# Patient Record
Sex: MALE | Race: WHITE | NOT HISPANIC OR LATINO | Employment: OTHER | URBAN - METROPOLITAN AREA
[De-identification: names, ages, dates, MRNs, and addresses within clinical notes are randomized per-mention and may not be internally consistent; named-entity substitution may affect disease eponyms.]

---

## 2018-03-28 ENCOUNTER — HOSPITAL ENCOUNTER (OUTPATIENT)
Dept: LAB | Facility: HOSPITAL | Age: 68
Setting detail: SPECIMEN
Discharge: HOME | End: 2018-03-28

## 2018-03-28 LAB
ALBUMIN SERPL-MCNC: 3.9 G/DL (ref 3.5–4.8)
ALP SERPL-CCNC: 51 IU/L (ref 32–91)
ALT SERPL-CCNC: 18 IU/L (ref 17–63)
AST SERPL-CCNC: 18 IU/L (ref 15–41)
BILIRUB DIRECT SERPL-MCNC: 0.2 MG/DL (ref 0.1–0.5)
BILIRUB SERPL-MCNC: 1.2 MG/DL (ref 0.3–1.2)
CONV TOTAL PROTEIN: 6.6 G/DL (ref 6.1–7.9)

## 2018-03-29 LAB
HAV IGM SERPL QL IA: ABNORMAL

## 2018-04-09 ENCOUNTER — HOSPITAL ENCOUNTER (OUTPATIENT)
Dept: LAB | Facility: HOSPITAL | Age: 68
Setting detail: SPECIMEN
Discharge: HOME | End: 2018-04-09

## 2018-04-09 LAB
ALBUMIN SERPL-MCNC: 4.1 G/DL (ref 3.5–4.8)
ALP SERPL-CCNC: 54 IU/L (ref 32–91)
ALT SERPL-CCNC: 20 IU/L (ref 17–63)
AST SERPL-CCNC: 21 IU/L (ref 15–41)
BILIRUB DIRECT SERPL-MCNC: 0.2 MG/DL (ref 0.1–0.5)
BILIRUB SERPL-MCNC: 1.2 MG/DL (ref 0.3–1.2)
CONV TOTAL PROTEIN: 7 G/DL (ref 6.1–7.9)

## 2018-04-10 LAB
HAV IGM SERPL QL IA: ABNORMAL

## 2018-04-26 ENCOUNTER — CONVERSION ENCOUNTER (OUTPATIENT)
Dept: FAMILY MEDICINE CLINIC | Facility: CLINIC | Age: 68
End: 2018-04-26

## 2018-04-27 LAB — HAV IGM SERPL QL IA: NORMAL

## 2018-05-31 ENCOUNTER — HOSPITAL ENCOUNTER (OUTPATIENT)
Dept: SLEEP MEDICINE | Facility: HOSPITAL | Age: 68
Discharge: HOME | End: 2018-05-31
Attending: INTERNAL MEDICINE | Admitting: INTERNAL MEDICINE

## 2018-06-11 ENCOUNTER — HOSPITAL ENCOUNTER (OUTPATIENT)
Dept: SLEEP MEDICINE | Facility: HOSPITAL | Age: 68
Discharge: HOME | End: 2018-06-11
Attending: INTERNAL MEDICINE | Admitting: INTERNAL MEDICINE

## 2018-07-05 ENCOUNTER — HOSPITAL ENCOUNTER (OUTPATIENT)
Dept: SLEEP MEDICINE | Facility: HOSPITAL | Age: 68
Discharge: HOME | End: 2018-07-05
Attending: INTERNAL MEDICINE | Admitting: INTERNAL MEDICINE

## 2018-12-17 ENCOUNTER — OFFICE (OUTPATIENT)
Dept: URBAN - METROPOLITAN AREA CLINIC 64 | Facility: CLINIC | Age: 68
End: 2018-12-17

## 2018-12-17 VITALS
HEART RATE: 68 BPM | HEIGHT: 64 IN | DIASTOLIC BLOOD PRESSURE: 82 MMHG | SYSTOLIC BLOOD PRESSURE: 139 MMHG | WEIGHT: 162 LBS

## 2018-12-17 DIAGNOSIS — R13.10 DYSPHAGIA, UNSPECIFIED: ICD-10-CM

## 2018-12-17 DIAGNOSIS — Z12.11 ENCOUNTER FOR SCREENING FOR MALIGNANT NEOPLASM OF COLON: ICD-10-CM

## 2018-12-17 DIAGNOSIS — K21.9 GASTRO-ESOPHAGEAL REFLUX DISEASE WITHOUT ESOPHAGITIS: ICD-10-CM

## 2018-12-17 PROCEDURE — 99203 OFFICE O/P NEW LOW 30 MIN: CPT | Performed by: INTERNAL MEDICINE

## 2018-12-18 ENCOUNTER — HOSPITAL ENCOUNTER (OUTPATIENT)
Dept: CARDIOLOGY | Facility: HOSPITAL | Age: 68
Discharge: HOME | End: 2018-12-18

## 2019-02-12 ENCOUNTER — HOSPITAL ENCOUNTER (OUTPATIENT)
Dept: GASTROENTEROLOGY | Facility: HOSPITAL | Age: 69
Setting detail: HOSPITAL OUTPATIENT SURGERY
Discharge: HOME | End: 2019-02-12
Attending: INTERNAL MEDICINE | Admitting: INTERNAL MEDICINE

## 2019-02-12 ENCOUNTER — ON CAMPUS - OUTPATIENT (OUTPATIENT)
Dept: URBAN - METROPOLITAN AREA HOSPITAL 85 | Facility: HOSPITAL | Age: 69
End: 2019-02-12
Payer: COMMERCIAL

## 2019-02-12 DIAGNOSIS — R13.10 DYSPHAGIA, UNSPECIFIED: ICD-10-CM

## 2019-02-12 DIAGNOSIS — K44.9 DIAPHRAGMATIC HERNIA WITHOUT OBSTRUCTION OR GANGRENE: ICD-10-CM

## 2019-02-12 LAB
GLUCOSE BLD-MCNC: 205 MG/DL (ref 70–105)
GLUCOSE BLD-MCNC: 210 MG/DL (ref 70–105)

## 2019-02-12 PROCEDURE — 43235 EGD DIAGNOSTIC BRUSH WASH: CPT | Performed by: INTERNAL MEDICINE

## 2019-02-12 PROCEDURE — 43450 DILATE ESOPHAGUS 1/MULT PASS: CPT | Performed by: INTERNAL MEDICINE

## 2019-02-27 ENCOUNTER — OFFICE (OUTPATIENT)
Dept: URBAN - METROPOLITAN AREA CLINIC 64 | Facility: CLINIC | Age: 69
End: 2019-02-27

## 2019-02-27 VITALS
WEIGHT: 167 LBS | HEIGHT: 64 IN | DIASTOLIC BLOOD PRESSURE: 80 MMHG | SYSTOLIC BLOOD PRESSURE: 144 MMHG | HEART RATE: 64 BPM

## 2019-02-27 DIAGNOSIS — R13.10 DYSPHAGIA, UNSPECIFIED: ICD-10-CM

## 2019-02-27 PROCEDURE — 99212 OFFICE O/P EST SF 10 MIN: CPT | Performed by: NURSE PRACTITIONER

## 2019-06-21 ENCOUNTER — OFFICE VISIT (OUTPATIENT)
Dept: FAMILY MEDICINE CLINIC | Facility: CLINIC | Age: 69
End: 2019-06-21

## 2019-06-21 ENCOUNTER — TELEPHONE (OUTPATIENT)
Dept: FAMILY MEDICINE CLINIC | Facility: CLINIC | Age: 69
End: 2019-06-21

## 2019-06-21 ENCOUNTER — HOSPITAL ENCOUNTER (OUTPATIENT)
Dept: GENERAL RADIOLOGY | Facility: HOSPITAL | Age: 69
Discharge: HOME | End: 2019-06-21

## 2019-06-21 ENCOUNTER — HOSPITAL ENCOUNTER (OUTPATIENT)
Dept: GENERAL RADIOLOGY | Facility: HOSPITAL | Age: 69
Discharge: HOME | End: 2019-06-21
Admitting: FAMILY MEDICINE

## 2019-06-21 VITALS
HEART RATE: 80 BPM | TEMPERATURE: 98.3 F | RESPIRATION RATE: 18 BRPM | WEIGHT: 165.8 LBS | OXYGEN SATURATION: 98 % | HEIGHT: 64 IN | DIASTOLIC BLOOD PRESSURE: 70 MMHG | SYSTOLIC BLOOD PRESSURE: 116 MMHG | BODY MASS INDEX: 28.31 KG/M2

## 2019-06-21 DIAGNOSIS — M54.50 LOW BACK PAIN POTENTIALLY ASSOCIATED WITH RADICULOPATHY: Primary | ICD-10-CM

## 2019-06-21 DIAGNOSIS — M25.552 LEFT HIP PAIN: ICD-10-CM

## 2019-06-21 DIAGNOSIS — M54.50 LOW BACK PAIN POTENTIALLY ASSOCIATED WITH RADICULOPATHY: ICD-10-CM

## 2019-06-21 PROBLEM — E55.9 VITAMIN D DEFICIENCY: Status: ACTIVE | Noted: 2019-06-21

## 2019-06-21 PROBLEM — R35.1 NOCTURIA: Status: ACTIVE | Noted: 2019-06-21

## 2019-06-21 PROBLEM — G47.33 OSA (OBSTRUCTIVE SLEEP APNEA): Status: ACTIVE | Noted: 2019-06-21

## 2019-06-21 PROBLEM — N52.9 ERECTILE DYSFUNCTION: Status: ACTIVE | Noted: 2019-06-21

## 2019-06-21 PROBLEM — K21.9 GERD (GASTROESOPHAGEAL REFLUX DISEASE): Status: ACTIVE | Noted: 2019-06-21

## 2019-06-21 PROBLEM — R53.83 FATIGUE: Status: ACTIVE | Noted: 2019-06-21

## 2019-06-21 PROBLEM — E78.5 DYSLIPIDEMIA: Status: ACTIVE | Noted: 2019-06-21

## 2019-06-21 PROCEDURE — 99214 OFFICE O/P EST MOD 30 MIN: CPT | Performed by: FAMILY MEDICINE

## 2019-06-21 PROCEDURE — 73502 X-RAY EXAM HIP UNI 2-3 VIEWS: CPT

## 2019-06-21 PROCEDURE — 72110 X-RAY EXAM L-2 SPINE 4/>VWS: CPT

## 2019-06-21 RX ORDER — OMEGA-3S/DHA/EPA/FISH OIL/D3 300MG-1000
400 CAPSULE ORAL DAILY
COMMUNITY

## 2019-06-21 RX ORDER — OMEPRAZOLE 20 MG/1
1 CAPSULE, DELAYED RELEASE ORAL DAILY
COMMUNITY

## 2019-06-21 RX ORDER — LORATADINE 10 MG/1
1 TABLET ORAL DAILY
COMMUNITY
End: 2019-10-02

## 2019-06-21 RX ORDER — MELOXICAM 15 MG/1
15 TABLET ORAL DAILY
Qty: 30 TABLET | Refills: 5 | Status: SHIPPED | OUTPATIENT
Start: 2019-06-21 | End: 2019-10-02

## 2019-06-21 RX ORDER — ELECTROLYTES/DEXTROSE
1 SOLUTION, ORAL ORAL DAILY
COMMUNITY
End: 2019-10-02

## 2019-06-21 RX ORDER — LISINOPRIL 20 MG/1
20 TABLET ORAL DAILY
COMMUNITY

## 2019-06-21 NOTE — TELEPHONE ENCOUNTER
----- Message from Cesar Mcleod MD sent at 6/21/2019  3:58 PM EDT -----  Please notify patient that  Xray showed significant arthritis in his back.

## 2019-06-21 NOTE — TELEPHONE ENCOUNTER
----- Message from Cesar Mcleod MD sent at 6/21/2019  4:00 PM EDT -----  Please notify patient that  Hip xray was unremarkable. Consider MRI back if not improved with mobic.

## 2019-06-21 NOTE — PROGRESS NOTES
Subjective   Richard Garcia is a 69 y.o. male.     Left hip locks up, unable to walk long distances x 6 months.  Also ongoing back pain. Recent DANIEL normal. Lower extremity numbness bilat legs. Pt is also type 1 DM      Hip Pain    There was no injury mechanism. The pain is present in the left hip. The quality of the pain is described as aching. The pain has been constant since onset. Associated symptoms include a loss of motion and numbness (in both legs). The symptoms are aggravated by movement.        The following portions of the patient's history were reviewed and updated as appropriate: allergies, current medications, past family history, past medical history, past social history, past surgical history and problem list.    Review of Systems   Constitutional: Negative for activity change, appetite change, fatigue and fever.   HENT: Negative for ear pain and voice change.    Eyes: Negative for visual disturbance.   Respiratory: Negative for shortness of breath and wheezing.    Cardiovascular: Negative for chest pain and leg swelling.   Gastrointestinal: Negative for abdominal pain, blood in stool, constipation, diarrhea, nausea and vomiting.   Endocrine: Negative for polydipsia and polyuria.   Genitourinary: Negative for dysuria, frequency and hematuria.   Musculoskeletal: Positive for back pain and myalgias. Negative for joint swelling, neck pain and neck stiffness.   Skin: Negative for rash and bruise.   Neurological: Positive for numbness (in both legs). Negative for weakness and headache.   Psychiatric/Behavioral: Negative for suicidal ideas and depressed mood.       Objective   Physical Exam   Constitutional: He is oriented to person, place, and time. He appears well-developed and well-nourished.   Eyes: Conjunctivae and EOM are normal. Pupils are equal, round, and reactive to light.   Neck: Normal range of motion. Neck supple.   Cardiovascular: Normal rate, regular rhythm and normal heart sounds.    Pulmonary/Chest: Effort normal and breath sounds normal.   Abdominal: Soft. Bowel sounds are normal.   Musculoskeletal: Normal range of motion.   nontender on exam, pain with walking   Neurological: He is alert and oriented to person, place, and time. A sensory deficit is present.   Stocking distribution numbness   Skin: Skin is warm and dry.   Psychiatric: He has a normal mood and affect. His behavior is normal. Judgment and thought content normal.         Assessment/Plan   Problems Addressed this Visit     None      Visit Diagnoses     Low back pain potentially associated with radiculopathy    -  Primary    Possibly contributing. Will get xrays. May need to consider MRI if not improved with mobic.    Relevant Medications    meloxicam (MOBIC) 15 MG tablet    Other Relevant Orders    XR Spine Lumbar 4+ View    Left hip pain        Will eval.     Relevant Medications    meloxicam (MOBIC) 15 MG tablet    Other Relevant Orders    XR Hip With or Without Pelvis 2 - 3 View Left        Medication and medication adverse effects discussed.  Drug education given and explained to patient. Patient verbalized understanding.  Findings discussed. All questions answered.  Follow-up after testing complete, sooner for worsening symptoms or any concerns

## 2019-07-18 ENCOUNTER — TELEPHONE (OUTPATIENT)
Dept: FAMILY MEDICINE CLINIC | Facility: CLINIC | Age: 69
End: 2019-07-18

## 2019-07-18 DIAGNOSIS — M25.552 LEFT HIP PAIN: ICD-10-CM

## 2019-07-18 DIAGNOSIS — M54.50 LOW BACK PAIN POTENTIALLY ASSOCIATED WITH RADICULOPATHY: Primary | ICD-10-CM

## 2019-07-25 ENCOUNTER — OFFICE VISIT (OUTPATIENT)
Dept: FAMILY MEDICINE CLINIC | Facility: CLINIC | Age: 69
End: 2019-07-25

## 2019-07-25 VITALS
SYSTOLIC BLOOD PRESSURE: 128 MMHG | HEART RATE: 78 BPM | RESPIRATION RATE: 18 BRPM | DIASTOLIC BLOOD PRESSURE: 84 MMHG | TEMPERATURE: 98.4 F | HEIGHT: 64 IN | BODY MASS INDEX: 28.99 KG/M2 | OXYGEN SATURATION: 97 % | WEIGHT: 169.8 LBS

## 2019-07-25 DIAGNOSIS — E10.9 TYPE 1 DIABETES MELLITUS WITHOUT COMPLICATION (HCC): ICD-10-CM

## 2019-07-25 DIAGNOSIS — J01.00 ACUTE NON-RECURRENT MAXILLARY SINUSITIS: Primary | ICD-10-CM

## 2019-07-25 PROCEDURE — 99213 OFFICE O/P EST LOW 20 MIN: CPT | Performed by: FAMILY MEDICINE

## 2019-07-25 RX ORDER — AZITHROMYCIN 250 MG/1
TABLET, FILM COATED ORAL
Qty: 6 TABLET | Refills: 0 | Status: SHIPPED | OUTPATIENT
Start: 2019-07-25 | End: 2019-10-02

## 2019-07-25 NOTE — PROGRESS NOTES
Subjective   Richard Garcia is a 69 y.o. male.     Sore Throat    This is a new problem. The current episode started in the past 7 days (Sunday/Monday). The problem has been gradually worsening. There has been no fever. Associated symptoms include congestion, coughing and headaches. Pertinent negatives include no abdominal pain, diarrhea, ear pain, neck pain, shortness of breath, swollen glands or vomiting. He has had no exposure to strep or mono. The treatment provided mild relief.        The following portions of the patient's history were reviewed and updated as appropriate: allergies, current medications, past family history, past medical history, past social history, past surgical history and problem list.    Patient Active Problem List   Diagnosis   • Dyslipidemia   • Erectile dysfunction   • Fatigue   • GERD (gastroesophageal reflux disease)   • Hepatitis C   • Neuropathy   • Nocturia   • ABY (obstructive sleep apnea)   • Other specified disorders of Eustachian tube, unspecified ear   • Polyosteoarthritis   • Pulmonary nodule   • Type 1 diabetes mellitus (CMS/HCC)   • Vitamin D deficiency       Current Outpatient Medications on File Prior to Visit   Medication Sig Dispense Refill   • cholecalciferol (VITAMIN D3) 400 units tablet Take 400 Units by mouth Daily.     • Cyanocobalamin (VITAMIN B-12 PO) Take 1 tablet by mouth Daily.     • insulin aspart (NOVOLOG) 100 UNIT/ML injection Inject  under the skin into the appropriate area as directed Daily.     • lisinopril (PRINIVIL,ZESTRIL) 20 MG tablet Take 20 mg by mouth Daily.     • loratadine (CLARITIN) 10 MG tablet Take 1 tablet by mouth Daily.     • meloxicam (MOBIC) 15 MG tablet Take 1 tablet by mouth Daily. 30 tablet 5   • omeprazole (priLOSEC) 20 MG capsule Take 1 capsule by mouth Daily.     • Multiple Vitamins-Minerals (MULTIVITAMIN ADULT) tablet Take 1 tablet by mouth Daily.       No current facility-administered medications on file prior to visit.         No Known Allergies    Review of Systems   Constitutional: Negative for activity change, appetite change, fatigue and fever.   HENT: Positive for congestion and sore throat. Negative for ear pain and voice change.    Eyes: Negative for visual disturbance.   Respiratory: Positive for cough. Negative for shortness of breath and wheezing.    Cardiovascular: Negative for chest pain and leg swelling.   Gastrointestinal: Negative for abdominal pain, blood in stool, constipation, diarrhea, nausea and vomiting.   Endocrine: Negative for polydipsia and polyuria.   Genitourinary: Negative for dysuria, frequency and hematuria.   Musculoskeletal: Negative for joint swelling, neck pain and neck stiffness.   Skin: Negative for rash and bruise.   Neurological: Negative for weakness, numbness and headache.   Psychiatric/Behavioral: Negative for suicidal ideas and depressed mood.       Objective   Physical Exam   Constitutional: He is oriented to person, place, and time. He appears well-developed and well-nourished.   HENT:   Nose: Sinus tenderness and congestion present.   Eyes: Conjunctivae and EOM are normal. Pupils are equal, round, and reactive to light.   Neck: Normal range of motion. Neck supple.   Cardiovascular: Normal rate, regular rhythm and normal heart sounds.   Pulmonary/Chest: Effort normal and breath sounds normal.   Abdominal: Soft. Bowel sounds are normal.   Musculoskeletal: Normal range of motion.   Neurological: He is alert and oriented to person, place, and time.   Skin: Skin is warm and dry.   Psychiatric: He has a normal mood and affect. His behavior is normal. Judgment and thought content normal.         Assessment/Plan .  Problem List Items Addressed This Visit     Type 1 diabetes mellitus (CMS/[facility])    Overview     Dr Lucas           Other Visit Diagnoses     Acute non-recurrent maxillary sinusitis    -  Primary    finish antibiotics    Relevant Medications    azithromycin (ZITHROMAX) 250 MG  tablet        Findings discussed. All questions answered.  Start OTC allergy meds such as claritin.  Follow-up in 2 weeks if not better.  Follow-up sooner for worsening symptoms or for any concerns.

## 2019-07-29 DIAGNOSIS — J01.00 ACUTE NON-RECURRENT MAXILLARY SINUSITIS: Primary | ICD-10-CM

## 2019-07-29 RX ORDER — CEFPROZIL 500 MG/1
500 TABLET, FILM COATED ORAL 2 TIMES DAILY
Qty: 20 TABLET | Refills: 0 | Status: SHIPPED | OUTPATIENT
Start: 2019-07-29 | End: 2019-08-08

## 2019-07-30 ENCOUNTER — TELEPHONE (OUTPATIENT)
Dept: FAMILY MEDICINE CLINIC | Facility: CLINIC | Age: 69
End: 2019-07-30

## 2019-07-30 ENCOUNTER — OFFICE VISIT (OUTPATIENT)
Dept: FAMILY MEDICINE CLINIC | Facility: CLINIC | Age: 69
End: 2019-07-30

## 2019-07-30 VITALS
BODY MASS INDEX: 28.38 KG/M2 | RESPIRATION RATE: 18 BRPM | TEMPERATURE: 98.1 F | WEIGHT: 166.2 LBS | HEART RATE: 69 BPM | SYSTOLIC BLOOD PRESSURE: 128 MMHG | OXYGEN SATURATION: 98 % | DIASTOLIC BLOOD PRESSURE: 82 MMHG | HEIGHT: 64 IN

## 2019-07-30 DIAGNOSIS — J98.9 REACTIVE AIRWAY DISEASE THAT IS NOT ASTHMA: ICD-10-CM

## 2019-07-30 DIAGNOSIS — R06.02 SHORTNESS OF BREATH: ICD-10-CM

## 2019-07-30 DIAGNOSIS — R05.9 COUGH: Primary | ICD-10-CM

## 2019-07-30 PROCEDURE — 99214 OFFICE O/P EST MOD 30 MIN: CPT | Performed by: FAMILY MEDICINE

## 2019-07-30 RX ORDER — BUDESONIDE AND FORMOTEROL FUMARATE DIHYDRATE 160; 4.5 UG/1; UG/1
1 AEROSOL RESPIRATORY (INHALATION)
Qty: 1 INHALER | Refills: 0 | COMMUNITY
Start: 2019-07-30 | End: 2019-08-29

## 2019-07-30 RX ORDER — DOXYCYCLINE 100 MG/1
100 CAPSULE ORAL 2 TIMES DAILY
Qty: 20 CAPSULE | Refills: 0 | Status: SHIPPED | OUTPATIENT
Start: 2019-07-30 | End: 2019-08-09

## 2019-07-30 RX ORDER — ALBUTEROL SULFATE 90 UG/1
2 AEROSOL, METERED RESPIRATORY (INHALATION) EVERY 4 HOURS PRN
Qty: 1 INHALER | Refills: 0 | Status: SHIPPED | OUTPATIENT
Start: 2019-07-30 | End: 2019-10-02

## 2019-07-30 NOTE — PROGRESS NOTES
Subjective   Richard Garcia is a 69 y.o. male.     Insurance would not cover cefzil. Sx worsening.      Sore Throat    This is a new problem. Episode onset: 2 weeks. The problem has been gradually worsening. There has been no fever. Associated symptoms include congestion, coughing (productive cough, yellow in color) and shortness of breath. Pertinent negatives include no abdominal pain, diarrhea, ear pain, headaches, neck pain, swollen glands or vomiting. He has had no exposure to strep or mono. The treatment provided mild relief.        The following portions of the patient's history were reviewed and updated as appropriate: allergies, current medications, past family history, past medical history, past social history, past surgical history and problem list.    Patient Active Problem List   Diagnosis   • Dyslipidemia   • Erectile dysfunction   • Fatigue   • GERD (gastroesophageal reflux disease)   • Hepatitis C   • Neuropathy   • Nocturia   • ABY (obstructive sleep apnea)   • Other specified disorders of Eustachian tube, unspecified ear   • Polyosteoarthritis   • Pulmonary nodule   • Type 1 diabetes mellitus (CMS/HCC)   • Vitamin D deficiency       Current Outpatient Medications on File Prior to Visit   Medication Sig Dispense Refill   • cholecalciferol (VITAMIN D3) 400 units tablet Take 400 Units by mouth Daily.     • Cyanocobalamin (VITAMIN B-12 PO) Take 1 tablet by mouth Daily.     • insulin aspart (NOVOLOG) 100 UNIT/ML injection Inject  under the skin into the appropriate area as directed Daily.     • lisinopril (PRINIVIL,ZESTRIL) 20 MG tablet Take 20 mg by mouth Daily.     • loratadine (CLARITIN) 10 MG tablet Take 1 tablet by mouth Daily.     • meloxicam (MOBIC) 15 MG tablet Take 1 tablet by mouth Daily. 30 tablet 5   • Multiple Vitamins-Minerals (MULTIVITAMIN ADULT) tablet Take 1 tablet by mouth Daily.     • omeprazole (priLOSEC) 20 MG capsule Take 1 capsule by mouth Daily.     • azithromycin (ZITHROMAX)  250 MG tablet Take 2 tablets the first day, then 1 tablet daily for 4 days. 6 tablet 0   • cefprozil (CEFZIL) 500 MG tablet Take 1 tablet by mouth 2 (Two) Times a Day for 10 days. 20 tablet 0     No current facility-administered medications on file prior to visit.        No Known Allergies    Review of Systems   Constitutional: Negative for activity change, appetite change, fatigue and fever.   HENT: Positive for congestion and sore throat. Negative for ear pain and voice change.    Eyes: Negative for visual disturbance.   Respiratory: Positive for cough (productive cough, yellow in color) and shortness of breath. Negative for wheezing.    Cardiovascular: Negative for chest pain and leg swelling.   Gastrointestinal: Negative for abdominal pain, blood in stool, constipation, diarrhea, nausea and vomiting.   Endocrine: Negative for polydipsia and polyuria.   Genitourinary: Negative for dysuria, frequency and hematuria.   Musculoskeletal: Negative for joint swelling, neck pain and neck stiffness.   Skin: Negative for rash and bruise.   Neurological: Negative for weakness, numbness and headache.   Psychiatric/Behavioral: Negative for suicidal ideas and depressed mood.       Objective   Physical Exam   Constitutional: He is oriented to person, place, and time. He appears well-developed and well-nourished.   HENT:   Nose: Sinus tenderness and congestion present.   Eyes: Conjunctivae and EOM are normal. Pupils are equal, round, and reactive to light.   Neck: Normal range of motion. Neck supple.   Cardiovascular: Normal rate, regular rhythm and normal heart sounds.   Pulmonary/Chest: Effort normal. He has decreased breath sounds in the right upper field, the right middle field, the left upper field and the left middle field. He has rhonchi in the right upper field, the right middle field, the left upper field and the left middle field.   Abdominal: Soft. Bowel sounds are normal.   Musculoskeletal: Normal range of motion.    Neurological: He is alert and oriented to person, place, and time.   Skin: Skin is warm and dry.   Psychiatric: He has a normal mood and affect. His behavior is normal. Judgment and thought content normal.         Assessment/Plan .  Problem List Items Addressed This Visit     None      Visit Diagnoses     Cough    -  Primary    Relevant Medications    budesonide-formoterol (SYMBICORT) 160-4.5 MCG/ACT inhaler    albuterol sulfate  (90 Base) MCG/ACT inhaler    doxycycline (MONODOX) 100 MG capsule    Other Relevant Orders    XR Chest 2 View    Shortness of breath        Relevant Medications    budesonide-formoterol (SYMBICORT) 160-4.5 MCG/ACT inhaler    albuterol sulfate  (90 Base) MCG/ACT inhaler    doxycycline (MONODOX) 100 MG capsule    Other Relevant Orders    XR Chest 2 View    Reactive airway disease that is not asthma        Relevant Medications    budesonide-formoterol (SYMBICORT) 160-4.5 MCG/ACT inhaler    albuterol sulfate  (90 Base) MCG/ACT inhaler    doxycycline (MONODOX) 100 MG capsule    Other Relevant Orders    XR Chest 2 View        Findings discussed. All questions answered.  Start OTC allergy meds such as claritin.  Follow-up in 2 weeks if not better.  Follow-up sooner for worsening symptoms or for any concerns.

## 2019-08-02 ENCOUNTER — TELEPHONE (OUTPATIENT)
Dept: FAMILY MEDICINE CLINIC | Facility: CLINIC | Age: 69
End: 2019-08-02

## 2019-10-02 ENCOUNTER — HOSPITAL ENCOUNTER (OUTPATIENT)
Facility: HOSPITAL | Age: 69
Setting detail: OBSERVATION
Discharge: HOME | End: 2019-10-04
Attending: INTERNAL MEDICINE | Admitting: INTERNAL MEDICINE

## 2019-10-02 DIAGNOSIS — R73.9 HYPERGLYCEMIA: Primary | ICD-10-CM

## 2019-10-02 LAB
ALBUMIN SERPL-MCNC: 4.1 G/DL (ref 3.5–4.8)
ALBUMIN/GLOB SERPL: 1.6 G/DL (ref 1–1.7)
ALP SERPL-CCNC: 59 U/L (ref 32–91)
ALT SERPL W P-5'-P-CCNC: 33 U/L (ref 17–63)
ANION GAP SERPL CALCULATED.3IONS-SCNC: 21.3 MMOL/L (ref 5–15)
AST SERPL-CCNC: 29 U/L (ref 15–41)
BASOPHILS # BLD AUTO: 0.1 10*3/MM3 (ref 0–0.2)
BASOPHILS NFR BLD AUTO: 0.7 % (ref 0–1.5)
BILIRUB SERPL-MCNC: 1.9 MG/DL (ref 0.3–1.2)
BILIRUB UR QL STRIP: NEGATIVE
BUN BLD-MCNC: 32 MG/DL (ref 8–20)
BUN/CREAT SERPL: 20 (ref 6.2–20.3)
CALCIUM SPEC-SCNC: 9.1 MG/DL (ref 8.9–10.3)
CHLORIDE SERPL-SCNC: 95 MMOL/L (ref 101–111)
CLARITY UR: CLEAR
CO2 SERPL-SCNC: 19 MMOL/L (ref 22–32)
COLOR UR: YELLOW
CREAT BLD-MCNC: 1.6 MG/DL (ref 0.7–1.2)
DEPRECATED RDW RBC AUTO: 44.2 FL (ref 37–54)
EOSINOPHIL # BLD AUTO: 0 10*3/MM3 (ref 0–0.4)
EOSINOPHIL NFR BLD AUTO: 0.3 % (ref 0.3–6.2)
ERYTHROCYTE [DISTWIDTH] IN BLOOD BY AUTOMATED COUNT: 13.5 % (ref 12.3–15.4)
GFR SERPL CREATININE-BSD FRML MDRD: 43 ML/MIN/1.73
GLOBULIN UR ELPH-MCNC: 2.6 GM/DL (ref 2.5–3.8)
GLUCOSE BLD-MCNC: 825 MG/DL (ref 65–99)
GLUCOSE BLDC GLUCOMTR-MCNC: 252 MG/DL (ref 70–105)
GLUCOSE BLDC GLUCOMTR-MCNC: 458 MG/DL (ref 70–105)
GLUCOSE BLDC GLUCOMTR-MCNC: >500 MG/DL (ref 70–105)
GLUCOSE UR STRIP-MCNC: ABNORMAL MG/DL
HCT VFR BLD AUTO: 41.5 % (ref 37.5–51)
HGB BLD-MCNC: 13.9 G/DL (ref 13–17.7)
HGB UR QL STRIP.AUTO: NEGATIVE
HOLD SPECIMEN: NORMAL
KETONES UR QL STRIP: ABNORMAL
LEUKOCYTE ESTERASE UR QL STRIP.AUTO: NEGATIVE
LYMPHOCYTES # BLD AUTO: 0.9 10*3/MM3 (ref 0.7–3.1)
LYMPHOCYTES NFR BLD AUTO: 8.7 % (ref 19.6–45.3)
MCH RBC QN AUTO: 30.9 PG (ref 26.6–33)
MCHC RBC AUTO-ENTMCNC: 33.5 G/DL (ref 31.5–35.7)
MCV RBC AUTO: 92.2 FL (ref 79–97)
MONOCYTES # BLD AUTO: 0.5 10*3/MM3 (ref 0.1–0.9)
MONOCYTES NFR BLD AUTO: 4.5 % (ref 5–12)
NEUTROPHILS # BLD AUTO: 8.8 10*3/MM3 (ref 1.7–7)
NEUTROPHILS NFR BLD AUTO: 85.8 % (ref 42.7–76)
NITRITE UR QL STRIP: NEGATIVE
NRBC BLD AUTO-RTO: 0 /100 WBC (ref 0–0.2)
PH UR STRIP.AUTO: 5.5 [PH] (ref 5–8)
PLATELET # BLD AUTO: 167 10*3/MM3 (ref 140–450)
PMV BLD AUTO: 11.1 FL (ref 6–12)
POTASSIUM BLD-SCNC: 5.3 MMOL/L (ref 3.6–5.1)
PROT SERPL-MCNC: 6.7 G/DL (ref 6.1–7.9)
PROT UR QL STRIP: NEGATIVE
RBC # BLD AUTO: 4.49 10*6/MM3 (ref 4.14–5.8)
SODIUM BLD-SCNC: 130 MMOL/L (ref 136–144)
SP GR UR STRIP: 1.03 (ref 1–1.03)
UROBILINOGEN UR QL STRIP: ABNORMAL
WBC NRBC COR # BLD: 10.2 10*3/MM3 (ref 3.4–10.8)
WHOLE BLOOD HOLD SPECIMEN: NORMAL
WHOLE BLOOD HOLD SPECIMEN: NORMAL

## 2019-10-02 PROCEDURE — 82962 GLUCOSE BLOOD TEST: CPT

## 2019-10-02 PROCEDURE — G0378 HOSPITAL OBSERVATION PER HR: HCPCS

## 2019-10-02 PROCEDURE — 96365 THER/PROPH/DIAG IV INF INIT: CPT

## 2019-10-02 PROCEDURE — 85025 COMPLETE CBC W/AUTO DIFF WBC: CPT | Performed by: NURSE PRACTITIONER

## 2019-10-02 PROCEDURE — 82803 BLOOD GASES ANY COMBINATION: CPT

## 2019-10-02 PROCEDURE — 80053 COMPREHEN METABOLIC PANEL: CPT | Performed by: NURSE PRACTITIONER

## 2019-10-02 PROCEDURE — 81003 URINALYSIS AUTO W/O SCOPE: CPT | Performed by: NURSE PRACTITIONER

## 2019-10-02 PROCEDURE — 99220 PR INITIAL OBSERVATION CARE/DAY 70 MINUTES: CPT | Performed by: INTERNAL MEDICINE

## 2019-10-02 PROCEDURE — 99284 EMERGENCY DEPT VISIT MOD MDM: CPT

## 2019-10-02 PROCEDURE — 96366 THER/PROPH/DIAG IV INF ADDON: CPT

## 2019-10-02 PROCEDURE — 63710000001 INSULIN REGULAR HUMAN PER 5 UNITS

## 2019-10-02 RX ORDER — LISINOPRIL 20 MG/1
20 TABLET ORAL DAILY
Status: DISCONTINUED | OUTPATIENT
Start: 2019-10-03 | End: 2019-10-04 | Stop reason: HOSPADM

## 2019-10-02 RX ORDER — PANTOPRAZOLE SODIUM 40 MG/1
40 TABLET, DELAYED RELEASE ORAL
Status: DISCONTINUED | OUTPATIENT
Start: 2019-10-03 | End: 2019-10-04 | Stop reason: HOSPADM

## 2019-10-02 RX ORDER — SODIUM CHLORIDE 0.9 % (FLUSH) 0.9 %
10 SYRINGE (ML) INJECTION AS NEEDED
Status: DISCONTINUED | OUTPATIENT
Start: 2019-10-02 | End: 2019-10-04 | Stop reason: HOSPADM

## 2019-10-02 RX ADMIN — SODIUM CHLORIDE 0.1 UNITS/KG/HR: 900 INJECTION INTRAVENOUS at 21:35

## 2019-10-02 RX ADMIN — INSULIN HUMAN 10 UNITS: 100 INJECTION, SOLUTION PARENTERAL at 19:01

## 2019-10-02 RX ADMIN — SODIUM CHLORIDE 1000 ML: 900 INJECTION, SOLUTION INTRAVENOUS at 19:01

## 2019-10-02 RX ADMIN — SODIUM CHLORIDE 1000 ML: 900 INJECTION, SOLUTION INTRAVENOUS at 20:30

## 2019-10-02 NOTE — ED NOTES
Attempted to obtain blood sugar and it read HI.  Over 600  MD and nurse  will be notified in main ED,     Ashwini Francois RN  10/02/19 9789

## 2019-10-02 NOTE — ED NOTES
Pt had low BS yesterday afternoon, but today when pt was outside drinking lemonade his BS was over 600. Pt is on insulin pump, but it is not bringing it down     Allan Mehta, LPN  10/02/19 2924

## 2019-10-02 NOTE — ED PROVIDER NOTES
Subjective   Patient is an insulin-dependent diabetic on an insulin pump and his blood sugar has been greater than 500 all day today.  He changed pump location and put a new vial and but there has been no effect on his blood glucose.  He has no complaints he does not feel ill he is not short of breath there has been no recent fever or illness.  States he did have an episode of hypoglycemia yesterday that resolved with oral glucose at home            Review of Systems   Constitutional: Negative for fever.   Respiratory: Negative for shortness of breath.    Cardiovascular: Negative for chest pain.   Gastrointestinal: Negative for abdominal pain, diarrhea and vomiting.   Genitourinary: Positive for frequency.       Past Medical History:   Diagnosis Date   • Diabetes mellitus type I, controlled (CMS/[facility])    • Dyslipidemia    • Erectile dysfunction     Impression: viagra causes headache. Try cilais   • Fatigue     Impression: likely multifactorial,Findings discussed. All questions answered. Differential diagnosis discussed. Follow-up after testing complete, sooner for worsening symptoms or any concerns. Recommend sleep study. Wife says pt snores significantly.   • GERD (gastroesophageal reflux disease)    • History of chicken pox    • History of hepatitis C     Impression: s/p tx with harvoni   • History of tobacco use    • Lipoma of flank     Impression: Findings discussed. All questions answered. Reassurance, education. Natural course and self-limited nature of this condition discussed.   • Medicare annual wellness visit, subsequent     with abnormal findings   • Neck pain     Impression: left sided.   • Nocturia    • ABY (obstructive sleep apnea)     Impression: per sleep study. Pt recently bought new mattress, wants to see how he does with that.   • Overweight (BMI 25.0-29.9)    • Screening for alcoholism    • Screening for depression    • Vitamin D deficiency        No Known Allergies    History reviewed. No  pertinent surgical history.    Family History   Problem Relation Age of Onset   • Diabetes Mother         Diabetes Mellitus    • Coronary artery disease Mother    • Hypertension Mother    • Coronary artery disease Father    • Hypertension Father    • Pancreatic cancer Sister    • Esophageal cancer Brother        Social History     Socioeconomic History   • Marital status:      Spouse name: Not on file   • Number of children: Not on file   • Years of education: Not on file   • Highest education level: Not on file   Tobacco Use   • Smoking status: Former Smoker     Last attempt to quit: 2011     Years since quittin.6   Substance and Sexual Activity   • Alcohol use: No     Frequency: Never   • Drug use: No           Objective   Physical Exam  69-year-old gentleman sitting up in the bed awake alert in no acute distress on examination eyes are clear nonicteric pharynx clear with patent mouth moist neck is supple breath sounds clear and equal bilaterally heart sounds S1-S2 no murmur abdomen soft nontender normal bowel sounds back has no CVA tenderness  Procedures           ED Course      Results for orders placed or performed during the hospital encounter of 10/02/19   Comprehensive Metabolic Panel   Result Value Ref Range    Glucose 825 (C) 65 - 99 mg/dL    BUN 32 (H) 8 - 20 mg/dL    Creatinine 1.60 (H) 0.70 - 1.20 mg/dL    Sodium 130 (L) 136 - 144 mmol/L    Potassium 5.3 (H) 3.6 - 5.1 mmol/L    Chloride 95 (L) 101 - 111 mmol/L    CO2 19.0 (L) 22.0 - 32.0 mmol/L    Calcium 9.1 8.9 - 10.3 mg/dL    Total Protein 6.7 6.1 - 7.9 g/dL    Albumin 4.10 3.50 - 4.80 g/dL    ALT (SGPT) 33 17 - 63 U/L    AST (SGOT) 29 15 - 41 U/L    Alkaline Phosphatase 59 32 - 91 U/L    Total Bilirubin 1.9 (H) 0.3 - 1.2 mg/dL    eGFR Non African Amer 43 (L) >60 mL/min/1.73    Globulin 2.6 2.5 - 3.8 gm/dL    A/G Ratio 1.6 1.0 - 1.7 g/dL    BUN/Creatinine Ratio 20.0 6.2 - 20.3    Anion Gap 21.3 (H) 5.0 - 15.0 mmol/L   CBC Auto  Differential   Result Value Ref Range    WBC 10.20 3.40 - 10.80 10*3/mm3    RBC 4.49 4.14 - 5.80 10*6/mm3    Hemoglobin 13.9 13.0 - 17.7 g/dL    Hematocrit 41.5 37.5 - 51.0 %    MCV 92.2 79.0 - 97.0 fL    MCH 30.9 26.6 - 33.0 pg    MCHC 33.5 31.5 - 35.7 g/dL    RDW 13.5 12.3 - 15.4 %    RDW-SD 44.2 37.0 - 54.0 fl    MPV 11.1 6.0 - 12.0 fL    Platelets 167 140 - 450 10*3/mm3    Neutrophil % 85.8 (H) 42.7 - 76.0 %    Lymphocyte % 8.7 (L) 19.6 - 45.3 %    Monocyte % 4.5 (L) 5.0 - 12.0 %    Eosinophil % 0.3 0.3 - 6.2 %    Basophil % 0.7 0.0 - 1.5 %    Neutrophils, Absolute 8.80 (H) 1.70 - 7.00 10*3/mm3    Lymphocytes, Absolute 0.90 0.70 - 3.10 10*3/mm3    Monocytes, Absolute 0.50 0.10 - 0.90 10*3/mm3    Eosinophils, Absolute 0.00 0.00 - 0.40 10*3/mm3    Basophils, Absolute 0.10 0.00 - 0.20 10*3/mm3    nRBC 0.0 0.0 - 0.2 /100 WBC   POC Glucose Once   Result Value Ref Range    Glucose >500 (C) 70 - 105 mg/dL   Light Blue Top   Result Value Ref Range    Extra Tube hold for add-on    Green Top (Gel)   Result Value Ref Range    Extra Tube Hold for add-ons.    Lavender Top   Result Value Ref Range    Extra Tube hold for add-on    Gold Top - SST   Result Value Ref Range    Extra Tube Hold for add-ons.    Gold Top - SST   Result Value Ref Range    Extra Tube Hold for add-ons.        On reexamination patient continues to be in no distress states that he is feeling well.  Patient's initial glucose is 835 but he has a normal pH 7.35.  Patient started on insulin drip given 2 L of fluid will be admitted to the hospitalist service to slowly bring his blood sugar down.            MDM    Final diagnoses:   Hyperglycemia              Cesar Villanueva, NP  10/02/19 2031

## 2019-10-03 LAB
ANION GAP SERPL CALCULATED.3IONS-SCNC: 12 MMOL/L (ref 5–15)
BASOPHILS # BLD AUTO: 0.1 10*3/MM3 (ref 0–0.2)
BASOPHILS NFR BLD AUTO: 0.9 % (ref 0–1.5)
BUN BLD-MCNC: 24 MG/DL (ref 8–20)
BUN/CREAT SERPL: 20 (ref 6.2–20.3)
CALCIUM SPEC-SCNC: 8.9 MG/DL (ref 8.9–10.3)
CHLORIDE SERPL-SCNC: 106 MMOL/L (ref 101–111)
CO2 SERPL-SCNC: 28 MMOL/L (ref 22–32)
CREAT BLD-MCNC: 1.2 MG/DL (ref 0.7–1.2)
DEPRECATED RDW RBC AUTO: 42 FL (ref 37–54)
EOSINOPHIL # BLD AUTO: 0.2 10*3/MM3 (ref 0–0.4)
EOSINOPHIL NFR BLD AUTO: 1.6 % (ref 0.3–6.2)
ERYTHROCYTE [DISTWIDTH] IN BLOOD BY AUTOMATED COUNT: 13.4 % (ref 12.3–15.4)
GFR SERPL CREATININE-BSD FRML MDRD: 60 ML/MIN/1.73
GLUCOSE BLD-MCNC: 96 MG/DL (ref 65–99)
GLUCOSE BLDC GLUCOMTR-MCNC: 108 MG/DL (ref 70–105)
GLUCOSE BLDC GLUCOMTR-MCNC: 112 MG/DL (ref 70–105)
GLUCOSE BLDC GLUCOMTR-MCNC: 114 MG/DL (ref 70–105)
GLUCOSE BLDC GLUCOMTR-MCNC: 126 MG/DL (ref 70–105)
GLUCOSE BLDC GLUCOMTR-MCNC: 141 MG/DL (ref 70–105)
GLUCOSE BLDC GLUCOMTR-MCNC: 143 MG/DL (ref 70–105)
GLUCOSE BLDC GLUCOMTR-MCNC: 157 MG/DL (ref 70–105)
GLUCOSE BLDC GLUCOMTR-MCNC: 174 MG/DL (ref 70–105)
GLUCOSE BLDC GLUCOMTR-MCNC: 192 MG/DL (ref 70–105)
GLUCOSE BLDC GLUCOMTR-MCNC: 225 MG/DL (ref 70–105)
GLUCOSE BLDC GLUCOMTR-MCNC: 280 MG/DL (ref 70–105)
GLUCOSE BLDC GLUCOMTR-MCNC: 56 MG/DL (ref 70–105)
GLUCOSE BLDC GLUCOMTR-MCNC: 72 MG/DL (ref 70–105)
GLUCOSE BLDC GLUCOMTR-MCNC: 88 MG/DL (ref 70–105)
GLUCOSE BLDC GLUCOMTR-MCNC: 96 MG/DL (ref 70–105)
HCT VFR BLD AUTO: 39.1 % (ref 37.5–51)
HGB BLD-MCNC: 13.1 G/DL (ref 13–17.7)
LYMPHOCYTES # BLD AUTO: 2.9 10*3/MM3 (ref 0.7–3.1)
LYMPHOCYTES NFR BLD AUTO: 30.5 % (ref 19.6–45.3)
MCH RBC QN AUTO: 30.4 PG (ref 26.6–33)
MCHC RBC AUTO-ENTMCNC: 33.6 G/DL (ref 31.5–35.7)
MCV RBC AUTO: 90.3 FL (ref 79–97)
MONOCYTES # BLD AUTO: 1.2 10*3/MM3 (ref 0.1–0.9)
MONOCYTES NFR BLD AUTO: 12.2 % (ref 5–12)
NEUTROPHILS # BLD AUTO: 5.2 10*3/MM3 (ref 1.7–7)
NEUTROPHILS NFR BLD AUTO: 54.8 % (ref 42.7–76)
NRBC BLD AUTO-RTO: 0.1 /100 WBC (ref 0–0.2)
PLATELET # BLD AUTO: 179 10*3/MM3 (ref 140–450)
PMV BLD AUTO: 9.7 FL (ref 6–12)
POTASSIUM BLD-SCNC: 4 MMOL/L (ref 3.6–5.1)
RBC # BLD AUTO: 4.33 10*6/MM3 (ref 4.14–5.8)
SODIUM BLD-SCNC: 142 MMOL/L (ref 136–144)
WBC NRBC COR # BLD: 9.6 10*3/MM3 (ref 3.4–10.8)

## 2019-10-03 PROCEDURE — 99203 OFFICE O/P NEW LOW 30 MIN: CPT | Performed by: INTERNAL MEDICINE

## 2019-10-03 PROCEDURE — 96366 THER/PROPH/DIAG IV INF ADDON: CPT

## 2019-10-03 PROCEDURE — 63710000001 INSULIN GLARGINE PER 5 UNITS: Performed by: INTERNAL MEDICINE

## 2019-10-03 PROCEDURE — 63710000001 INSULIN LISPRO (HUMAN) PER 5 UNITS: Performed by: INTERNAL MEDICINE

## 2019-10-03 PROCEDURE — G0378 HOSPITAL OBSERVATION PER HR: HCPCS

## 2019-10-03 PROCEDURE — 82962 GLUCOSE BLOOD TEST: CPT

## 2019-10-03 PROCEDURE — 80048 BASIC METABOLIC PNL TOTAL CA: CPT | Performed by: NURSE PRACTITIONER

## 2019-10-03 PROCEDURE — 99225 PR SBSQ OBSERVATION CARE/DAY 25 MINUTES: CPT | Performed by: INTERNAL MEDICINE

## 2019-10-03 PROCEDURE — 85025 COMPLETE CBC W/AUTO DIFF WBC: CPT | Performed by: NURSE PRACTITIONER

## 2019-10-03 PROCEDURE — 96361 HYDRATE IV INFUSION ADD-ON: CPT

## 2019-10-03 RX ORDER — ACETAMINOPHEN 325 MG/1
650 TABLET ORAL EVERY 6 HOURS PRN
Status: DISCONTINUED | OUTPATIENT
Start: 2019-10-03 | End: 2019-10-04 | Stop reason: HOSPADM

## 2019-10-03 RX ORDER — NITROGLYCERIN 0.4 MG/1
0.4 TABLET SUBLINGUAL
Status: DISCONTINUED | OUTPATIENT
Start: 2019-10-03 | End: 2019-10-04 | Stop reason: HOSPADM

## 2019-10-03 RX ORDER — DEXTROSE MONOHYDRATE 25 G/50ML
25-50 INJECTION, SOLUTION INTRAVENOUS
Status: DISCONTINUED | OUTPATIENT
Start: 2019-10-03 | End: 2019-10-04 | Stop reason: HOSPADM

## 2019-10-03 RX ORDER — SODIUM CHLORIDE 0.9 % (FLUSH) 0.9 %
10 SYRINGE (ML) INJECTION AS NEEDED
Status: DISCONTINUED | OUTPATIENT
Start: 2019-10-03 | End: 2019-10-04 | Stop reason: HOSPADM

## 2019-10-03 RX ORDER — SODIUM CHLORIDE 9 MG/ML
125 INJECTION, SOLUTION INTRAVENOUS CONTINUOUS
Status: DISCONTINUED | OUTPATIENT
Start: 2019-10-03 | End: 2019-10-04 | Stop reason: HOSPADM

## 2019-10-03 RX ORDER — INSULIN GLARGINE 100 [IU]/ML
20 INJECTION, SOLUTION SUBCUTANEOUS EVERY MORNING
Status: DISCONTINUED | OUTPATIENT
Start: 2019-10-03 | End: 2019-10-04 | Stop reason: HOSPADM

## 2019-10-03 RX ORDER — NICOTINE POLACRILEX 4 MG
15 LOZENGE BUCCAL
Status: DISCONTINUED | OUTPATIENT
Start: 2019-10-03 | End: 2019-10-04 | Stop reason: HOSPADM

## 2019-10-03 RX ORDER — ONDANSETRON 2 MG/ML
4 INJECTION INTRAMUSCULAR; INTRAVENOUS EVERY 6 HOURS PRN
Status: DISCONTINUED | OUTPATIENT
Start: 2019-10-03 | End: 2019-10-04 | Stop reason: HOSPADM

## 2019-10-03 RX ORDER — SODIUM CHLORIDE 0.9 % (FLUSH) 0.9 %
10 SYRINGE (ML) INJECTION EVERY 12 HOURS SCHEDULED
Status: DISCONTINUED | OUTPATIENT
Start: 2019-10-03 | End: 2019-10-04 | Stop reason: HOSPADM

## 2019-10-03 RX ORDER — DEXTROSE MONOHYDRATE 25 G/50ML
25 INJECTION, SOLUTION INTRAVENOUS
Status: DISCONTINUED | OUTPATIENT
Start: 2019-10-03 | End: 2019-10-04 | Stop reason: HOSPADM

## 2019-10-03 RX ORDER — ONDANSETRON 4 MG/1
4 TABLET, FILM COATED ORAL EVERY 6 HOURS PRN
Status: DISCONTINUED | OUTPATIENT
Start: 2019-10-03 | End: 2019-10-04 | Stop reason: HOSPADM

## 2019-10-03 RX ADMIN — INSULIN LISPRO 5 UNITS: 100 INJECTION, SOLUTION INTRAVENOUS; SUBCUTANEOUS at 19:41

## 2019-10-03 RX ADMIN — Medication 10 ML: at 02:20

## 2019-10-03 RX ADMIN — LISINOPRIL 20 MG: 20 TABLET ORAL at 10:19

## 2019-10-03 RX ADMIN — Medication 10 ML: at 21:15

## 2019-10-03 RX ADMIN — SODIUM CHLORIDE 125 ML/HR: 900 INJECTION, SOLUTION INTRAVENOUS at 02:12

## 2019-10-03 RX ADMIN — INSULIN GLARGINE 20 UNITS: 100 INJECTION, SOLUTION SUBCUTANEOUS at 13:19

## 2019-10-03 RX ADMIN — ACETAMINOPHEN 650 MG: 325 TABLET ORAL at 22:24

## 2019-10-03 RX ADMIN — INSULIN LISPRO 4 UNITS: 100 INJECTION, SOLUTION INTRAVENOUS; SUBCUTANEOUS at 21:15

## 2019-10-03 RX ADMIN — INSULIN LISPRO 6 UNITS: 100 INJECTION, SOLUTION INTRAVENOUS; SUBCUTANEOUS at 13:20

## 2019-10-03 NOTE — ED NOTES
Still awaiting insulin from pharmacy, spoke to Renetta. She will send it up.     Cathy Guallpa, RN  10/02/19 2620

## 2019-10-03 NOTE — PROGRESS NOTES
"[facility] Hospitalist Team      Patient Care Team:  Cesar Mcleod MD as PCP - General  Cesar Mcleod MD as PCP - Claims Attributed  Cesar Mcleod MD as PCP - Family Medicine        Chief Complaint:  hyperglycemia    Subjective:     69 year old male with DM1 using insulin pump for many years ( this is his 4th one about 4 years old) presents with elevated serum glucose > 800. He reports mild nausea but no other complaints. He reports the other day he was hypoglycemic treated at home with glucose tab. Patient reports he knows hoe to use pump and usually has no problems. He believes there is a malfunction in pump. He reports he changed its location and the cartridges but still  Having labile serum glucose. He sees an endocrinologist [facility] He was strted on an insulin drip in ED , normal ph no ketones in urine, IVF started, pump removed. He will be admitted for close monitoring and stabilization of serum glucose with endocrinology and diabetes educator consulted. He denies CAD, has HTN well controlled and GERD. CR today was 1.6- he denies known CKD    Objective: alert/nad        Vital Signs  Temp:  [97.8 °F (36.6 °C)-98.6 °F (37 °C)] 98.6 °F (37 °C)  Heart Rate:  [66-88] 66  Resp:  [14-18] 15  BP: (126-176)/(53-90) 128/53  Oxygen Therapy  SpO2: 98 %  Pulse Oximetry Type: Continuous  Device (Oxygen Therapy): room air  Flowsheet Rows      First Filed Value   Admission Height  162.6 cm (64\") Documented at 10/02/2019 1830   Admission Weight  73.1 kg (161 lb 2.5 oz) Documented at 10/02/2019 1830        Intake & Output (last 3 days)     None        Lines, Drains & Airways    Active LDAs     Name:   Placement date:   Placement time:   Site:   Days:    Peripheral IV 10/02/19 1852 Right;Lateral Antecubital   10/02/19    1852    Antecubital   less than 1                Physical Exam:    General Appearance:    Alert, cooperative, in no acute distress   Head:    " Normocephalic, without obvious abnormality, atraumatic   Eyes:            Lids and lashes normal, conjunctivae and sclerae normal, no   icterus, no pallor, corneas clear, PERRLA   Back:     No kyphosis present, no scoliosis present, no skin lesions,      erythema or scars, no tenderness to percussion or                   palpation,   range of motion normal   Lungs:     Clear to auscultation,respirations regular, even and                  unlabored    Heart:    Regular rhythm and normal rate, normal S1 and S2, no            murmur, no gallop, no rub, no click   Chest Wall:    No abnormalities observed   Abdomen:     Normal bowel sounds, no masses, no organomegaly, soft        non-tender, non-distended, no guarding, no rebound                tenderness   Extremities:   Moves all extremities well, no edema, no cyanosis, no             redness       Results Review:       Lab Results (last 24 hours)     Procedure Component Value Units Date/Time    POC Glucose Once [[id number]]  (Abnormal) Collected:  10/03/19 0712    Specimen:  Blood Updated:  10/03/19 0713     Glucose 157 mg/dL      Comment: Serial Number: [id number]:  [id number]       POC Glucose Once [[id number]]  (Abnormal) Collected:  10/03/19 0600    Specimen:  Blood Updated:  10/03/19 0601     Glucose 192 mg/dL      Comment: Serial Number: [id number]:  [id number]       POC Glucose Once [[id number]]  (Abnormal) Collected:  10/03/19 0508    Specimen:  Blood Updated:  10/03/19 0509     Glucose 141 mg/dL      Comment: Serial Number: [id number]:  [id number]       POC Glucose Once [[id number]]  (Abnormal) Collected:  10/03/19 0405    Specimen:  Blood Updated:  10/03/19 0406     Glucose 126 mg/dL      Comment: Serial Number: [id number]:  [id number]       Basic Metabolic Panel [[id number]]  (Abnormal) Collected:  10/03/19 0300    Specimen:  Blood Updated:  10/03/19 0353     Glucose 96 mg/dL      BUN 24 mg/dL      Creatinine 1.20 mg/dL      Sodium 142  mmol/L      Potassium 4.0 mmol/L      Chloride 106 mmol/L      CO2 28.0 mmol/L      Calcium 8.9 mg/dL      eGFR Non African Amer 60 mL/min/1.73      BUN/Creatinine Ratio 20.0     Anion Gap 12.0 mmol/L     CBC Auto Differential [[id number]]  (Abnormal) Collected:  10/03/19 0300    Specimen:  Blood Updated:  10/03/19 0321     WBC 9.60 10*3/mm3      RBC 4.33 10*6/mm3      Hemoglobin 13.1 g/dL      Hematocrit 39.1 %      MCV 90.3 fL      MCH 30.4 pg      MCHC 33.6 g/dL      RDW 13.4 %      RDW-SD 42.0 fl      MPV 9.7 fL      Platelets 179 10*3/mm3      Neutrophil % 54.8 %      Lymphocyte % 30.5 %      Monocyte % 12.2 %      Eosinophil % 1.6 %      Basophil % 0.9 %      Neutrophils, Absolute 5.20 10*3/mm3      Lymphocytes, Absolute 2.90 10*3/mm3      Monocytes, Absolute 1.20 10*3/mm3      Eosinophils, Absolute 0.20 10*3/mm3      Basophils, Absolute 0.10 10*3/mm3      nRBC 0.1 /100 WBC     POC Glucose Once [[id number]]  (Normal) Collected:  10/03/19 0302    Specimen:  Blood Updated:  10/03/19 0304     Glucose 96 mg/dL      Comment: Serial Number: [id number]:  [id number]       POC Glucose Once [[id number]]  (Normal) Collected:  10/03/19 0208    Specimen:  Blood Updated:  10/03/19 0210     Glucose 72 mg/dL      Comment: Serial Number: [id number]:  [id number]       POC Glucose Once [[id number]]  (Abnormal) Collected:  10/03/19 0100    Specimen:  Blood Updated:  10/03/19 0101     Glucose 143 mg/dL      Comment: Serial Number: [id number]:  [id number]       POC Glucose Once [[id number]]  (Abnormal) Collected:  10/03/19 0002    Specimen:  Blood Updated:  10/03/19 0004     Glucose 225 mg/dL      Comment: Serial Number: [id number]:  [id number]       POC Glucose Once [[id number]]  (Abnormal) Collected:  10/02/19 2339    Specimen:  Blood Updated:  10/02/19 2340     Glucose 252 mg/dL      Comment: Serial Number: [id number]:  [id number]       Urinalysis With Culture If Indicated - Urine, Clean Catch [[id number]]   (Abnormal) Collected:  10/02/19 2140    Specimen:  Urine, Clean Catch Updated:  10/02/19 2204     Color, UA Yellow     Appearance, UA Clear     pH, UA 5.5     Specific Gravity, UA 1.027     Glucose, UA >=1000 mg/dL (3+)     Ketones, UA Trace     Bilirubin, UA Negative     Blood, UA Negative     Protein, UA Negative     Leuk Esterase, UA Negative     Nitrite, UA Negative     Urobilinogen, UA 0.2 E.U./dL    Narrative:       Urine microscopic not indicated.    POC Glucose Once [[id number]]  (Abnormal) Collected:  10/02/19 2129    Specimen:  Blood Updated:  10/02/19 2130     Glucose 458 mg/dL      Comment: Serial Number: [id number]:  [id number]       POC Glucose Once [[id number]] Collected:  10/02/19 2028    Specimen:  Blood Updated:  10/02/19 2032    Blood Gas, Venous [[id number]] Collected:  10/02/19 2028    Specimen:  Venous Blood Updated:  10/02/19 2032    Comprehensive Metabolic Panel [[id number]]  (Abnormal) Collected:  10/02/19 1852    Specimen:  Blood Updated:  10/02/19 2000     Glucose 825 mg/dL      BUN 32 mg/dL      Creatinine 1.60 mg/dL      Sodium 130 mmol/L      Potassium 5.3 mmol/L      Chloride 95 mmol/L      CO2 19.0 mmol/L      Calcium 9.1 mg/dL      Total Protein 6.7 g/dL      Albumin 4.10 g/dL      ALT (SGPT) 33 U/L      AST (SGOT) 29 U/L      Alkaline Phosphatase 59 U/L      Total Bilirubin 1.9 mg/dL      eGFR Non African Amer 43 mL/min/1.73      Globulin 2.6 gm/dL      A/G Ratio 1.6 g/dL      BUN/Creatinine Ratio 20.0     Anion Gap 21.3 mmol/L     Extra Tubes [[id number]] Collected:  10/02/19 1852    Specimen:  Blood, Venous Line Updated:  10/02/19 2000    Narrative:       The following orders were created for panel order Extra Tubes.  Procedure                               Abnormality         Status                     ---------                               -----------         ------                     Gold Top - [facility][[id number]]                                   Final result                  Please view results for these tests on the individual orders.    Gold Top - SST [[id number]] Collected:  10/02/19 1852    Specimen:  Blood Updated:  10/02/19 2000     Extra Tube Hold for add-ons.     Comment: Auto resulted.       [place] Draw [[id number]] Collected:  10/02/19 1852    Specimen:  Blood Updated:  10/02/19 2000    Narrative:       The following orders were created for panel order [place] Draw.  Procedure                               Abnormality         Status                     ---------                               -----------         ------                     Light Blue Top[[id number]]                                   Final result               Green Top (Gel)[[id number]]                                  Final result               Lavender Top[[id number]]                                     Final result               Gold Top - SST[[id number]]                                   Final result                 Please view results for these tests on the individual orders.    Light Blue Top [[id number]] Collected:  10/02/19 1852    Specimen:  Blood Updated:  10/02/19 2000     Extra Tube hold for add-on     Comment: Auto resulted       Green Top (Gel) [[id number]] Collected:  10/02/19 1852    Specimen:  Blood Updated:  10/02/19 2000     Extra Tube Hold for add-ons.     Comment: Auto resulted.       Lavender Top [[id number]] Collected:  10/02/19 1852    Specimen:  Blood Updated:  10/02/19 2000     Extra Tube hold for add-on     Comment: Auto resulted       Gold Top - SST [[id number]] Collected:  10/02/19 1852    Specimen:  Blood Updated:  10/02/19 2000     Extra Tube Hold for add-ons.     Comment: Auto resulted.       CBC & Differential [[id number]] Collected:  10/02/19 1852    Specimen:  Blood Updated:  10/02/19 1939    Narrative:       The following orders were created for panel order CBC & Differential.  Procedure                               Abnormality         Status                     ---------                                -----------         ------                     CBC Auto Differential[[id number]]        Abnormal            Final result                 Please view results for these tests on the individual orders.    CBC Auto Differential [[id number]]  (Abnormal) Collected:  10/02/19 1852    Specimen:  Blood Updated:  10/02/19 1939     WBC 10.20 10*3/mm3      RBC 4.49 10*6/mm3      Hemoglobin 13.9 g/dL      Hematocrit 41.5 %      MCV 92.2 fL      MCH 30.9 pg      MCHC 33.5 g/dL      RDW 13.5 %      RDW-SD 44.2 fl      MPV 11.1 fL      Platelets 167 10*3/mm3      Neutrophil % 85.8 %      Lymphocyte % 8.7 %      Monocyte % 4.5 %      Eosinophil % 0.3 %      Basophil % 0.7 %      Neutrophils, Absolute 8.80 10*3/mm3      Lymphocytes, Absolute 0.90 10*3/mm3      Monocytes, Absolute 0.50 10*3/mm3      Eosinophils, Absolute 0.00 10*3/mm3      Basophils, Absolute 0.10 10*3/mm3      nRBC 0.0 /100 WBC     POC Glucose Once [[id number]]  (Abnormal) Collected:  10/02/19 1833    Specimen:  Blood Updated:  10/02/19 1834     Glucose >500 mg/dL      Comment: Serial Number: [id number]:  [id number]             Imaging Results (last 24 hours)     ** No results found for the last 24 hours. **                                   I reviewed the patient's new clinical results.          ECG/EMG Results (most recent)     None            Medication Review:       Current Facility-Administered Medications:   •  dextrose (D50W) 25 g/ 50mL Intravenous Solution 25-50 mL, 25-50 mL, Intravenous, Q30 Min PRN, Isabell Watson, PORTIA  •  insulin regular (HumuLIN R,NovoLIN R) 100 Units in sodium chloride 0.9 % 100 mL (1 Units/mL) infusion, 0.1 Units/kg/hr, Intravenous, Titrated, Cesar Villanueva, NP, Stopped at 10/03/19 0200  •  INSULIN REGULAR 1 UNIT/ML INFUSION (TARGET BG -140) SIMPLE infusion  - ADS Override Pull, , , ,   •  insulin regular 1 Units/mL in sodium chloride 0.9 % 100 mL infusion, 1-20 Units/hr, Intravenous, Titrated,  Isabell Watson APRN, Last Rate: 4 mL/hr at 10/03/19 0605, 4 Units/hr at 10/03/19 0605  •  lisinopril (PRINIVIL,ZESTRIL) tablet 20 mg, 20 mg, Oral, Daily, Isabell Watson APRN  •  nitroglycerin (NITROSTAT) SL tablet 0.4 mg, 0.4 mg, Sublingual, Q5 Min PRN, Isabell Watson APRN  •  ondansetron (ZOFRAN) tablet 4 mg, 4 mg, Oral, Q6H PRN **OR** ondansetron (ZOFRAN) injection 4 mg, 4 mg, Intravenous, Q6H PRN, Isabell Watson APRN  •  pantoprazole (PROTONIX) EC tablet 40 mg, 40 mg, Oral, Q AM, Isabell Watson APRN  •  sodium chloride 0.9 % flush 10 mL, 10 mL, Intravenous, PRN, Lc Lind Jr., MD  •  sodium chloride 0.9 % flush 10 mL, 10 mL, Intravenous, Q12H, Isabell Watson APRN, 10 mL at 10/03/19 0220  •  sodium chloride 0.9 % flush 10 mL, 10 mL, Intravenous, PRN, Isabell Watson APRN  •  sodium chloride 0.9 % infusion, 125 mL/hr, Intravenous, Continuous, Isabell Watson APRN, Last Rate: 125 mL/hr at 10/03/19 0620, 125 mL/hr at 10/03/19 0620    I have reviewed the patient's current medication list    Assessment/Plan     Hyperglycemia , not acidotic HX DM Type 1 on insulin pump for many years - labile blood sugar - patient thinks device malfunction- pump removed on insulin drip with glucose check q 1 hrs until stable , NS IVF, endocrinology and diabetes educator consult         Acute renal failure Cr 1.6 likely from dehydration hyperglycemia see plan above IVF and repeat BMP      Elevated - bilirubin - likely reactive , monitor      HTN- controlled on lisinopril         GERD- on PPI      ABY- declined C Pap use at home          Plan for disposition:home    Lc Lind Jr., MD  10/03/19  7:22 AM      Time:

## 2019-10-03 NOTE — CONSULTS
Consult Note       Patient Identification:  Name: Richard Garcia  Age: 69 y.o.  Sex: male  :  1950  MRN: [id number]    I would like to thank you for the opportunity to participate in care of this patient.    Date of Service: 10/3/2019                        Reason for Consult:         Hyperkalemia and renal failure      History of Present Illness:       *69-year-old white male with significant past medical history of diabetes mellitus on insulin pump which was not working properly recently found to have blood sugar of greater than 800 and found to be have acute renal failure with creatinine of 1.6 and hyperkalemia potassium was 5.3 at the time of admission she also has some DKA slight metabolic acidosis because of some insulin deficiency.  Started on IV fluid and DKA protocol started getting better this morning creatinine improved to 1.3 and potassium is also improved with metabolic acidosis also improving.  Patient denies any new symptom feeling better than before          Review of Systems:         Constitutional: No fever, no chills, no lethargy, no weakness.  HEENT:  No headache, otalgia, itchy eyes, nasal discharge or sore throat.  Cardiac:  No chest pain, dyspnea, orthopnea or PND.  Chest:  No cough, phlegm or wheezing.  Abdomen:  No abdominal pain, nausea or vomiting.  Neuro:  No focal weakness, abnormal movements orseizure like activity.  :   No hematuria, no pyuria, no dysuria, no flank pain.  ROS was otherwise negative except as mentioned in the [place].       Past medical history, surgical history, social history, family history, allergies and all medications reviewed and agreed.      Past History/Allergies?Social History:     Past Medical History:   Diagnosis Date   • Diabetes mellitus type I, controlled (CMS/HCC)    • Dyslipidemia    • Erectile dysfunction     Impression: viagra causes headache. Try cilais   • Fatigue     Impression: likely multifactorial,Findings  discussed. All questions answered. Differential diagnosis discussed. Follow-up after testing complete, sooner for worsening symptoms or any concerns. Recommend sleep study. Wife says pt snores significantly.   • GERD (gastroesophageal reflux disease)    • History of chicken pox    • History of hepatitis C     Impression: s/p tx with harvoni   • History of tobacco use    • Lipoma of flank     Impression: Findings discussed. All questions answered. Reassurance, education. Natural course and self-limited nature of this condition discussed.   • Medicare annual wellness visit, subsequent     with abnormal findings   • Neck pain     Impression: left sided.   • Nocturia    • ABY (obstructive sleep apnea)     Impression: per sleep study. Pt recently bought new mattress, wants to see how he does with that.   • Overweight (BMI 25.0-29.9)    • Screening for alcoholism    • Screening for depression    • Vitamin D deficiency          Past Surgical History :     History reviewed. No pertinent surgical history.       Family History:     Family History   Problem Relation Age of Onset   • Diabetes Mother         Diabetes Mellitus    • Coronary artery disease Mother    • Hypertension Mother    • Coronary artery disease Father    • Hypertension Father    • Pancreatic cancer Sister    • Esophageal cancer Brother           Social History:     Social History     Tobacco Use   • Smoking status: Former Smoker     Last attempt to quit: 2011     Years since quittin.6   Substance Use Topics   • Alcohol use: No     Frequency: Never          Allergies:     No Known Allergies      Home meds:     Medications Prior to Admission   Medication Sig Dispense Refill Last Dose   • cholecalciferol (VITAMIN D3) 400 units tablet Take 400 Units by mouth Daily.   Taking   • Cyanocobalamin (VITAMIN B-12 PO) Take 1 tablet by mouth Daily.   Taking   • insulin aspart (NOVOLOG) 100 UNIT/ML injection Inject  under the skin into the appropriate area as  "directed Daily. Pump about 45-50 U per day   Taking   • lisinopril (PRINIVIL,ZESTRIL) 20 MG tablet Take 20 mg by mouth Daily.   Taking   • omeprazole (priLOSEC) 20 MG capsule Take 1 capsule by mouth Daily.   Taking         Scheduled meds:     lisinopril 20 mg Oral Daily   pantoprazole 40 mg Oral Q AM   sodium chloride 10 mL Intravenous Q12H         Objectives:     tMax 24 hrs: Temp (24hrs), Av.2 °F (36.8 °C), Min:97.8 °F (36.6 °C), Max:98.6 °F (37 °C)      Vitals Ranges:   Temp:  [97.8 °F (36.6 °C)-98.6 °F (37 °C)] 98.6 °F (37 °C)  Heart Rate:  [66-88] 68  Resp:  [14-18] 17  BP: (126-176)/(53-90) 128/57    Intake and Output Last 3 Shifts:   No intake/output data recorded.      Exam:     /57   Pulse 68   Temp 98.6 °F (37 °C) (Oral)   Resp 17   Ht 162.6 cm (64\")   Wt 72.9 kg (160 lb 11.5 oz)   SpO2 94%   BMI 27.59 kg/m²     General Appearance:    Well developed, well nourished, no distress   Head:    Normocephalic, atraumatic   Eyes:     EOM's intact, sclerae anicteric        Ears:    TMs not observed   Nose:   Patent without discharge   Neck:   Supple, JVD   Lungs:     Clear to auscultation bilaterally, respiratory effort is normal   Chest wall:    No tenderness   Heart:    Regular rate and rhythm, S1 and S2 normal, no   rub    or gallop   Extremities:   NO Edema   Abdomen:   Soft, nontender, nondistended,  no masses, no organomegaly   Neurologic:     No focal deficits.  Speech is fluent.  Conversation is coherent.               Data Review:       Lab Results (last 24 hours)     Procedure Component Value Units Date/Time    POC Glucose Once [[id number]]  (Abnormal) Collected:  10/03/19 0910    Specimen:  Blood Updated:  10/03/19 0911     Glucose 114 mg/dL      Comment: Serial Number: [id number]:  [id number]       POC Glucose Once [[id number]]  (Abnormal) Collected:  10/03/19 0823    Specimen:  Blood Updated:  10/03/19 0824     Glucose 112 mg/dL      Comment: Serial Number: [id number]:  " [id number]       POC Glucose Once [[id number]]  (Abnormal) Collected:  10/03/19 0712    Specimen:  Blood Updated:  10/03/19 0713     Glucose 157 mg/dL      Comment: Serial Number: [id number]:  [id number]       POC Glucose Once [[id number]]  (Abnormal) Collected:  10/03/19 0600    Specimen:  Blood Updated:  10/03/19 0601     Glucose 192 mg/dL      Comment: Serial Number: [id number]:  [id number]       POC Glucose Once [[id number]]  (Abnormal) Collected:  10/03/19 0508    Specimen:  Blood Updated:  10/03/19 0509     Glucose 141 mg/dL      Comment: Serial Number: [id number]:  [id number]       POC Glucose Once [[id number]]  (Abnormal) Collected:  10/03/19 0405    Specimen:  Blood Updated:  10/03/19 0406     Glucose 126 mg/dL      Comment: Serial Number: [id number]:  [id number]       Basic Metabolic Panel [[id number]]  (Abnormal) Collected:  10/03/19 0300    Specimen:  Blood Updated:  10/03/19 0353     Glucose 96 mg/dL      BUN 24 mg/dL      Creatinine 1.20 mg/dL      Sodium 142 mmol/L      Potassium 4.0 mmol/L      Chloride 106 mmol/L      CO2 28.0 mmol/L      Calcium 8.9 mg/dL      eGFR Non African Amer 60 mL/min/1.73      BUN/Creatinine Ratio 20.0     Anion Gap 12.0 mmol/L     CBC Auto Differential [[id number]]  (Abnormal) Collected:  10/03/19 0300    Specimen:  Blood Updated:  10/03/19 0321     WBC 9.60 10*3/mm3      RBC 4.33 10*6/mm3      Hemoglobin 13.1 g/dL      Hematocrit 39.1 %      MCV 90.3 fL      MCH 30.4 pg      MCHC 33.6 g/dL      RDW 13.4 %      RDW-SD 42.0 fl      MPV 9.7 fL      Platelets 179 10*3/mm3      Neutrophil % 54.8 %      Lymphocyte % 30.5 %      Monocyte % 12.2 %      Eosinophil % 1.6 %      Basophil % 0.9 %      Neutrophils, Absolute 5.20 10*3/mm3      Lymphocytes, Absolute 2.90 10*3/mm3      Monocytes, Absolute 1.20 10*3/mm3      Eosinophils, Absolute 0.20 10*3/mm3      Basophils, Absolute 0.10 10*3/mm3      nRBC 0.1 /100 WBC     POC Glucose Once [[id number]]  (Normal) Collected:   10/03/19 0302    Specimen:  Blood Updated:  10/03/19 0304     Glucose 96 mg/dL      Comment: Serial Number: [id number]:  [id number]       POC Glucose Once [[id number]]  (Normal) Collected:  10/03/19 0208    Specimen:  Blood Updated:  10/03/19 0210     Glucose 72 mg/dL      Comment: Serial Number: [id number]:  [id number]       POC Glucose Once [[id number]]  (Abnormal) Collected:  10/03/19 0100    Specimen:  Blood Updated:  10/03/19 0101     Glucose 143 mg/dL      Comment: Serial Number: [id number]:  [id number]       POC Glucose Once [[id number]]  (Abnormal) Collected:  10/03/19 0002    Specimen:  Blood Updated:  10/03/19 0004     Glucose 225 mg/dL      Comment: Serial Number: [id number]:  [id number]       POC Glucose Once [[id number]]  (Abnormal) Collected:  10/02/19 2339    Specimen:  Blood Updated:  10/02/19 2340     Glucose 252 mg/dL      Comment: Serial Number: [id number]:  [id number]       Urinalysis With Culture If Indicated - Urine, Clean Catch [[id number]]  (Abnormal) Collected:  10/02/19 2140    Specimen:  Urine, Clean Catch Updated:  10/02/19 2204     Color, UA Yellow     Appearance, UA Clear     pH, UA 5.5     Specific Gravity, UA 1.027     Glucose, UA >=1000 mg/dL (3+)     Ketones, UA Trace     Bilirubin, UA Negative     Blood, UA Negative     Protein, UA Negative     Leuk Esterase, UA Negative     Nitrite, UA Negative     Urobilinogen, UA 0.2 E.U./dL    Narrative:       Urine microscopic not indicated.    POC Glucose Once [[id number]]  (Abnormal) Collected:  10/02/19 2129    Specimen:  Blood Updated:  10/02/19 2130     Glucose 458 mg/dL      Comment: Serial Number: [id number]:  [id number]       POC Glucose Once [[id number]] Collected:  10/02/19 2028    Specimen:  Blood Updated:  10/02/19 2032    Blood Gas, Venous [[id number]] Collected:  10/02/19 2028    Specimen:  Venous Blood Updated:  10/02/19 2032    Comprehensive Metabolic Panel [[id number]]  (Abnormal) Collected:  10/02/19  1852    Specimen:  Blood Updated:  10/02/19 2000     Glucose 825 mg/dL      BUN 32 mg/dL      Creatinine 1.60 mg/dL      Sodium 130 mmol/L      Potassium 5.3 mmol/L      Chloride 95 mmol/L      CO2 19.0 mmol/L      Calcium 9.1 mg/dL      Total Protein 6.7 g/dL      Albumin 4.10 g/dL      ALT (SGPT) 33 U/L      AST (SGOT) 29 U/L      Alkaline Phosphatase 59 U/L      Total Bilirubin 1.9 mg/dL      eGFR Non African Amer 43 mL/min/1.73      Globulin 2.6 gm/dL      A/G Ratio 1.6 g/dL      BUN/Creatinine Ratio 20.0     Anion Gap 21.3 mmol/L     Extra Tubes [[id number]] Collected:  10/02/19 1852    Specimen:  Blood, Venous Line Updated:  10/02/19 2000    Narrative:       The following orders were created for panel order Extra Tubes.  Procedure                               Abnormality         Status                     ---------                               -----------         ------                     Gold Top - [facility][[id number]]                                   Final result                 Please view results for these tests on the individual orders.    Gold Top - [facility] [[id number]] Collected:  10/02/19 1852    Specimen:  Blood Updated:  10/02/19 2000     Extra Tube Hold for add-ons.     Comment: Auto resulted.       [place] Draw [[id number]] Collected:  10/02/19 1852    Specimen:  Blood Updated:  10/02/19 2000    Narrative:       The following orders were created for panel order [place] [facility].  Procedure                               Abnormality         Status                     ---------                               -----------         ------                     Light Blue Top[[id number]]                                   Final result               Green Top (Gel)[[id number]]                                  Final result               Lavender Top[[id number]]                                     Final result               Gold [facility][[id number]]                                   Final result                 Please view  results for these tests on the individual orders.    Light Blue Top [[id number]] Collected:  10/02/19 1852    Specimen:  Blood Updated:  10/02/19 2000     Extra Tube hold for add-on     Comment: Auto resulted       Green Top (Gel) [[id number]] Collected:  10/02/19 1852    Specimen:  Blood Updated:  10/02/19 2000     Extra Tube Hold for add-ons.     Comment: Auto resulted.       Lavender Top [[id number]] Collected:  10/02/19 1852    Specimen:  Blood Updated:  10/02/19 2000     Extra Tube hold for add-on     Comment: Auto resulted       Gold Top - [facility] [[id number]] Collected:  10/02/19 1852    Specimen:  Blood Updated:  10/02/19 2000     Extra Tube Hold for add-ons.     Comment: Auto resulted.       CBC & Differential [[id number]] Collected:  10/02/19 1852    Specimen:  Blood Updated:  10/02/19 1939    Narrative:       The following orders were created for panel order CBC & Differential.  Procedure                               Abnormality         Status                     ---------                               -----------         ------                     CBC Auto Differential[[id number]]        Abnormal            Final result                 Please view results for these tests on the individual orders.    CBC Auto Differential [[id number]]  (Abnormal) Collected:  10/02/19 1852    Specimen:  Blood Updated:  10/02/19 1939     WBC 10.20 10*3/mm3      RBC 4.49 10*6/mm3      Hemoglobin 13.9 g/dL      Hematocrit 41.5 %      MCV 92.2 fL      MCH 30.9 pg      MCHC 33.5 g/dL      RDW 13.5 %      RDW-SD 44.2 fl      MPV 11.1 fL      Platelets 167 10*3/mm3      Neutrophil % 85.8 %      Lymphocyte % 8.7 %      Monocyte % 4.5 %      Eosinophil % 0.3 %      Basophil % 0.7 %      Neutrophils, Absolute 8.80 10*3/mm3      Lymphocytes, Absolute 0.90 10*3/mm3      Monocytes, Absolute 0.50 10*3/mm3      Eosinophils, Absolute 0.00 10*3/mm3      Basophils, Absolute 0.10 10*3/mm3      nRBC 0.0 /100 WBC     POC Glucose Once [[id number]]   (Abnormal) Collected:  10/02/19 1833    Specimen:  Blood Updated:  10/02/19 1834     Glucose >500 mg/dL      Comment: Serial Number: [id number]:  [id number]                    Imaging:      Imaging Results (last 24 hours)     ** No results found for the last 24 hours. **          Assessment:        Hyperglycemia    · Some diabetic ketoacidosis  · Hyperglycemia  · Hyperkalemia secondary to hyperglycemia and metabolic acidosis  · Acute kidney injury because of the volume depletion    Plan:     · Patient may have slight chronic kidney disease because of multiple recent some nephrosclerosis because of this.  But no significant proteinuria  · Renal functions are going back to baseline  · Acidosis and electrolytes are improving  · Hemodynamically stable  · Blood sugar getting better  · No nausea vomiting  · Okay to be discharged when okay with primary    Ludwin Abbasi MD  10/3/2019  10:50 AM

## 2019-10-03 NOTE — ED NOTES
Waiting for insulin drip from pharmacy.  The omnicell in the ER was out of stock.  Called pharmacy.     Cathy Guallpa RN  10/02/19 5360     from [facility] for on and off fever for 3 days,patient non verbal

## 2019-10-03 NOTE — H&P
[facility] HOSPITALIST     Cesar Mcleod MD    Patient Care Team:  Cesar Mcleod MD as PCP - General  Cesar Mcleod MD as PCP - Claims Attributed  Cesar Mcleod MD as PCP - Family Medicine    CHIEF COMPLAINT:     Chief Complaint   Patient presents with   • Hyperglycemia           HISTORY OF PRESENT ILLNESS:    HPI  69 year old male with DM1 using insulin pump for many years ( this is his 4th one about 4 years old) presents with elevated serum glucose > 800. He reports mild nausea but no other complaints. He reports the other day he was hypoglycemic treated at home with glucose tab. Patient reports he knows hoe to use pump and usually has no problems. He believes there is a malfunction in pump. He reports he changed its location and the cartridges but still  Having labile serum glucose. He sees an endocrinologist [facility] He was strted on an insulin drip in ED , normal ph no ketones in urine, IVF started, pump removed. He will be admitted for close monitoring and stabilization of serum glucose with endocrinology and diabetes educator consulted. He denies CAD, has HTN well controlled and GERD. CR today was 1.6- he denies known CKD.     Past Medical History:   Diagnosis Date   • Diabetes mellitus type I, controlled (CMS/HCC)    • Dyslipidemia    • Erectile dysfunction     Impression: viagra causes headache. Try cilais   • Fatigue     Impression: likely multifactorial,Findings discussed. All questions answered. Differential diagnosis discussed. Follow-up after testing complete, sooner for worsening symptoms or any concerns. Recommend sleep study. Wife says pt snores significantly.   • GERD (gastroesophageal reflux disease)    • History of chicken pox    • History of hepatitis C     Impression: s/p tx with harvoni   • History of tobacco use    • Lipoma of flank     Impression: Findings discussed. All questions answered. Reassurance,  education. Natural course and self-limited nature of this condition discussed.   • Medicare annual wellness visit, subsequent     with abnormal findings   • Neck pain     Impression: left sided.   • Nocturia    • ABY (obstructive sleep apnea)     Impression: per sleep study. Pt recently bought new mattress, wants to see how he does with that.   • Overweight (BMI 25.0-29.9)    • Screening for alcoholism    • Screening for depression    • Vitamin D deficiency      History reviewed. No pertinent surgical history.  Family History   Problem Relation Age of Onset   • Diabetes Mother         Diabetes Mellitus    • Coronary artery disease Mother    • Hypertension Mother    • Coronary artery disease Father    • Hypertension Father    • Pancreatic cancer Sister    • Esophageal cancer Brother      Social History     Tobacco Use   • Smoking status: Former Smoker     Last attempt to quit: 2011     Years since quittin.6   Substance Use Topics   • Alcohol use: No     Frequency: Never   • Drug use: No     Medications Prior to Admission   Medication Sig Dispense Refill Last Dose   • cholecalciferol (VITAMIN D3) 400 units tablet Take 400 Units by mouth Daily.   Taking   • Cyanocobalamin (VITAMIN B-12 PO) Take 1 tablet by mouth Daily.   Taking   • insulin aspart (NOVOLOG) 100 UNIT/ML injection Inject  under the skin into the appropriate area as directed Daily. Pump about 45-50 U per day   Taking   • lisinopril (PRINIVIL,ZESTRIL) 20 MG tablet Take 20 mg by mouth Daily.   Taking   • omeprazole (priLOSEC) 20 MG capsule Take 1 capsule by mouth Daily.   Taking     Allergies:  Patient has no known allergies.      There is no immunization history on file for this patient.        REVIEW OF SYSTEMS:     Review of Systems   Constitution: Negative.   HENT: Negative.    Eyes: Negative.    Cardiovascular: Negative.    Respiratory: Negative.    Endocrine: Negative.    Hematologic/Lymphatic: Negative.    Skin: Negative.    Musculoskeletal:  "Positive for back pain.   Gastrointestinal: Positive for nausea.   Genitourinary: Negative.    Neurological: Negative.    Psychiatric/Behavioral: Negative.    Allergic/Immunologic: Negative.        Vital Signs  Temp:  [97.8 °F (36.6 °C)-98.2 °F (36.8 °C)] 97.8 °F (36.6 °C)  Heart Rate:  [75-88] 75  Resp:  [14-18] 18  BP: (126-176)/(61-90) 145/61    Flowsheet Rows      First Filed Value   Admission Height  162.6 cm (64\") Documented at 10/02/2019 1830   Admission Weight  73.1 kg (161 lb 2.5 oz) Documented at 10/02/2019 1830           Physical Exam:    Physical Exam   Constitutional: He is oriented to person, place, and time. He appears well-developed and well-nourished.   HENT:   Head: Normocephalic and atraumatic.   Eyes: EOM are normal.   Neck: Normal range of motion. Neck supple.   Cardiovascular: Normal rate, regular rhythm and normal heart sounds.   Pulmonary/Chest: Effort normal and breath sounds normal.   Abdominal: Bowel sounds are normal.   Musculoskeletal: Normal range of motion.   Neurological: He is alert and oriented to person, place, and time.   Skin: Skin is warm and dry.   Psychiatric: He has a normal mood and affect. His behavior is normal. Judgment and thought content normal.   Vitals reviewed.      Emotional Behavior:    cooperative   Debilities:  Age appropriate      Results Review:    I reviewed the patient's new clinical results.  Lab Results (most recent)     Procedure Component Value Units Date/Time    POC Glucose Once [[id number]] Collected:  10/02/19 2028    Specimen:  Blood Updated:  10/02/19 2032    Blood Gas, Venous [[id number]] Collected:  10/02/19 2028    Specimen:  Venous Blood Updated:  10/02/19 2032    Comprehensive Metabolic Panel [[id number]]  (Abnormal) Collected:  10/02/19 1852    Specimen:  Blood Updated:  10/02/19 2000     Glucose 825 mg/dL      BUN 32 mg/dL      Creatinine 1.60 mg/dL      Sodium 130 mmol/L      Potassium 5.3 mmol/L      Chloride 95 mmol/L      CO2 19.0 mmol/L      " Calcium 9.1 mg/dL      Total Protein 6.7 g/dL      Albumin 4.10 g/dL      ALT (SGPT) 33 U/L      AST (SGOT) 29 U/L      Alkaline Phosphatase 59 U/L      Total Bilirubin 1.9 mg/dL      eGFR Non African Amer 43 mL/min/1.73      Globulin 2.6 gm/dL      A/G Ratio 1.6 g/dL      BUN/Creatinine Ratio 20.0     Anion Gap 21.3 mmol/L     Extra Tubes [[id number]] Collected:  10/02/19 1852    Specimen:  Blood, Venous Line Updated:  10/02/19 2000    Narrative:       The following orders were created for panel order Extra Tubes.  Procedure                               Abnormality         Status                     ---------                               -----------         ------                     Gold Top - SST[[id number]]                                   Final result                 Please view results for these tests on the individual orders.    Gold Top - SST [[id number]] Collected:  10/02/19 1852    Specimen:  Blood Updated:  10/02/19 2000     Extra Tube Hold for add-ons.     Comment: Auto resulted.       Natalia Draw [[id number]] Collected:  10/02/19 1852    Specimen:  Blood Updated:  10/02/19 2000    Narrative:       The following orders were created for panel order Natalia Draw.  Procedure                               Abnormality         Status                     ---------                               -----------         ------                     Light Blue Top[[id number]]                                   Final result               Green Top (Gel)[[id number]]                                  Final result               Lavender Top[[id number]]                                     Final result               Gold Top - SST[[id number]]                                   Final result                 Please view results for these tests on the individual orders.    Light Blue Top [[id number]] Collected:  10/02/19 1852    Specimen:  Blood Updated:  10/02/19 2000     Extra Tube hold for add-on     Comment: Auto resulted       Green  Top (Gel) [[id number]] Collected:  10/02/19 1852    Specimen:  Blood Updated:  10/02/19 2000     Extra Tube Hold for add-ons.     Comment: Auto resulted.       Lavender Top [[id number]] Collected:  10/02/19 1852    Specimen:  Blood Updated:  10/02/19 2000     Extra Tube hold for add-on     Comment: Auto resulted       Gold Top - SST [[id number]] Collected:  10/02/19 1852    Specimen:  Blood Updated:  10/02/19 2000     Extra Tube Hold for add-ons.     Comment: Auto resulted.       CBC & Differential [[id number]] Collected:  10/02/19 1852    Specimen:  Blood Updated:  10/02/19 1939    Narrative:       The following orders were created for panel order CBC & Differential.  Procedure                               Abnormality         Status                     ---------                               -----------         ------                     CBC Auto Differential[[id number]]        Abnormal            Final result                 Please view results for these tests on the individual orders.    CBC Auto Differential [[id number]]  (Abnormal) Collected:  10/02/19 1852    Specimen:  Blood Updated:  10/02/19 1939     WBC 10.20 10*3/mm3      RBC 4.49 10*6/mm3      Hemoglobin 13.9 g/dL      Hematocrit 41.5 %      MCV 92.2 fL      MCH 30.9 pg      MCHC 33.5 g/dL      RDW 13.5 %      RDW-SD 44.2 fl      MPV 11.1 fL      Platelets 167 10*3/mm3      Neutrophil % 85.8 %      Lymphocyte % 8.7 %      Monocyte % 4.5 %      Eosinophil % 0.3 %      Basophil % 0.7 %      Neutrophils, Absolute 8.80 10*3/mm3      Lymphocytes, Absolute 0.90 10*3/mm3      Monocytes, Absolute 0.50 10*3/mm3      Eosinophils, Absolute 0.00 10*3/mm3      Basophils, Absolute 0.10 10*3/mm3      nRBC 0.0 /100 WBC     POC Glucose Once [[id number]]  (Abnormal) Collected:  10/02/19 1833    Specimen:  Blood Updated:  10/02/19 1834     Glucose >500 mg/dL      Comment: Serial Number: [id number]:  [id number]             Imaging Results (most recent)     None         NA    ECG/EMG Results (most recent)     None        reviewed              CT Cardiac Calcium Score Without Dye  DATE OF SERVICE:  12/18/2018 8:08 AM    PROCEDURE:  CT CALCIUM SCORE COMBO    HISTORY:  screening for calcify coronary artery atherosclerotic plaque    COMPARISON:  Plain film study chest performed on April 13, 2016 and CT of the chest  performed on February 21, 2005    TECHNIQUE:  Limited CT of the coronary arteries without contrast    DISCUSSION:  Today study reveals no evidence of calcified atherosclerotic plaque in the  right or left coronary arteries.  Benign calcified granulomas are seen in the  right pulmonary hilum and in the right lung.  The heart size normal.  No  evidence of pericardial effusion.    IMPRESSION:  1. No evidence of calcified atherosclerotic plaque in the right or left  coronary arteries.    Rk Giraldo MD    DS: 12/18/2018 12:15  Report ID: [id number]  cc: NO PHYSICIAN NO PHYSICIAN  UNKNOWN UNKNOWN  MILTON HANLEY  NO PHYSICIAN NO PHYSICIAN  FINAL AUTHENTICATED COPY      Assessment/Plan       Hyperglycemia      Plan    Hyperglycemia , not acidotic HX DM Type 1 on insulin pump for many years - labile blood sugar - patient thinks device malfunction- pump removed on insulin drip with glucose check q 1 hrs until stable , NS IVF, endocrinology and diabetes educator consult       Acute renal failure Cr 1.6 likely from dehydration hyperglycemia see plan above IVF and repeat BMP     Elevated - bilirubin - likely reactive , monitor     HTN- controlled on lisinopril       GERD- on PPI     ABY- declined C Pap use at home       DVT prophylaxis SCD    Disposition     Patient will be admitted for stabilization of serum glucose and endocrinology consult.    I discussed the patients findings and my recommendations with patient and wife.     Nicolas Vicente MD

## 2019-10-03 NOTE — PLAN OF CARE
Problem: Patient Care Overview  Goal: Plan of Care Review  Outcome: Ongoing (interventions implemented as appropriate)   10/03/19 4443   OTHER   Outcome Summary pt. alert and orientated through noc. blodd sugar monitored per protocol. no s/s diabteic distress noted. Wife at bedside through shift.       Problem: Hyperglycemia, Persistent (Adult)  Goal: Signs and Symptoms of Listed Potential Problems Will be Absent, Minimized or Managed (Hyperglycemia, Persistent)  Outcome: Ongoing (interventions implemented as appropriate)

## 2019-10-03 NOTE — CONSULTS
"Diabetes Education  Assessment/Teaching    Patient Name:  Richard Garcia  YOB: 1950  MRN: [id number]  Admit Date:  10/2/2019      Assessment Date:  10/3/2019    Most Recent Value   General Information    Referral From:  MD order [Consult received due to pt wears insulin pump at home and pump malfunctioned. Pt needing education regarding off-pump guidelines.]   Height  162.6 cm (64\")   Height Method  Stated   Weight  72.9 kg (160 lb 11.5 oz)   Weight Method  Bed scale   Pregnancy Assessment   Diabetes History   What type of diabetes do you have?  Type 1   Length of Diabetes Diagnosis  10 + years   Current DM knowledge  fair   Have you had diabetes education/teaching in the past?  yes   When and where [facility] your [facility]   Do you test your blood sugar at home?  yes   Frequency of checks  Pt checking bs 4 times/day   Meter type  Contour Next One (communicates with [employer] insulin pump)   Who performs the test?  self   Typical readings  bs readings vary. Pt had severe low bs on 9/30/2019 per wife and then admitted with bs of 825 mg/dl   Have you had low blood sugar? (<70mg/dl)  yes   How often do you have low blood sugar?  frequently [Pt states may have 2-3 mild low bs per week]   Education Preferences   What areas of diabetes would you like to learn about?  avoiding high blood sugar, avoiding low blood sugar, diabetes complications, medications for diabetes, testing my blood sugar at home   Nutrition Information   Assessment Topics   Taking Medication - Assessment  Needs education   Problem Solving - Assessment  Needs education   Reducing Risk - Assessment  Needs education   Monitoring - Assessment  Needs education   DM Goals   Taking Medication - Goal  Today   Problem Solving - Goal  Today   Reducing Risk - Goal  Today   Monitoring - Goal  Today            Most Recent Value   DM Education Needs   Meter  Has own   Meter Type  Contour   Frequency of Testing  4 times a day   Medication  " Insulin [Pt using Novolog in pump. He states he used to have long-acting insulin at home in case pump malfunctioned but it  and he never replaced it]   Problem Solving  Hypoglycemia, Hyperglycemia, Signs, Symptoms, Treatment   Physical Activity  -- [Pt will work outside in yard for long periods of time. Discussed using temporary basal rate during these times to prevent low bs. ]   Motivation  Strong [Wife present during education and both patient/wife asking many questions about insulin pump therapy]   Teaching Method  Explanation, Discussion, Handouts   Patient Response  Verbalized understanding            Other Comments:  Pt currently receiving insulin injections but plans to restart insulin pump tomorrow when new pump arrives at house. Wife to bring in new pump to hospital for educator to help pt restart his pump. Current pump settings are as follows:    Basal rates:  12 am 0.650  3 am 0.750  7 am 1.35  2 pm 1.60  5 pm 1.85  8 pm 1.40    Insulin:CHO ratio  12 am 1:8  7 am 1:6  8 pm 1:8    Sensitivity 1:50  bs target 100-120  Active insulin: 3 hours    On  when pt had bs in the 20s, wife attempted giving glucose gel. Discussed keeping glucagon at home. Wife states [facility]. Discussed they also need to get rx for long-acting insulin and reviewed off-pump guidelines. Discussed prevention of DKA and importance of changing out infusion set/reservoir/tubing if bs running high and not coming down after bolusing. Gave specific instructions for when to change out infusion sets. Discussed how to manually calculate meal boluses and supplemental boluses since pt used to using bolus wizard in pump. Pt states he will need a review on drawing up insulin into syringe and use of insulin pen because it has been 15 years since he has given himself an injection. Reviewed tx of mild and severe low bs. Discussed nasal glucagon. Discussed temporary basal rate and importance of  using with increased activity. Pt states [facility] how to use some of the advanced features on pump. Gave literature regarding off-pump guidelines and prevention of DKA/hyperglycemia with pump therapy. Educator to follow up.        Electronically signed by:  Viry Villavicencio RN  10/03/19 6:54 PM

## 2019-10-03 NOTE — PROGRESS NOTES
Discharge Planning Assessment   [facility]     Patient Name: Richard Garcia  MRN: [id number]  Today's Date: 10/3/2019    Admit Date: 10/2/2019    Discharge Needs Assessment     Row Name 10/03/19 1407       Living Environment    Lives With  spouse    Current Living Arrangements  home/apartment/condo    Primary Care Provided by  self    Able to Return to Prior Arrangements  yes       Resource/Environmental Concerns    Resource/Environmental Concerns  none    Transportation Concerns  car, none       Transition Planning    Patient/Family Anticipates Transition to  home with family    Patient/Family Anticipated Services at Transition  none    Transportation Anticipated  car, drives self;family or friend will provide       Discharge Needs Assessment    Readmission Within the Last 30 Days  no previous admission in last 30 days    Concerns to be Addressed  no discharge needs identified    Equipment Currently Used at Home  -- Insulin Pump    Anticipated Changes Related to Illness  none    Equipment Needed After Discharge  none        Discharge Plan     Row Name 10/03/19 1409       Plan    Plan  Routine d/c to home                   Demographic Summary     Row Name 10/03/19 1405       General Information    Admission Type  observation    Arrived From  emergency department    Referral Source  admission list    Reason for Consult  discharge planning    Preferred Language  English        Functional Status     Row Name 10/03/19 1406       Functional Status, IADL    Medications  independent    Meal Preparation  independent    Housekeeping  independent    Laundry  independent    Shopping  independent        Jennifer Garzon RN, CM  Office Phone [phone]  Cell [phone]

## 2019-10-03 NOTE — CONSULTS
[facility]    Referring Provider: Dr. Lc Lind  Reason for Consultation: Diabetes evaluation & management.    Patient Care Team:  Cesar Mcleod MD as PCP - General  Cesar Mcleod MD as PCP - Claims Attributed  Cesar Mcleod MD as PCP - Family Medicine    Chief complaint Hyperglycemia      Subjective .     History of present illness:    This is a  69 y.o. male with type 1 Diabetes x 18y, on a MiniMed 630G insulin pump.   Noted increase sudden in blood sugar. No improvement with pump change.  Does not have back up basal insulin at home. Did not try to give Humalog by syringe injection at home.  Called insulin pump Co & a new pump is being shipped.  Came to ER due to very high blood sugar. Admitted in mild DKA.  Placed on the insulin drip. Better by morning.    Review of Systems  Review of Systems   Constitutional: Positive for fatigue.   HENT: Negative for trouble swallowing.    Eyes: Positive for blurred vision.   Gastrointestinal: Positive for nausea. Negative for diarrhea.   Endocrine: Positive for polydipsia. Negative for polyuria.   Genitourinary: Positive for nocturia.   Neurological: Negative for tremors and headache.       History  Past Medical History:   Diagnosis Date   • Diabetes mellitus type I, controlled (CMS/HCC)    • Dyslipidemia    • Erectile dysfunction     Impression: viagra causes headache. Try cilais   • Fatigue     Impression: likely multifactorial,Findings discussed. All questions answered. Differential diagnosis discussed. Follow-up after testing complete, sooner for worsening symptoms or any concerns. Recommend sleep study. Wife says pt snores significantly.   • GERD (gastroesophageal reflux disease)    • History of chicken pox    • History of hepatitis C     Impression: s/p tx with harvoni   • History of tobacco use    • Lipoma of flank     Impression: Findings discussed. All questions answered. Reassurance, education. Natural course  and self-limited nature of this condition discussed.   • Medicare annual wellness visit, subsequent     with abnormal findings   • Neck pain     Impression: left sided.   • Nocturia    • ABY (obstructive sleep apnea)     Impression: per sleep study. Pt recently bought new mattress, wants to see how he does with that.   • Overweight (BMI 25.0-29.9)    • Screening for alcoholism    • Screening for depression    • Vitamin D deficiency      History reviewed. No pertinent surgical history.  Family History   Problem Relation Age of Onset   • Diabetes Mother         Diabetes Mellitus    • Coronary artery disease Mother    • Hypertension Mother    • Coronary artery disease Father    • Hypertension Father    • Pancreatic cancer Sister    • Esophageal cancer Brother      Social History     Tobacco Use   • Smoking status: Former Smoker     Last attempt to quit: 2011     Years since quittin.6   Substance Use Topics   • Alcohol use: No     Frequency: Never   • Drug use: No     Medications Prior to Admission   Medication Sig Dispense Refill Last Dose   • cholecalciferol (VITAMIN D3) 400 units tablet Take 400 Units by mouth Daily.   Taking   • Cyanocobalamin (VITAMIN B-12 PO) Take 1 tablet by mouth Daily.   Taking   • insulin aspart (NOVOLOG) 100 UNIT/ML injection Inject  under the skin into the appropriate area as directed Daily. Pump about 45-50 U per day   Taking   • lisinopril (PRINIVIL,ZESTRIL) 20 MG tablet Take 20 mg by mouth Daily.   Taking   • omeprazole (priLOSEC) 20 MG capsule Take 1 capsule by mouth Daily.   Taking     Scheduled Meds:    insulin glargine 20 Units Subcutaneous QAM   insulin lispro 0-7 Units Subcutaneous 4x Daily With Meals & Nightly   insulin lispro 6 Units Subcutaneous TID With Meals   lisinopril 20 mg Oral Daily   pantoprazole 40 mg Oral Q AM   sodium chloride 10 mL Intravenous Q12H     Continuous Infusions:    insulin regular infusion 1 unit/mL 0.1 Units/kg/hr Last Rate: Stopped (10/03/19  0200)   insulin 1-20 Units/hr Last Rate: 4 Units/hr (10/03/19 0605)   sodium chloride 125 mL/hr Last Rate: 125 mL/hr (10/03/19 0620)     PRN Meds:  •  dextrose  •  dextrose  •  dextrose  •  glucagon (human recombinant)  •  nitroglycerin  •  ondansetron **OR** ondansetron  •  sodium chloride  •  sodium chloride  Allergies:  Patient has no known allergies.    Objective     Vital Signs   Temp:  [97.7 °F (36.5 °C)-98.6 °F (37 °C)] 97.7 °F (36.5 °C)  Heart Rate:  [66-88] 69  Resp:  [14-18] 17  BP: (126-176)/(53-90) 157/73    Physical Exam:     General Appearance:    Alert, cooperative, in no acute distress   Head:    Normocephalic, without obvious abnormality, atraumatic   Eyes:            Lids and lashes normal, conjunctivae and sclerae normal, no   icterus, no pallor, corneas clear, PERRLA   Throat:   No oral lesions,  oral mucosa moist   Neck:   No adenopathy, supple,  no thyromegaly, no   carotid bruit   Lungs:     Clear     Heart:    Regular rhythm and normal rate   Chest Wall:    No abnormalities observed   Abdomen:     Normal bowel sounds, soft                 Extremities:   Moves all extremities well, no edema               Pulses:   Pulses palpable and equal bilaterally   Skin:   No rash   Neurologic:  DTR absent, able to feel the 10g monofilament       Results Review  I have reviewed the patient's new clinical results, labs & imaging.    Lab Results (last 24 hours)     Procedure Component Value Units Date/Time    POC Glucose Once [[id number]]  (Abnormal) Collected:  10/03/19 1624    Specimen:  Blood Updated:  10/03/19 1628     Glucose 174 mg/dL      Comment: Serial Number: [id number]:  [id number]       POC Glucose Once [[id number]]  (Normal) Collected:  10/03/19 1359    Specimen:  Blood Updated:  10/03/19 1400     Glucose 88 mg/dL      Comment: Serial Number: [id number]:  [id number]       POC Glucose Once [[id number]]  (Abnormal) Collected:  10/03/19 1344    Specimen:  Blood Updated:  10/03/19 1349      Glucose 56 mg/dL      Comment: Serial Number: [id number]:  [id number]       POC Glucose Once [[id number]]  (Abnormal) Collected:  10/03/19 1158    Specimen:  Blood Updated:  10/03/19 1208     Glucose 108 mg/dL      Comment: Serial Number: [id number]:  [id number]       POC Glucose Once [[id number]]  (Abnormal) Collected:  10/03/19 0910    Specimen:  Blood Updated:  10/03/19 0911     Glucose 114 mg/dL      Comment: Serial Number: [id number]:  [id number]       POC Glucose Once [[id number]]  (Abnormal) Collected:  10/03/19 0823    Specimen:  Blood Updated:  10/03/19 0824     Glucose 112 mg/dL      Comment: Serial Number: [id number]:  [id number]       POC Glucose Once [[id number]]  (Abnormal) Collected:  10/03/19 0712    Specimen:  Blood Updated:  10/03/19 0713     Glucose 157 mg/dL      Comment: Serial Number: [id number]:  [id number]       POC Glucose Once [[id number]]  (Abnormal) Collected:  10/03/19 0600    Specimen:  Blood Updated:  10/03/19 0601     Glucose 192 mg/dL      Comment: Serial Number: [id number]:  [id number]       POC Glucose Once [[id number]]  (Abnormal) Collected:  10/03/19 0508    Specimen:  Blood Updated:  10/03/19 0509     Glucose 141 mg/dL      Comment: Serial Number: [id number]:  [id number]       POC Glucose Once [[id number]]  (Abnormal) Collected:  10/03/19 0405    Specimen:  Blood Updated:  10/03/19 0406     Glucose 126 mg/dL      Comment: Serial Number: [id number]:  [id number]       Basic Metabolic Panel [[id number]]  (Abnormal) Collected:  10/03/19 0300    Specimen:  Blood Updated:  10/03/19 0353     Glucose 96 mg/dL      BUN 24 mg/dL      Creatinine 1.20 mg/dL      Sodium 142 mmol/L      Potassium 4.0 mmol/L      Chloride 106 mmol/L      CO2 28.0 mmol/L      Calcium 8.9 mg/dL      eGFR Non African Amer 60 mL/min/1.73      BUN/Creatinine Ratio 20.0     Anion Gap 12.0 mmol/L     CBC Auto Differential [[id number]]  (Abnormal) Collected:  10/03/19 7177     Specimen:  Blood Updated:  10/03/19 0321     WBC 9.60 10*3/mm3      RBC 4.33 10*6/mm3      Hemoglobin 13.1 g/dL      Hematocrit 39.1 %      MCV 90.3 fL      MCH 30.4 pg      MCHC 33.6 g/dL      RDW 13.4 %      RDW-SD 42.0 fl      MPV 9.7 fL      Platelets 179 10*3/mm3      Neutrophil % 54.8 %      Lymphocyte % 30.5 %      Monocyte % 12.2 %      Eosinophil % 1.6 %      Basophil % 0.9 %      Neutrophils, Absolute 5.20 10*3/mm3      Lymphocytes, Absolute 2.90 10*3/mm3      Monocytes, Absolute 1.20 10*3/mm3      Eosinophils, Absolute 0.20 10*3/mm3      Basophils, Absolute 0.10 10*3/mm3      nRBC 0.1 /100 WBC     POC Glucose Once [[id number]]  (Normal) Collected:  10/03/19 0302    Specimen:  Blood Updated:  10/03/19 0304     Glucose 96 mg/dL      Comment: Serial Number: [id number]:  [id number]       POC Glucose Once [[id number]]  (Normal) Collected:  10/03/19 0208    Specimen:  Blood Updated:  10/03/19 0210     Glucose 72 mg/dL      Comment: Serial Number: [id number]:  [id number]       POC Glucose Once [[id number]]  (Abnormal) Collected:  10/03/19 0100    Specimen:  Blood Updated:  10/03/19 0101     Glucose 143 mg/dL      Comment: Serial Number: [id number]:  [id number]       POC Glucose Once [[id number]]  (Abnormal) Collected:  10/03/19 0002    Specimen:  Blood Updated:  10/03/19 0004     Glucose 225 mg/dL      Comment: Serial Number: [id number]:  [id number]       POC Glucose Once [[id number]]  (Abnormal) Collected:  10/02/19 2339    Specimen:  Blood Updated:  10/02/19 2340     Glucose 252 mg/dL      Comment: Serial Number: [id number]:  [id number]       Urinalysis With Culture If Indicated - Urine, Clean Catch [[id number]]  (Abnormal) Collected:  10/02/19 2140    Specimen:  Urine, Clean Catch Updated:  10/02/19 2204     Color, UA Yellow     Appearance, UA Clear     pH, UA 5.5     Specific Gravity, UA 1.027     Glucose, UA >=1000 mg/dL (3+)     Ketones, UA Trace     Bilirubin, UA Negative     Blood, UA  Negative     Protein, UA Negative     Leuk Esterase, UA Negative     Nitrite, UA Negative     Urobilinogen, UA 0.2 E.U./dL    Narrative:       Urine microscopic not indicated.    POC Glucose Once [[id number]]  (Abnormal) Collected:  10/02/19 2129    Specimen:  Blood Updated:  10/02/19 2130     Glucose 458 mg/dL      Comment: Serial Number: [id number]:  [id number]       POC Glucose Once [[id number]] Collected:  10/02/19 2028    Specimen:  Blood Updated:  10/02/19 2032    Blood Gas, Venous [[id number]] Collected:  10/02/19 2028    Specimen:  Venous Blood Updated:  10/02/19 2032    Comprehensive Metabolic Panel [[id number]]  (Abnormal) Collected:  10/02/19 1852    Specimen:  Blood Updated:  10/02/19 2000     Glucose 825 mg/dL      BUN 32 mg/dL      Creatinine 1.60 mg/dL      Sodium 130 mmol/L      Potassium 5.3 mmol/L      Chloride 95 mmol/L      CO2 19.0 mmol/L      Calcium 9.1 mg/dL      Total Protein 6.7 g/dL      Albumin 4.10 g/dL      ALT (SGPT) 33 U/L      AST (SGOT) 29 U/L      Alkaline Phosphatase 59 U/L      Total Bilirubin 1.9 mg/dL      eGFR Non African Amer 43 mL/min/1.73      Globulin 2.6 gm/dL      A/G Ratio 1.6 g/dL      BUN/Creatinine Ratio 20.0     Anion Gap 21.3 mmol/L     Extra Tubes [[id number]] Collected:  10/02/19 1852    Specimen:  Blood, Venous Line Updated:  10/02/19 2000    Narrative:       The following orders were created for panel order Extra Tubes.  Procedure                               Abnormality         Status                     ---------                               -----------         ------                     Gold Top - SST[[id number]]                                   Final result                 Please view results for these tests on the individual orders.    Gold Top - SST [[id number]] Collected:  10/02/19 1852    Specimen:  Blood Updated:  10/02/19 2000     Extra Tube Hold for add-ons.     Comment: Auto resulted.       [place] Draw [[id number]] Collected:  10/02/19 1852     Specimen:  Blood Updated:  10/02/19 2000    Narrative:       The following orders were created for panel order [place] Draw.  Procedure                               Abnormality         Status                     ---------                               -----------         ------                     Light Blue Top[[id number]]                                   Final result               Green Top (Gel)[[id number]]                                  Final result               Lavender Top[[id number]]                                     Final result               Gold Top - SST[[id number]]                                   Final result                 Please view results for these tests on the individual orders.    Light Blue Top [[id number]] Collected:  10/02/19 1852    Specimen:  Blood Updated:  10/02/19 2000     Extra Tube hold for add-on     Comment: Auto resulted       Green Top (Gel) [[id number]] Collected:  10/02/19 1852    Specimen:  Blood Updated:  10/02/19 2000     Extra Tube Hold for add-ons.     Comment: Auto resulted.       Lavender Top [[id number]] Collected:  10/02/19 1852    Specimen:  Blood Updated:  10/02/19 2000     Extra Tube hold for add-on     Comment: Auto resulted       Gold Top - SST [[id number]] Collected:  10/02/19 1852    Specimen:  Blood Updated:  10/02/19 2000     Extra Tube Hold for add-ons.     Comment: Auto resulted.       CBC & Differential [[id number]] Collected:  10/02/19 1852    Specimen:  Blood Updated:  10/02/19 1939    Narrative:       The following orders were created for panel order CBC & Differential.  Procedure                               Abnormality         Status                     ---------                               -----------         ------                     CBC Auto Differential[[id number]]        Abnormal            Final result                 Please view results for these tests on the individual orders.    CBC Auto Differential [[id number]]  (Abnormal) Collected:   10/02/19 1852    Specimen:  Blood Updated:  10/02/19 1939     WBC 10.20 10*3/mm3      RBC 4.49 10*6/mm3      Hemoglobin 13.9 g/dL      Hematocrit 41.5 %      MCV 92.2 fL      MCH 30.9 pg      MCHC 33.5 g/dL      RDW 13.5 %      RDW-SD 44.2 fl      MPV 11.1 fL      Platelets 167 10*3/mm3      Neutrophil % 85.8 %      Lymphocyte % 8.7 %      Monocyte % 4.5 %      Eosinophil % 0.3 %      Basophil % 0.7 %      Neutrophils, Absolute 8.80 10*3/mm3      Lymphocytes, Absolute 0.90 10*3/mm3      Monocytes, Absolute 0.50 10*3/mm3      Eosinophils, Absolute 0.00 10*3/mm3      Basophils, Absolute 0.10 10*3/mm3      nRBC 0.0 /100 WBC     POC Glucose Once [[id number]]  (Abnormal) Collected:  10/02/19 1833    Specimen:  Blood Updated:  10/02/19 1834     Glucose >500 mg/dL      Comment: Serial Number: [id number]:  [id number]           No results found for: HGBA1C  Lab Results   Component Value Date    TSH 3.20 04/26/2019         Assessment/Plan      1. Diabetes type 1, with hyperglycemia    Blood sugars better this am. Not acidotic. Will start basal / bolus insulin since new pump won't be available until tomorrow.  Discontinue insulin drip 1h after 1st Lantus dose.  Diabetes educator will see pt for pump failure plan instructions.  Ok to discharge home from endocrinology standpoint.  [facility]  Thank you for the consult.      I discussed the patients findings and my recommendations with patient    Priti Matthew MD  10/03/19  4:36 PM

## 2019-10-03 NOTE — NURSING NOTE
"Blood sugar 72. Pt. Reports he is feeling a little \"different\". Per insulin protocol orders pt. Insulin gtt should be left at current rate and infusing, however pt. Does not feel comfortable with this. Reports he will \"drop out\". Insulin stopped at this time per pt. Request. Snack requested. Peanut butter crackers and diet cola given before noted NPO status.  Spoke with ARNP. Pt. Did not qualify for protocol order to \"stop infusion at 70 and below and adm. D50\" but does not want to cont. Drip at 72. Spoke with pt. About order and agreeable to rest of protocol order to check blood sugar every one hour, restart at half of previous rate when blood sugar reaches 188. ARNP made aware and  in agreement with this plan as well.  "

## 2019-10-04 VITALS
WEIGHT: 162.04 LBS | RESPIRATION RATE: 16 BRPM | HEART RATE: 75 BPM | OXYGEN SATURATION: 92 % | BODY MASS INDEX: 27.66 KG/M2 | DIASTOLIC BLOOD PRESSURE: 65 MMHG | HEIGHT: 64 IN | SYSTOLIC BLOOD PRESSURE: 147 MMHG | TEMPERATURE: 98.1 F

## 2019-10-04 LAB
ALBUMIN SERPL-MCNC: 3.3 G/DL (ref 3.5–4.8)
ALBUMIN/GLOB SERPL: 1.5 G/DL (ref 1–1.7)
ALP SERPL-CCNC: 42 U/L (ref 32–91)
ALT SERPL W P-5'-P-CCNC: 21 U/L (ref 17–63)
ANION GAP SERPL CALCULATED.3IONS-SCNC: 10.6 MMOL/L (ref 5–15)
AST SERPL-CCNC: 19 U/L (ref 15–41)
BASOPHILS # BLD AUTO: 0.1 10*3/MM3 (ref 0–0.2)
BASOPHILS NFR BLD AUTO: 0.7 % (ref 0–1.5)
BILIRUB SERPL-MCNC: 1.5 MG/DL (ref 0.3–1.2)
BUN BLD-MCNC: 20 MG/DL (ref 8–20)
BUN/CREAT SERPL: 20 (ref 6.2–20.3)
CALCIUM SPEC-SCNC: 8.3 MG/DL (ref 8.9–10.3)
CHLORIDE SERPL-SCNC: 105 MMOL/L (ref 101–111)
CO2 SERPL-SCNC: 29 MMOL/L (ref 22–32)
CREAT BLD-MCNC: 1 MG/DL (ref 0.7–1.2)
DEPRECATED RDW RBC AUTO: 42 FL (ref 37–54)
EOSINOPHIL # BLD AUTO: 0.2 10*3/MM3 (ref 0–0.4)
EOSINOPHIL NFR BLD AUTO: 3 % (ref 0.3–6.2)
ERYTHROCYTE [DISTWIDTH] IN BLOOD BY AUTOMATED COUNT: 13.4 % (ref 12.3–15.4)
GFR SERPL CREATININE-BSD FRML MDRD: 74 ML/MIN/1.73
GLOBULIN UR ELPH-MCNC: 2.2 GM/DL (ref 2.5–3.8)
GLUCOSE BLD-MCNC: 63 MG/DL (ref 65–99)
GLUCOSE BLDC GLUCOMTR-MCNC: 100 MG/DL (ref 70–105)
GLUCOSE BLDC GLUCOMTR-MCNC: 165 MG/DL (ref 70–105)
GLUCOSE BLDC GLUCOMTR-MCNC: 224 MG/DL (ref 70–105)
GLUCOSE BLDC GLUCOMTR-MCNC: 238 MG/DL (ref 70–105)
HCT VFR BLD AUTO: 36.9 % (ref 37.5–51)
HGB BLD-MCNC: 12.4 G/DL (ref 13–17.7)
LYMPHOCYTES # BLD AUTO: 3.1 10*3/MM3 (ref 0.7–3.1)
LYMPHOCYTES NFR BLD AUTO: 40.5 % (ref 19.6–45.3)
MCH RBC QN AUTO: 30.3 PG (ref 26.6–33)
MCHC RBC AUTO-ENTMCNC: 33.6 G/DL (ref 31.5–35.7)
MCV RBC AUTO: 90.2 FL (ref 79–97)
MONOCYTES # BLD AUTO: 0.4 10*3/MM3 (ref 0.1–0.9)
MONOCYTES NFR BLD AUTO: 5.6 % (ref 5–12)
NEUTROPHILS # BLD AUTO: 3.9 10*3/MM3 (ref 1.7–7)
NEUTROPHILS NFR BLD AUTO: 50.2 % (ref 42.7–76)
NRBC BLD AUTO-RTO: 0 /100 WBC (ref 0–0.2)
PLATELET # BLD AUTO: 157 10*3/MM3 (ref 140–450)
PMV BLD AUTO: 10.2 FL (ref 6–12)
POTASSIUM BLD-SCNC: 3.6 MMOL/L (ref 3.6–5.1)
PROT SERPL-MCNC: 5.5 G/DL (ref 6.1–7.9)
RBC # BLD AUTO: 4.09 10*6/MM3 (ref 4.14–5.8)
SODIUM BLD-SCNC: 141 MMOL/L (ref 136–144)
WBC NRBC COR # BLD: 7.7 10*3/MM3 (ref 3.4–10.8)

## 2019-10-04 PROCEDURE — 63710000001 INSULIN LISPRO (HUMAN) PER 5 UNITS: Performed by: INTERNAL MEDICINE

## 2019-10-04 PROCEDURE — 99213 OFFICE O/P EST LOW 20 MIN: CPT | Performed by: INTERNAL MEDICINE

## 2019-10-04 PROCEDURE — 82962 GLUCOSE BLOOD TEST: CPT

## 2019-10-04 PROCEDURE — G0108 DIAB MANAGE TRN  PER INDIV: HCPCS

## 2019-10-04 PROCEDURE — 85025 COMPLETE CBC W/AUTO DIFF WBC: CPT | Performed by: INTERNAL MEDICINE

## 2019-10-04 PROCEDURE — G0378 HOSPITAL OBSERVATION PER HR: HCPCS

## 2019-10-04 PROCEDURE — 80053 COMPREHEN METABOLIC PANEL: CPT | Performed by: INTERNAL MEDICINE

## 2019-10-04 PROCEDURE — 99217 PR OBSERVATION CARE DISCHARGE MANAGEMENT: CPT | Performed by: INTERNAL MEDICINE

## 2019-10-04 PROCEDURE — 63710000001 INSULIN GLARGINE PER 5 UNITS: Performed by: INTERNAL MEDICINE

## 2019-10-04 RX ADMIN — INSULIN LISPRO 2 UNITS: 100 INJECTION, SOLUTION INTRAVENOUS; SUBCUTANEOUS at 09:59

## 2019-10-04 RX ADMIN — LISINOPRIL 20 MG: 20 TABLET ORAL at 09:57

## 2019-10-04 RX ADMIN — Medication 10 ML: at 09:57

## 2019-10-04 RX ADMIN — INSULIN LISPRO 6 UNITS: 100 INJECTION, SOLUTION INTRAVENOUS; SUBCUTANEOUS at 13:27

## 2019-10-04 RX ADMIN — INSULIN LISPRO 6 UNITS: 100 INJECTION, SOLUTION INTRAVENOUS; SUBCUTANEOUS at 09:58

## 2019-10-04 RX ADMIN — INSULIN GLARGINE 20 UNITS: 100 INJECTION, SOLUTION SUBCUTANEOUS at 06:04

## 2019-10-04 RX ADMIN — PANTOPRAZOLE SODIUM 40 MG: 40 TABLET, DELAYED RELEASE ORAL at 06:04

## 2019-10-04 NOTE — PROGRESS NOTES
"[facility] Hospitalist Team      Patient Care Team:  Cesar Mcleod MD as PCP - General  Cesar Mcleod MD as PCP - Claims Attributed  Cesar Mcleod MD as PCP - Family Medicine        Chief Complaint:  hyperglycemia    Subjective:     69 year old male with DM1 using insulin pump for many years ( this is his 4th one about 4 years old) presents with elevated serum glucose > 800. He reports mild nausea but no other complaints. He reports the other day he was hypoglycemic treated at home with glucose tab. Patient reports he knows hoe to use pump and usually has no problems. He believes there is a malfunction in pump. He reports he changed its location and the cartridges but still  Having labile serum glucose. He sees an endocrinologist [facility] He was strted on an insulin drip in ED , normal ph no ketones in urine, IVF started, pump removed. He will be admitted for close monitoring and stabilization of serum glucose with endocrinology and diabetes educator consulted. He denies CAD, has HTN well controlled and GERD. CR today was 1.6- he denies known CKD    Objective: alert/nad/ expects new insulin pump this am and will have diabetic educator set it up        Vital Signs  Temp:  [97.7 °F (36.5 °C)-98.3 °F (36.8 °C)] 98.2 °F (36.8 °C)  Heart Rate:  [55-73] 63  Resp:  [12-71] 12  BP: (125-159)/(52-73) 125/55  Oxygen Therapy  SpO2: 92 %  Pulse Oximetry Type: Continuous  Device (Oxygen Therapy): room air  Flowsheet Rows      First Filed Value   Admission Height  162.6 cm (64\") Documented at 10/02/2019 1830   Admission Weight  73.1 kg (161 lb 2.5 oz) Documented at 10/02/2019 1830        Intake & Output (last 3 days)       10/01 0701 - 10/02 0700 10/02 0701 - 10/03 0700 10/03 0701 - 10/04 0700    P.O.   720    Total Intake(mL/kg)   720 (9.8)    Urine (mL/kg/hr)   300 (0.2)    Total Output   300    Net   +420               Lines, Drains & Airways    Active LDAs     Name:   " Placement date:   Placement time:   Site:   Days:    Peripheral IV 10/02/19 1852 Right;Lateral Antecubital   10/02/19    1852    Antecubital   less than 1                Physical Exam:    General Appearance:    Alert, cooperative, in no acute distress   Head:    Normocephalic, without obvious abnormality, atraumatic   Eyes:            Lids and lashes normal, conjunctivae and sclerae normal, no   icterus, no pallor, corneas clear, PERRLA   Back:     No kyphosis present, no scoliosis present, no skin lesions,      erythema or scars, no tenderness to percussion or                   palpation,   range of motion normal   Lungs:     Clear to auscultation,respirations regular, even and                  unlabored    Heart:    Regular rhythm and normal rate, normal S1 and S2, no            murmur, no gallop, no rub, no click   Chest Wall:    No abnormalities observed   Abdomen:     Normal bowel sounds, no masses, no organomegaly, soft        non-tender, non-distended, no guarding, no rebound                tenderness   Extremities:   Moves all extremities well, no edema, no cyanosis, no             redness       Results Review:       Lab Results (last 24 hours)     Procedure Component Value Units Date/Time    POC Glucose Once [[id number]]  (Normal) Collected:  10/04/19 0520    Specimen:  Blood Updated:  10/04/19 0522     Glucose 100 mg/dL      Comment: Serial Number: [id number]:  [id number]       Comprehensive Metabolic Panel [[id number]]  (Abnormal) Collected:  10/04/19 0256    Specimen:  Blood Updated:  10/04/19 0436     Glucose 63 mg/dL      BUN 20 mg/dL      Creatinine 1.00 mg/dL      Sodium 141 mmol/L      Potassium 3.6 mmol/L      Chloride 105 mmol/L      CO2 29.0 mmol/L      Calcium 8.3 mg/dL      Total Protein 5.5 g/dL      Albumin 3.30 g/dL      ALT (SGPT) 21 U/L      AST (SGOT) 19 U/L      Alkaline Phosphatase 42 U/L      Total Bilirubin 1.5 mg/dL      eGFR Non African Amer 74 mL/min/1.73      Globulin 2.2  gm/dL      A/G Ratio 1.5 g/dL      BUN/Creatinine Ratio 20.0     Anion Gap 10.6 mmol/L     CBC & Differential [[id number]] Collected:  10/04/19 0256    Specimen:  Blood Updated:  10/04/19 0413    Narrative:       The following orders were created for panel order CBC & Differential.  Procedure                               Abnormality         Status                     ---------                               -----------         ------                     CBC Auto Differential[[id number]]        Abnormal            Final result                 Please view results for these tests on the individual orders.    CBC Auto Differential [[id number]]  (Abnormal) Collected:  10/04/19 0256    Specimen:  Blood Updated:  10/04/19 0413     WBC 7.70 10*3/mm3      RBC 4.09 10*6/mm3      Hemoglobin 12.4 g/dL      Hematocrit 36.9 %      MCV 90.2 fL      MCH 30.3 pg      MCHC 33.6 g/dL      RDW 13.4 %      RDW-SD 42.0 fl      MPV 10.2 fL      Platelets 157 10*3/mm3      Neutrophil % 50.2 %      Lymphocyte % 40.5 %      Monocyte % 5.6 %      Eosinophil % 3.0 %      Basophil % 0.7 %      Neutrophils, Absolute 3.90 10*3/mm3      Lymphocytes, Absolute 3.10 10*3/mm3      Monocytes, Absolute 0.40 10*3/mm3      Eosinophils, Absolute 0.20 10*3/mm3      Basophils, Absolute 0.10 10*3/mm3      nRBC 0.0 /100 WBC     POC Glucose Once [[id number]]  (Abnormal) Collected:  10/03/19 2016    Specimen:  Blood Updated:  10/03/19 2017     Glucose 280 mg/dL      Comment: Serial Number: [id number]:  [id number]       POC Glucose Once [[id number]]  (Abnormal) Collected:  10/03/19 1624    Specimen:  Blood Updated:  10/03/19 1628     Glucose 174 mg/dL      Comment: Serial Number: [id number]:  [id number]       POC Glucose Once [[id number]]  (Normal) Collected:  10/03/19 1359    Specimen:  Blood Updated:  10/03/19 1400     Glucose 88 mg/dL      Comment: Serial Number: [id number]:  [id number]       POC Glucose Once [[id number]]  (Abnormal) Collected:   10/03/19 1344    Specimen:  Blood Updated:  10/03/19 1349     Glucose 56 mg/dL      Comment: Serial Number: [id number]:  [id number]       POC Glucose Once [[id number]]  (Abnormal) Collected:  10/03/19 1158    Specimen:  Blood Updated:  10/03/19 1208     Glucose 108 mg/dL      Comment: Serial Number: [id number]:  [id number]       POC Glucose Once [[id number]]  (Abnormal) Collected:  10/03/19 0910    Specimen:  Blood Updated:  10/03/19 0911     Glucose 114 mg/dL      Comment: Serial Number: [id number]:  [id number]       POC Glucose Once [[id number]]  (Abnormal) Collected:  10/03/19 0823    Specimen:  Blood Updated:  10/03/19 0824     Glucose 112 mg/dL      Comment: Serial Number: [id number]:  [id number]       POC Glucose Once [[id number]]  (Abnormal) Collected:  10/03/19 0712    Specimen:  Blood Updated:  10/03/19 0713     Glucose 157 mg/dL      Comment: Serial Number: [id number]:  [id number]             Imaging Results (last 24 hours)     ** No results found for the last 24 hours. **                                   I reviewed the patient's new clinical results.          ECG/EMG Results (most recent)     None            Medication Review:       Current Facility-Administered Medications:   •  acetaminophen (TYLENOL) tablet 650 mg, 650 mg, Oral, Q6H PRN, Maria Luisa Keyes, ADRIANA, 650 mg at 10/03/19 2224  •  dextrose (D50W) 25 g/ 50mL Intravenous Solution 25 g, 25 g, Intravenous, Q15 Min PRN, Priti Matthew MD  •  dextrose (D50W) 25 g/ 50mL Intravenous Solution 25-50 mL, 25-50 mL, Intravenous, Q30 Min PRN, Isabell Watson APRN  •  dextrose (GLUTOSE) oral gel 15 g, 15 g, Oral, Q15 Min PRN, Priti Matthew MD  •  glucagon (human recombinant) (GLUCAGEN DIAGNOSTIC) injection 1 mg, 1 mg, Subcutaneous, Q15 Min PRN, Priti Matthew MD  •  insulin glargine (LANTUS) injection 20 Units, 20 Units, Subcutaneous, QAM, Genaro Matthewi, MD, 20 Units at 10/04/19 0604  •  insulin lispro (humaLOG)  injection 0-7 Units, 0-7 Units, Subcutaneous, 4x Daily With Meals & Nightly, Priti Matthew MD, 4 Units at 10/03/19 2115  •  insulin lispro (humaLOG) injection 6 Units, 6 Units, Subcutaneous, TID With Meals, Priti Matthew MD, 5 Units at 10/03/19 1941  •  insulin regular (HumuLIN R,NovoLIN R) 100 Units in sodium chloride 0.9 % 100 mL (1 Units/mL) infusion, 0.1 Units/kg/hr, Intravenous, Titrated, Cesar Villanueva NP, Stopped at 10/03/19 0200  •  insulin regular 1 Units/mL in sodium chloride 0.9 % 100 mL infusion, 1-20 Units/hr, Intravenous, Titrated, Isabell Watson APRN, Last Rate: 4 mL/hr at 10/03/19 0605, 4 Units/hr at 10/03/19 0605  •  lisinopril (PRINIVIL,ZESTRIL) tablet 20 mg, 20 mg, Oral, Daily, Isabell Watson APRN, 20 mg at 10/03/19 1019  •  nitroglycerin (NITROSTAT) SL tablet 0.4 mg, 0.4 mg, Sublingual, Q5 Min PRN, Isabell Watson APRN  •  ondansetron (ZOFRAN) tablet 4 mg, 4 mg, Oral, Q6H PRN **OR** ondansetron (ZOFRAN) injection 4 mg, 4 mg, Intravenous, Q6H PRN, Isabell Watson APRN  •  pantoprazole (PROTONIX) EC tablet 40 mg, 40 mg, Oral, Q AM, Isabell Watson APRN, 40 mg at 10/04/19 0604  •  sodium chloride 0.9 % flush 10 mL, 10 mL, Intravenous, PRN, Lc Lind Jr., MD  •  sodium chloride 0.9 % flush 10 mL, 10 mL, Intravenous, Q12H, Isabell Watson APRN, 10 mL at 10/03/19 2115  •  sodium chloride 0.9 % flush 10 mL, 10 mL, Intravenous, PRN, Isabell Watson APRN  •  sodium chloride 0.9 % infusion, 125 mL/hr, Intravenous, Continuous, Essing-Isabell Melgar APRN, Last Rate: 125 mL/hr at 10/03/19 0620, 125 mL/hr at 10/03/19 0620    I have reviewed the patient's current medication list    Assessment/Plan     Hyperglycemia , not acidotic HX DM Type 1 on insulin pump for many years - labile blood sugar - patient thinks device malfunction. New pump today then home        Acute renal failure Cr 1.6 likely from dehydration hyperglycemia see plan above IVF  and repeat BMP      Elevated - bilirubin - likely reactive , monitor      HTN- controlled on lisinopril         GERD- on PPI      ABY- declined C Pap use at home          Plan for disposition:home    Lc Lind Jr., MD  10/04/19  6:40 AM      Time:

## 2019-10-04 NOTE — PROGRESS NOTES
"Subjective   Richard Garcia is a 69 y.o. male.   Seen for diabetes f/u.  Diabetes educator worked with pt & wife yesterday re pump failure & hypoglycemia RX guide lines.  Wife is bringing new insulin pump just delivery @ his house this am.      Objective     /65 (BP Location: Left arm, Patient Position: Lying)   Pulse 75   Temp 98.1 °F (36.7 °C) (Oral)   Resp 16   Ht 162.6 cm (64\")   Wt 73.5 kg (162 lb 0.6 oz)   SpO2 92%   BMI 27.81 kg/m²   Blood sugar  165 this am,  224 @ lunch    ASSESSMENT    Patient is Improving    PLAN    Ok to discharge home from endocrinology standpoint.  DM educator will assist pt setting up new pump before he leaves.  Since he already received his am Lantus dose, he won't start the pump until tomorrow.         Priti Matthew MD  10/4/2019  12:17 PM    "

## 2019-10-04 NOTE — CONSULTS
"Diabetes Education  Assessment/Teaching    Patient Name:  Richard Garcia  YOB: 1950  MRN: [id number]  Admit Date:  10/2/2019      Assessment Date:  10/4/2019    Most Recent Value   General Information    Referral From:  MD torres   Height  162.6 cm (64\")   Height Method  Stated   Weight  73.5 kg (162 lb 0.6 oz)   Weight Method  Bed scale   Pregnancy Assessment   Diabetes History   What type of diabetes do you have?  Type 1   Length of Diabetes Diagnosis  10 + years   Current DM knowledge  fair   Have you had diabetes education/teaching in the past?  yes   When and where was your diabetes education?  [facility]   Do you test your blood sugar at home?  yes   Frequency of checks  Pt checking bs 4 times/day   Meter type  Contour Next One (communicates with [employer] insulin pump)   Who performs the test?  self   Typical readings  bs readings vary. Pt had severe low bs on 9/30/2019 per wife and then admitted with bs of 825 mg/dl   Have you had low blood sugar? (<70mg/dl)  yes   How often do you have low blood sugar?  frequently [Pt states may have 2-3 mild low bs per week]   Education Preferences   What areas of diabetes would you like to learn about?  insulin pumps, medications for diabetes   Nutrition Information   Assessment Topics   Taking Medication - Assessment  Needs education   Problem Solving - Assessment  Needs education   Reducing Risk - Assessment  Needs education   Monitoring - Assessment  Needs education   DM Goals   Taking Medication - Goal  Today   Problem Solving - Goal  Today   Reducing Risk - Goal  Today   Monitoring - Goal  Today            Most Recent Value   DM Education Needs   Meter  Has own   Meter Type  Contour   Frequency of Testing  4 times a day   Medication  Insulin, Administration, Vial, Pen, Side effects, Actions   Problem Solving  Hypoglycemia, Hyperglycemia, Signs, Symptoms, Treatment   Physical Activity  -- [Pt will work outside in yard for long periods of time. Discussed using " temporary basal rate during these times to prevent low bs. ]   Motivation  Engaged, Strong   Teaching Method  Discussion, Demonstration, Handouts, Teach back   Patient Response  Verbalized understanding, Demonstrates adequately [Wife at bedside during discussion and demonstration]            Other Comments:  Patient and wife at bedside with new Medtronic insulin pump. Pump settings put into new pump are:  Basal rates:  12am-3am = 0.650u/hr  3am-7am = 0.750u/hr  7am-2pm = 1.35u/hr  2pm-5pm = 1.60u/hr  5pm-8pm = 1.85u/hr  8pm-12am = 1.40u/hr  Carb ratios:  12am-7am 1:8grams carb  7am-8am 1:6grams carb  8am-12am 1:8grams carb  Sensitivity Factor/Correction Factor:   12am-12am 1:50  Blood glucose targets:  12am-12am 100-120  Active insulin time/IOB:  3 hours  Patient given handouts with discussion on types of insulin, storage of insulin, disposal of needles, injection sites, rotation of sites, how to use an insulin pen, and how to use vials and syringes. Patient did return demonstration of drawing back on syringe, inserting air into the vial, drawing insulin into the syringe, and administering the insulin into the injection pad.  Also patient did return demonstration of applying the pen needle to the pen, priming the pen, and holding the pen for 10 seconds after administration. Patient to give self insulin tonight and restart insulin pump in the morning.  Patient has no questions or further needs at this time.        Electronically signed by:  Josiane Diamond RN  10/04/19 4:07 PM

## 2019-10-04 NOTE — PLAN OF CARE
Problem: Hyperglycemia, Persistent (Adult)  Goal: Signs and Symptoms of Listed Potential Problems Will be Absent, Minimized or Managed (Hyperglycemia, Persistent)  Outcome: Ongoing (interventions implemented as appropriate)

## 2019-10-04 NOTE — PLAN OF CARE
Problem: Patient Care Overview  Goal: Individualization and Mutuality  Outcome: Ongoing (interventions implemented as appropriate)   10/03/19 2016   Mutuality/Individual Preferences   How Would You and/or Your Support Person Like to Participate in Your Care? .     Goal: Discharge Needs Assessment  Outcome: Ongoing (interventions implemented as appropriate)   10/03/19 2016   Discharge Needs Assessment   Concerns to be Addressed adjustment to diagnosis/illness   Patient/Family Anticipates Transition to home with family   Patient/Family Anticipated Services at Transition education services   Transportation Concerns car, none   Transportation Anticipated family or friend will provide   Anticipated Changes Related to Illness none   Equipment Needed After Discharge glucometer   Current Discharge Risk chronically ill   Disability   Equipment Currently Used at Home none     Goal: Interprofessional Rounds/Family Conf  Outcome: Ongoing (interventions implemented as appropriate)   10/03/19 2016   Interdisciplinary Rounds/Family Conf   Participants ;pharmacy;nursing;social work/services       Problem: Hyperglycemia, Persistent (Adult)  Goal: Signs and Symptoms of Listed Potential Problems Will be Absent, Minimized or Managed (Hyperglycemia, Persistent)  Outcome: Ongoing (interventions implemented as appropriate)   10/03/19 2016   Goal/Outcome Evaluation   Problems Assessed (Hyperglycemia) hyperglycemia;hypoglycemia   Problems Present (Hyperglycemia) hyperglycemia;hypoglycemia

## 2019-10-04 NOTE — PROGRESS NOTES
"                                                                                          [facility] Follow up Note        PATIENT IDENTIFICATION     Name: Richard Garcia  Age: 69 y.o.  Sex: male  :  1950  MRN: [id number]       CHIEF COMPLIANTS / REASON FOR FOLLOW UP                Subjective:             Review of Systems:        Feeling  A lot better no new complaint waiting for a new pump       OBJECTIVE                                                                        Exam:  /55 (BP Location: Left arm, Patient Position: Lying)   Pulse 63   Temp 98.2 °F (36.8 °C) (Oral)   Resp 12   Ht 162.6 cm (64\")   Wt 73.5 kg (162 lb 0.6 oz)   SpO2 92%   BMI 27.81 kg/m²   Intake/Output last 3 shifts:  I/O last 3 completed shifts:  In: 720 [P.O.:720]  Out: 300 [Urine:300]  Intake/Output this shift:  No intake/output data recorded.    General Appearance:  Alert, cooperative, no distress, appears stated age  Head:  Normocephalic, without obvious abnormality, atraumatic  Eyes:  Sclerae anicteric, EOM's intact     Neck:  Supple,  no adenopathy;      Lungs:   Clear to auscultation bilaterally, respirations unlabored  Heart:  Regular rate and rhythm, S1 and S2 normal, no  rub   or gallop  Abdomen:  Soft, non-tender,    no masses, no hepatomegaly, no splenomegaly  Extremities:  Extremities normal, no edema  Neurologic:   Alert and oriented, no focal deficits    Scheduled Meds:  insulin glargine 20 Units Subcutaneous QAM   insulin lispro 0-7 Units Subcutaneous 4x Daily With Meals & Nightly   insulin lispro 6 Units Subcutaneous TID With Meals   lisinopril 20 mg Oral Daily   pantoprazole 40 mg Oral Q AM   sodium chloride 10 mL Intravenous Q12H     Continuous Infusions:  insulin regular infusion 1 unit/mL 0.1 Units/kg/hr Last Rate: Stopped (10/03/19 0200)   insulin 1-20 Units/hr Last Rate: 4 Units/hr (10/03/19 0605)   sodium chloride 125 mL/hr Last Rate: 125 mL/hr (10/03/19 0620)     PRN " Meds:•  acetaminophen  •  dextrose  •  dextrose  •  dextrose  •  glucagon (human recombinant)  •  nitroglycerin  •  ondansetron **OR** ondansetron  •  sodium chloride  •  sodium chloride         Data Review:                                                                           Lab Results (last 24 hours)     Procedure Component Value Units Date/Time    POC Glucose Once [[id number]]  (Normal) Collected:  10/04/19 0520    Specimen:  Blood Updated:  10/04/19 0522     Glucose 100 mg/dL      Comment: Serial Number: [id number]:  [id number]       Comprehensive Metabolic Panel [[id number]]  (Abnormal) Collected:  10/04/19 0256    Specimen:  Blood Updated:  10/04/19 0436     Glucose 63 mg/dL      BUN 20 mg/dL      Creatinine 1.00 mg/dL      Sodium 141 mmol/L      Potassium 3.6 mmol/L      Chloride 105 mmol/L      CO2 29.0 mmol/L      Calcium 8.3 mg/dL      Total Protein 5.5 g/dL      Albumin 3.30 g/dL      ALT (SGPT) 21 U/L      AST (SGOT) 19 U/L      Alkaline Phosphatase 42 U/L      Total Bilirubin 1.5 mg/dL      eGFR Non African Amer 74 mL/min/1.73      Globulin 2.2 gm/dL      A/G Ratio 1.5 g/dL      BUN/Creatinine Ratio 20.0     Anion Gap 10.6 mmol/L     CBC & Differential [[id number]] Collected:  10/04/19 0256    Specimen:  Blood Updated:  10/04/19 0413    Narrative:       The following orders were created for panel order CBC & Differential.  Procedure                               Abnormality         Status                     ---------                               -----------         ------                     CBC Auto Differential[[id number]]        Abnormal            Final result                 Please view results for these tests on the individual orders.    CBC Auto Differential [[id number]]  (Abnormal) Collected:  10/04/19 0256    Specimen:  Blood Updated:  10/04/19 0413     WBC 7.70 10*3/mm3      RBC 4.09 10*6/mm3      Hemoglobin 12.4 g/dL      Hematocrit 36.9 %      MCV 90.2 fL      MCH 30.3 pg      MCHC  33.6 g/dL      RDW 13.4 %      RDW-SD 42.0 fl      MPV 10.2 fL      Platelets 157 10*3/mm3      Neutrophil % 50.2 %      Lymphocyte % 40.5 %      Monocyte % 5.6 %      Eosinophil % 3.0 %      Basophil % 0.7 %      Neutrophils, Absolute 3.90 10*3/mm3      Lymphocytes, Absolute 3.10 10*3/mm3      Monocytes, Absolute 0.40 10*3/mm3      Eosinophils, Absolute 0.20 10*3/mm3      Basophils, Absolute 0.10 10*3/mm3      nRBC 0.0 /100 WBC     POC Glucose Once [[id number]]  (Abnormal) Collected:  10/03/19 2016    Specimen:  Blood Updated:  10/03/19 2017     Glucose 280 mg/dL      Comment: Serial Number: [id number]:  [id number]       POC Glucose Once [[id number]]  (Abnormal) Collected:  10/03/19 1624    Specimen:  Blood Updated:  10/03/19 1628     Glucose 174 mg/dL      Comment: Serial Number: [id number]:  [id number]       POC Glucose Once [[id number]]  (Normal) Collected:  10/03/19 1359    Specimen:  Blood Updated:  10/03/19 1400     Glucose 88 mg/dL      Comment: Serial Number: [id number]:  [id number]       POC Glucose Once [[id number]]  (Abnormal) Collected:  10/03/19 1344    Specimen:  Blood Updated:  10/03/19 1349     Glucose 56 mg/dL      Comment: Serial Number: [id number]:  [id number]       POC Glucose Once [[id number]]  (Abnormal) Collected:  10/03/19 1158    Specimen:  Blood Updated:  10/03/19 1208     Glucose 108 mg/dL      Comment: Serial Number: [id number]:  [id number]       POC Glucose Once [[id number]]  (Abnormal) Collected:  10/03/19 0910    Specimen:  Blood Updated:  10/03/19 0911     Glucose 114 mg/dL      Comment: Serial Number: [id number]:  [id number]       POC Glucose Once [[id number]]  (Abnormal) Collected:  10/03/19 0823    Specimen:  Blood Updated:  10/03/19 0824     Glucose 112 mg/dL      Comment: Serial Number: [id number]:  [id number]                Imaging:                                                                             Imaging Results (last 24 hours)     **  No results found for the last 24 hours. **                 ASSESSMENT:                                                                                Hyperglycemia       · Some diabetic ketoacidosis  · Hyperglycemia  · Hyperkalemia secondary to hyperglycemia and metabolic acidosis  · Acute kidney injury because of the volume depletion     Plan:      · Patient may have slight chronic kidney disease because of multiple risk factors some nephrosclerosis because of this.  But no significant proteinuria  · Renal functions are going back to baseline  · Acidosis and electrolytes have stabilized   · hemodynamically stable  · Blood sugar getting better  · No nausea vomiting  · Okay to be discharged when okay with primary           Ludwin Abbasi MD  [facility]  10/4/2019  7:15 AM

## 2019-10-05 ENCOUNTER — READMISSION MANAGEMENT (OUTPATIENT)
Dept: CALL CENTER | Facility: HOSPITAL | Age: 69
End: 2019-10-05

## 2019-10-05 NOTE — OUTREACH NOTE
Prep Survey      Responses   Facility patient discharged from?  [facility]   Is patient eligible?  Yes   Discharge diagnosis   hyperglycemia   Does the patient have one of the following disease processes/diagnoses(primary or secondary)?  Other   Does the patient have Home health ordered?  No   Is there a DME ordered?  No   Prep survey completed?  Yes          Maya Arzate RN

## 2019-10-07 ENCOUNTER — TRANSITIONAL CARE MANAGEMENT TELEPHONE ENCOUNTER (OUTPATIENT)
Dept: FAMILY MEDICINE CLINIC | Facility: CLINIC | Age: 69
End: 2019-10-07

## 2019-10-07 NOTE — OUTREACH NOTE
TONY call completed. Please see flow sheet for additional details.  Spoke w/ Henna per SREE. She reports pt doing very well - he's out now getting blood work & picking up [facility] - he took his glucose monitor w/ him so she doesn't know any readings, but she reports he's doing very well and is asymptomatic. Denies questions/complaints/immediate needs.  She confirms TONY 10/14/19.

## 2019-10-08 ENCOUNTER — READMISSION MANAGEMENT (OUTPATIENT)
Dept: CALL CENTER | Facility: HOSPITAL | Age: 69
End: 2019-10-08

## 2019-10-08 NOTE — OUTREACH NOTE
Medical Week 1 Survey      Responses   Facility patient discharged from?  [facility]   Does the patient have one of the following disease processes/diagnoses(primary or secondary)?  Other   Is there a successful TCM telephone encounter documented?  Yes          Mariana Terrazas LPN

## 2019-10-11 ENCOUNTER — READMISSION MANAGEMENT (OUTPATIENT)
Dept: CALL CENTER | Facility: HOSPITAL | Age: 69
End: 2019-10-11

## 2019-10-11 LAB
ATMOSPHERIC PRESS: ABNORMAL MM[HG]
BASE EXCESS BLDV CALC-SCNC: -1.1 MMOL/L
BDY SITE: ABNORMAL
CO2 BLDA-SCNC: 26.1 MMOL/L (ref 22–29)
HCO3 BLDV-SCNC: 24.7 MMOL/L
HOROWITZ INDEX BLD+IHG-RTO: 21 %
PCO2 BLDV: 44.6 MM HG (ref 42–51)
PH BLDV: 7.35 PH UNITS (ref 7.32–7.43)
PO2 BLDV: 34.3 MM HG
SAO2 % BLDCOV: 62.7 %

## 2019-10-11 NOTE — OUTREACH NOTE
Medical Week 2 Survey      Responses   Facility patient discharged from?  [facility]   Does the patient have one of the following disease processes/diagnoses(primary or secondary)?  Other   Week 2 attempt successful?  No   Rescheduled  Revoked   Revoke  Decline to participate [NO ANSWER, LEFT VM]          Mariana Terrazas LPN

## 2019-10-14 ENCOUNTER — OFFICE VISIT (OUTPATIENT)
Dept: FAMILY MEDICINE CLINIC | Facility: CLINIC | Age: 69
End: 2019-10-14

## 2019-10-14 VITALS
WEIGHT: 162 LBS | DIASTOLIC BLOOD PRESSURE: 68 MMHG | SYSTOLIC BLOOD PRESSURE: 132 MMHG | RESPIRATION RATE: 16 BRPM | OXYGEN SATURATION: 95 % | HEIGHT: 64 IN | TEMPERATURE: 98.6 F | HEART RATE: 86 BPM | BODY MASS INDEX: 27.66 KG/M2

## 2019-10-14 DIAGNOSIS — E10.65 TYPE 1 DIABETES MELLITUS WITH HYPERGLYCEMIA (HCC): Primary | ICD-10-CM

## 2019-10-14 PROCEDURE — 99495 TRANSJ CARE MGMT MOD F2F 14D: CPT | Performed by: FAMILY MEDICINE

## 2019-10-14 NOTE — PROGRESS NOTES
Topher Garcia is a 69 y.o. male.     Following up from [facility] stay on 10/2/19-10/4/19 due to hyperglycemia. Had new insulin pump placed.       Diabetes   He presents for his follow-up diabetic visit. He has type 1 diabetes mellitus. There are no diabetic associated symptoms. Pertinent negatives for diabetes include no chest pain, no fatigue, no polydipsia, no polyuria and no weakness. Current diabetic treatment includes insulin pump. He is compliant with treatment all of the time. His weight is stable. He is following a diabetic diet. Meal planning includes avoidance of concentrated sweets. He has not had a previous visit with a dietitian. His breakfast blood glucose is taken between 8-9 am. His breakfast blood glucose range is generally >200 mg/dl. His lunch blood glucose is taken between 12-1 pm. His lunch blood glucose range is generally 140-180 mg/dl. His dinner blood glucose is taken between 5-6 pm. His dinner blood glucose range is generally 110-130 mg/dl. His bedtime blood glucose is taken between 10-11 pm. His bedtime blood glucose range is generally >200 mg/dl. An ACE inhibitor/angiotensin II receptor blocker is being taken. He sees a podiatrist.Eye exam is current.        The following portions of the patient's history were reviewed and updated as appropriate: allergies, current medications, past family history, past medical history, past social history, past surgical history and problem list.    Patient Active Problem List   Diagnosis   • Dyslipidemia   • Erectile dysfunction   • Fatigue   • GERD (gastroesophageal reflux disease)   • Hepatitis C   • Neuropathy   • Nocturia   • ABY (obstructive sleep apnea)   • Other specified disorders of Eustachian tube, unspecified ear   • Polyosteoarthritis   • Pulmonary nodule   • Type 1 diabetes mellitus (CMS/HCC)   • Vitamin D deficiency   • Hyperglycemia       Current Outpatient Medications on File Prior to Visit   Medication Sig Dispense Refill   •  cholecalciferol (VITAMIN D3) 400 units tablet Take 400 Units by mouth Daily.     • Cyanocobalamin (VITAMIN B-12 PO) Take 1 tablet by mouth Daily.     • insulin aspart (NOVOLOG) 100 UNIT/ML injection Inject  under the skin into the appropriate area as directed Daily. Pump about 45-50 U per day     • lisinopril (PRINIVIL,ZESTRIL) 20 MG tablet Take 20 mg by mouth Daily.     • omeprazole (priLOSEC) 20 MG capsule Take 1 capsule by mouth Daily.       No current facility-administered medications on file prior to visit.        No Known Allergies    Review of Systems   Constitutional: Negative for activity change, appetite change, fatigue and fever.   HENT: Negative for ear pain, swollen glands and voice change.    Eyes: Negative for visual disturbance.   Respiratory: Negative for shortness of breath and wheezing.    Cardiovascular: Negative for chest pain and leg swelling.   Gastrointestinal: Negative for abdominal pain, blood in stool, constipation, diarrhea, nausea and vomiting.   Endocrine: Negative for polydipsia and polyuria.   Genitourinary: Negative for dysuria, frequency and hematuria.   Musculoskeletal: Negative for joint swelling, neck pain and neck stiffness.   Skin: Negative for rash and bruise.   Neurological: Negative for weakness, numbness and headache.   Psychiatric/Behavioral: Negative for suicidal ideas and depressed mood.       Objective   Physical Exam   Constitutional: He is oriented to person, place, and time. He appears well-developed and well-nourished.   Eyes: Conjunctivae and EOM are normal. Pupils are equal, round, and reactive to light.   Neck: Normal range of motion. Neck supple.   Cardiovascular: Normal rate, regular rhythm and normal heart sounds.   Pulmonary/Chest: Effort normal and breath sounds normal.   Abdominal: Soft. Bowel sounds are normal.   Musculoskeletal: Normal range of motion.   Neurological: He is alert and oriented to person, place, and time.   Skin: Skin is warm and dry.    Psychiatric: He has a normal mood and affect. His behavior is normal. Judgment and thought content normal.         Assessment/Plan .  Problem List Items Addressed This Visit     Type 1 diabetes mellitus (CMS/HCC) - Primary    Overview     Dr Lucas         Current Assessment & Plan     New pump in place. Doing well. Keep follow up with Endo             Follow-up for routine health maintenance as directed

## 2019-10-16 LAB — GLUCOSE BLDC GLUCOMTR-MCNC: 556 MG/DL (ref 74–100)

## 2019-10-22 ENCOUNTER — TELEPHONE (OUTPATIENT)
Dept: DIABETES SERVICES | Facility: HOSPITAL | Age: 69
End: 2019-10-22

## 2019-10-22 NOTE — TELEPHONE ENCOUNTER
Pt states he remains on his insulin infusion pump and has been working with the [facility] nurse educators with the insulin pump setting adjustments. He states bs has been running above 150 mg/dl and has been in the low 200s some. He will continue to report bs to the [facility] educator. Pt checking bs as recommended by MD. Rodrigue with no questions for educator at this time. He has all necessary insulin pump supplies at home.

## 2020-01-13 ENCOUNTER — OFFICE VISIT (OUTPATIENT)
Dept: FAMILY MEDICINE CLINIC | Facility: CLINIC | Age: 70
End: 2020-01-13

## 2020-01-13 VITALS
DIASTOLIC BLOOD PRESSURE: 80 MMHG | BODY MASS INDEX: 28 KG/M2 | SYSTOLIC BLOOD PRESSURE: 132 MMHG | HEART RATE: 87 BPM | OXYGEN SATURATION: 97 % | TEMPERATURE: 98.5 F | RESPIRATION RATE: 18 BRPM | HEIGHT: 64 IN | WEIGHT: 164 LBS

## 2020-01-13 DIAGNOSIS — R05.9 COUGH: ICD-10-CM

## 2020-01-13 DIAGNOSIS — J01.00 ACUTE NON-RECURRENT MAXILLARY SINUSITIS: Primary | ICD-10-CM

## 2020-01-13 PROCEDURE — 99213 OFFICE O/P EST LOW 20 MIN: CPT | Performed by: FAMILY MEDICINE

## 2020-01-13 RX ORDER — BENZONATATE 100 MG/1
100 CAPSULE ORAL 3 TIMES DAILY PRN
Qty: 30 CAPSULE | Refills: 0 | Status: SHIPPED | OUTPATIENT
Start: 2020-01-13 | End: 2021-10-26

## 2020-01-13 RX ORDER — AMOXICILLIN 500 MG/1
500 TABLET, FILM COATED ORAL 3 TIMES DAILY
Qty: 30 TABLET | Refills: 0 | Status: SHIPPED | OUTPATIENT
Start: 2020-01-13 | End: 2020-01-23

## 2020-01-13 NOTE — PROGRESS NOTES
Subjective   Richard Garcia is a 69 y.o. male.     Cough   The current episode started in the past 7 days (Friday evening). The problem occurs every few minutes. The cough is productive of sputum. Associated symptoms include nasal congestion, postnasal drip and a sore throat. Pertinent negatives include no chest pain, ear pain, fever, rash, shortness of breath or wheezing.        The following portions of the patient's history were reviewed and updated as appropriate: allergies, current medications, past family history, past medical history, past social history, past surgical history and problem list.    Patient Active Problem List   Diagnosis   • Dyslipidemia   • Erectile dysfunction   • Fatigue   • GERD (gastroesophageal reflux disease)   • Hepatitis C   • Neuropathy   • Nocturia   • ABY (obstructive sleep apnea)   • Other specified disorders of Eustachian tube, unspecified ear   • Polyosteoarthritis   • Pulmonary nodule   • Type 1 diabetes mellitus (CMS/HCC)   • Vitamin D deficiency   • Hyperglycemia       Current Outpatient Medications on File Prior to Visit   Medication Sig Dispense Refill   • cholecalciferol (VITAMIN D3) 400 units tablet Take 400 Units by mouth Daily.     • Cyanocobalamin (VITAMIN B-12 PO) Take 1 tablet by mouth Daily.     • insulin aspart (NOVOLOG) 100 UNIT/ML injection Inject  under the skin into the appropriate area as directed Daily. Pump about 45-50 U per day     • lisinopril (PRINIVIL,ZESTRIL) 20 MG tablet Take 20 mg by mouth Daily.     • omeprazole (priLOSEC) 20 MG capsule Take 1 capsule by mouth Daily.       No current facility-administered medications on file prior to visit.        No Known Allergies    Review of Systems   Constitutional: Negative for activity change, appetite change, fatigue and fever.   HENT: Positive for postnasal drip and sore throat. Negative for ear pain, swollen glands and voice change.    Eyes: Negative for visual disturbance.   Respiratory: Positive for  cough. Negative for shortness of breath and wheezing.    Cardiovascular: Negative for chest pain and leg swelling.   Gastrointestinal: Negative for abdominal pain, blood in stool, constipation, diarrhea, nausea and vomiting.   Endocrine: Negative for polydipsia and polyuria.   Genitourinary: Negative for dysuria, frequency and hematuria.   Musculoskeletal: Negative for joint swelling, neck pain and neck stiffness.   Skin: Negative for rash and bruise.   Neurological: Negative for weakness, numbness and headache.   Psychiatric/Behavioral: Negative for suicidal ideas and depressed mood.     I have reviewed and confirmed the accuracy of the ROS as documented by the MA/LPN/RN Cesar Mcleod MD      Objective   Vitals:    01/13/20 1534   BP: 132/80   Pulse: 87   Resp: 18   Temp: 98.5 °F (36.9 °C)   SpO2: 97%     Physical Exam   Constitutional: He is oriented to person, place, and time. He appears well-developed and well-nourished.   Eyes: Pupils are equal, round, and reactive to light. Conjunctivae and EOM are normal.   Neck: Normal range of motion. Neck supple.   Cardiovascular: Normal rate, regular rhythm and normal heart sounds.   Pulmonary/Chest: Effort normal and breath sounds normal.   Abdominal: Soft. Bowel sounds are normal.   Musculoskeletal: Normal range of motion.   Neurological: He is alert and oriented to person, place, and time.   Skin: Skin is warm and dry.   Psychiatric: He has a normal mood and affect. His behavior is normal. Judgment and thought content normal.         Assessment/Plan .  Problem List Items Addressed This Visit     None      Visit Diagnoses     Acute non-recurrent maxillary sinusitis    -  Primary    Relevant Medications    amoxicillin (AMOXIL) 500 MG tablet    Cough        Relevant Medications    benzonatate (TESSALON) 100 MG capsule      Findings discussed. All questions answered.  Medication and medication adverse effects discussed.  Drug education given and explained to  patient. Patient verbalized understanding.  Follow-up in 2 weeks if not better.  Follow-up sooner for worsening symptoms or for any concerns.

## 2020-01-17 NOTE — DISCHARGE SUMMARY
Date of Admission: 10/2/2019    Date of Discharge:  10/4/2019    Length of stay:  LOS: 0 days     Discharge Diagnosis: hyperglycemia    Presenting Problem/History of Present Illness  Active Hospital Problems    Diagnosis  POA   • **Hyperglycemia [R73.9]  Yes      Resolved Hospital Problems   No resolved problems to display.          Hospital Course  Patient is a 69 y.o. male presented with hyperglycemia due to insulin pump failure. He received a new insulin pump and was instructed on its use by the [facility] Nurse Educator before discharge      Past Medical History:     Past Medical History:   Diagnosis Date   • Diabetes mellitus type I, controlled (CMS/[facility])    • Dyslipidemia    • Erectile dysfunction     Impression: viagra causes headache. Try cilais   • Fatigue     Impression: likely multifactorial,Findings discussed. All questions answered. Differential diagnosis discussed. Follow-up after testing complete, sooner for worsening symptoms or any concerns. Recommend sleep study. Wife says pt snores significantly.   • GERD (gastroesophageal reflux disease)    • History of chicken pox    • History of hepatitis C     Impression: s/p tx with harvoni   • History of tobacco use    • Lipoma of flank     Impression: Findings discussed. All questions answered. Reassurance, education. Natural course and self-limited nature of this condition discussed.   • Medicare annual wellness visit, subsequent     with abnormal findings   • Neck pain     Impression: left sided.   • Nocturia    • ABY (obstructive sleep apnea)     Impression: per sleep study. Pt recently bought new mattress, wants to see how he does with that.   • Overweight (BMI 25.0-29.9)    • Screening for alcoholism    • Screening for depression    • Vitamin D deficiency        Past Surgical History:   History reviewed. No pertinent surgical history.    Social History:   Social History     Socioeconomic History   • Marital status:      Spouse name: Not on file   •  Number of children: Not on file   • Years of education: Not on file   • Highest education level: Not on file   Tobacco Use   • Smoking status: Former Smoker     Last attempt to quit: 2011     Years since quittin.6   Substance and Sexual Activity   • Alcohol use: No     Frequency: Never   • Drug use: No       Procedures Performed         Consults:   Consults     Date and Time Order Name Status Description    10/3/2019 0013 Inpatient Endocrinology Consult      10/2/2019 2031 Hospitalist (on-call MD unless specified) Completed           Pertinent Test Results:       Lab Results (most recent)     Procedure Component Value Units Date/Time    POC Glucose Once [[id number]]  (Abnormal) Collected:  10/04/19 1131    Specimen:  Blood Updated:  10/04/19 1135     Glucose 224 mg/dL      Comment: Serial Number: [id number]:  [id number]       POC Glucose Once [[id number]]  (Abnormal) Collected:  10/04/19 0726    Specimen:  Blood Updated:  10/04/19 0729     Glucose 165 mg/dL      Comment: Serial Number: [id number]:  [id number]       Comprehensive Metabolic Panel [[id number]]  (Abnormal) Collected:  10/04/19 0256    Specimen:  Blood Updated:  10/04/19 0436     Glucose 63 mg/dL      BUN 20 mg/dL      Creatinine 1.00 mg/dL      Sodium 141 mmol/L      Potassium 3.6 mmol/L      Chloride 105 mmol/L      CO2 29.0 mmol/L      Calcium 8.3 mg/dL      Total Protein 5.5 g/dL      Albumin 3.30 g/dL      ALT (SGPT) 21 U/L      AST (SGOT) 19 U/L      Alkaline Phosphatase 42 U/L      Total Bilirubin 1.5 mg/dL      eGFR Non African Amer 74 mL/min/1.73      Globulin 2.2 gm/dL      A/G Ratio 1.5 g/dL      BUN/Creatinine Ratio 20.0     Anion Gap 10.6 mmol/L     CBC & Differential [[id number]] Collected:  10/04/19 0256    Specimen:  Blood Updated:  10/04/19 0413    Narrative:       The following orders were created for panel order CBC & Differential.  Procedure                               Abnormality         Status                      ---------                               -----------         ------                     CBC Auto Differential[[id number]]        Abnormal            Final result                 Please view results for these tests on the individual orders.    CBC Auto Differential [[id number]]  (Abnormal) Collected:  10/04/19 0256    Specimen:  Blood Updated:  10/04/19 0413     WBC 7.70 10*3/mm3      RBC 4.09 10*6/mm3      Hemoglobin 12.4 g/dL      Hematocrit 36.9 %      MCV 90.2 fL      MCH 30.3 pg      MCHC 33.6 g/dL      RDW 13.4 %      RDW-SD 42.0 fl      MPV 10.2 fL      Platelets 157 10*3/mm3      Neutrophil % 50.2 %      Lymphocyte % 40.5 %      Monocyte % 5.6 %      Eosinophil % 3.0 %      Basophil % 0.7 %      Neutrophils, Absolute 3.90 10*3/mm3      Lymphocytes, Absolute 3.10 10*3/mm3      Monocytes, Absolute 0.40 10*3/mm3      Eosinophils, Absolute 0.20 10*3/mm3      Basophils, Absolute 0.10 10*3/mm3      nRBC 0.0 /100 WBC     Basic Metabolic Panel [[id number]]  (Abnormal) Collected:  10/03/19 0300    Specimen:  Blood Updated:  10/03/19 0353     Glucose 96 mg/dL      BUN 24 mg/dL      Creatinine 1.20 mg/dL      Sodium 142 mmol/L      Potassium 4.0 mmol/L      Chloride 106 mmol/L      CO2 28.0 mmol/L      Calcium 8.9 mg/dL      eGFR Non African Amer 60 mL/min/1.73      BUN/Creatinine Ratio 20.0     Anion Gap 12.0 mmol/L     CBC Auto Differential [[id number]]  (Abnormal) Collected:  10/03/19 0300    Specimen:  Blood Updated:  10/03/19 0321     WBC 9.60 10*3/mm3      RBC 4.33 10*6/mm3      Hemoglobin 13.1 g/dL      Hematocrit 39.1 %      MCV 90.3 fL      MCH 30.4 pg      MCHC 33.6 g/dL      RDW 13.4 %      RDW-SD 42.0 fl      MPV 9.7 fL      Platelets 179 10*3/mm3      Neutrophil % 54.8 %      Lymphocyte % 30.5 %      Monocyte % 12.2 %      Eosinophil % 1.6 %      Basophil % 0.9 %      Neutrophils, Absolute 5.20 10*3/mm3      Lymphocytes, Absolute 2.90 10*3/mm3      Monocytes, Absolute 1.20 10*3/mm3      Eosinophils, Absolute  0.20 10*3/mm3      Basophils, Absolute 0.10 10*3/mm3      nRBC 0.1 /100 WBC     Urinalysis With Culture If Indicated - Urine, Clean Catch [[id number]]  (Abnormal) Collected:  10/02/19 2140    Specimen:  Urine, Clean Catch Updated:  10/02/19 2204     Color, UA Yellow     Appearance, UA Clear     pH, UA 5.5     Specific Gravity, UA 1.027     Glucose, UA >=1000 mg/dL (3+)     Ketones, UA Trace     Bilirubin, UA Negative     Blood, UA Negative     Protein, UA Negative     Leuk Esterase, UA Negative     Nitrite, UA Negative     Urobilinogen, UA 0.2 E.U./dL    Narrative:       Urine microscopic not indicated.    Blood Gas, Venous [[id number]] Collected:  10/02/19 2028    Specimen:  Venous Blood Updated:  10/02/19 2032    Comprehensive Metabolic Panel [[id number]]  (Abnormal) Collected:  10/02/19 1852    Specimen:  Blood Updated:  10/02/19 2000     Glucose 825 mg/dL      BUN 32 mg/dL      Creatinine 1.60 mg/dL      Sodium 130 mmol/L      Potassium 5.3 mmol/L      Chloride 95 mmol/L      CO2 19.0 mmol/L      Calcium 9.1 mg/dL      Total Protein 6.7 g/dL      Albumin 4.10 g/dL      ALT (SGPT) 33 U/L      AST (SGOT) 29 U/L      Alkaline Phosphatase 59 U/L      Total Bilirubin 1.9 mg/dL      eGFR Non African Amer 43 mL/min/1.73      Globulin 2.6 gm/dL      A/G Ratio 1.6 g/dL      BUN/Creatinine Ratio 20.0     Anion Gap 21.3 mmol/L     Extra Tubes [[id number]] Collected:  10/02/19 1852    Specimen:  Blood, Venous Line Updated:  10/02/19 2000    Narrative:       The following orders were created for panel order Extra Tubes.  Procedure                               Abnormality         Status                     ---------                               -----------         ------                     Gold Top - [facility][[id number]]                                   Final result                 Please view results for these tests on the individual orders.    Gold Top - [facility] [[id number]] Collected:  10/02/19 1852    Specimen:  Blood Updated:   10/02/19 2000     Extra Tube Hold for add-ons.     Comment: Auto resulted.       [place] Draw [[id number]] Collected:  10/02/19 1852    Specimen:  Blood Updated:  10/02/19 2000    Narrative:       The following orders were created for panel order [place] Draw.  Procedure                               Abnormality         Status                     ---------                               -----------         ------                     Light Blue Top[[id number]]                                   Final result               Green Top (Gel)[[id number]]                                  Final result               Lavender Top[[id number]]                                     Final result               Gold Top - [facility]ST[[id number]]                                   Final result                 Please view results for these tests on the individual orders.    Light Blue Top [[id number]] Collected:  10/02/19 1852    Specimen:  Blood Updated:  10/02/19 2000     Extra Tube hold for add-on     Comment: Auto resulted       Green Top (Gel) [[id number]] Collected:  10/02/19 1852    Specimen:  Blood Updated:  10/02/19 2000     Extra Tube Hold for add-ons.     Comment: Auto resulted.       Lavender Top [[id number]] Collected:  10/02/19 1852    Specimen:  Blood Updated:  10/02/19 2000     Extra Tube hold for add-on     Comment: Auto resulted       Gold Top - [facility]ST [[id number]] Collected:  10/02/19 1852    Specimen:  Blood Updated:  10/02/19 2000     Extra Tube Hold for add-ons.     Comment: Auto resulted.       CBC & Differential [[id number]] Collected:  10/02/19 1852    Specimen:  Blood Updated:  10/02/19 1939    Narrative:       The following orders were created for panel order CBC & Differential.  Procedure                               Abnormality         Status                     ---------                               -----------         ------                     CBC Auto Differential[[id number]]        Abnormal            Final result                  Please view results for these tests on the individual orders.                  Imaging Results (most recent)     None          Condition on Discharge:  good    Vital Signs  Temp:  [97.7 °F (36.5 °C)-98.3 °F (36.8 °C)] 98.1 °F (36.7 °C)  Heart Rate:  [55-75] 75  Resp:  [12-71] 16  BP: (125-159)/(52-73) 147/65    Physical Exam:     General Appearance:    Alert, cooperative, in no acute distress   Lungs:     Clear to auscultation,respirations regular, even and                  unlabored    Heart:    Regular rhythm and normal rate, normal S1 and S2, no            murmur, no gallop, no rub, no click   Abdomen:     Normal bowel sounds, no masses, no organomegaly, soft        non-tender, non-distended, no guarding, no rebound                tenderness   Extremities:   Moves all extremities well, no edema, no cyanosis, no             redness       Discharge Disposition  home    Discharge Medications     Discharge Medications      Continue These Medications      Instructions Start Date   cholecalciferol 400 units tablet  Commonly known as:  VITAMIN D3   400 Units, Oral, Daily      lisinopril 20 MG tablet  Commonly known as:  PRINIVIL,ZESTRIL   20 mg, Oral, Daily      NOVOLOG 100 UNIT/ML injection  Generic drug:  insulin aspart   Subcutaneous, Daily, Pump about 45-50 U per day      omeprazole 20 MG capsule  Commonly known as:  priLOSEC   1 capsule, Oral, Daily      VITAMIN B-12 PO   1 tablet, Oral, Daily             Discharge Diet: consistent Carb      Activity at Discharge: as tolerated      Follow-up Appointments  No future appointments.      Test Results Pending at Discharge   Order Current Status    Blood Gas, Venous In process    POC Glucose Once In process           Risk for Readmission (LACE) Score: 5 (10/4/2019  6:00 AM)          Lc Lind Jr., MD  10/04/19  12:50 PM    Time:            Anesthesia Volume In Cc: 6

## 2020-09-15 ENCOUNTER — OFFICE VISIT (OUTPATIENT)
Dept: FAMILY MEDICINE CLINIC | Facility: CLINIC | Age: 70
End: 2020-09-15

## 2020-09-15 VITALS
BODY MASS INDEX: 28.38 KG/M2 | OXYGEN SATURATION: 97 % | WEIGHT: 166.2 LBS | HEIGHT: 64 IN | RESPIRATION RATE: 16 BRPM | HEART RATE: 79 BPM | TEMPERATURE: 98.9 F | SYSTOLIC BLOOD PRESSURE: 138 MMHG | DIASTOLIC BLOOD PRESSURE: 74 MMHG

## 2020-09-15 DIAGNOSIS — E10.65 TYPE 1 DIABETES MELLITUS WITH HYPERGLYCEMIA (HCC): ICD-10-CM

## 2020-09-15 DIAGNOSIS — I10 ESSENTIAL HYPERTENSION: ICD-10-CM

## 2020-09-15 DIAGNOSIS — Z00.00 MEDICARE ANNUAL WELLNESS VISIT, SUBSEQUENT: Primary | ICD-10-CM

## 2020-09-15 DIAGNOSIS — Z12.11 SCREEN FOR COLON CANCER: ICD-10-CM

## 2020-09-15 LAB
BILIRUB BLD-MCNC: NEGATIVE MG/DL
CLARITY, POC: CLEAR
COLOR UR: YELLOW
GLUCOSE UR STRIP-MCNC: NEGATIVE MG/DL
KETONES UR QL: NEGATIVE
LEUKOCYTE EST, POC: NEGATIVE
NITRITE UR-MCNC: NEGATIVE MG/ML
PH UR: 5 [PH] (ref 5–8)
PROT UR STRIP-MCNC: NEGATIVE MG/DL
RBC # UR STRIP: NEGATIVE /UL
SP GR UR: 1.01 (ref 1–1.03)
UROBILINOGEN UR QL: NORMAL

## 2020-09-15 PROCEDURE — G0439 PPPS, SUBSEQ VISIT: HCPCS | Performed by: FAMILY MEDICINE

## 2020-09-15 PROCEDURE — 81003 URINALYSIS AUTO W/O SCOPE: CPT | Performed by: FAMILY MEDICINE

## 2020-09-15 PROCEDURE — 99213 OFFICE O/P EST LOW 20 MIN: CPT | Performed by: FAMILY MEDICINE

## 2020-09-15 RX ORDER — ASPIRIN 81 MG/1
81 TABLET, CHEWABLE ORAL DAILY
COMMUNITY

## 2020-09-15 NOTE — PROGRESS NOTES
Subjective   Richard Garcia is a 70 y.o. male.     The patient is here: for coordination of medical care and annual wellness examination.   [facility] pt report   Pt had cologuard last year per his report     Hypertension  This is a chronic problem. The problem is unchanged. The problem is controlled. Pertinent negatives include no chest pain, neck pain or shortness of breath. Current antihypertension treatment includes ACE inhibitors.        The following portions of the patient's history were reviewed and updated as appropriate: allergies, current medications, past family history, past medical history, past social history, past surgical history and problem list.    Patient Active Problem List   Diagnosis   • Dyslipidemia   • Erectile dysfunction   • Fatigue   • GERD (gastroesophageal reflux disease)   • Hepatitis C   • Neuropathy   • Nocturia   • ABY (obstructive sleep apnea)   • Other specified disorders of Eustachian tube, unspecified ear   • Polyosteoarthritis   • Pulmonary nodule   • Type 1 diabetes mellitus (CMS/HCC)   • Vitamin D deficiency   • Hyperglycemia       Current Outpatient Medications on File Prior to Visit   Medication Sig Dispense Refill   • aspirin 81 MG chewable tablet Chew 81 mg Daily.     • cholecalciferol (VITAMIN D3) 400 units tablet Take 400 Units by mouth Daily.     • Cyanocobalamin (VITAMIN B-12 PO) Take 1 tablet by mouth Daily.     • insulin aspart (NOVOLOG) 100 UNIT/ML injection Inject  under the skin into the appropriate area as directed Daily. Pump about 45-50 U per day     • lisinopril (PRINIVIL,ZESTRIL) 20 MG tablet Take 20 mg by mouth Daily.     • omeprazole (priLOSEC) 20 MG capsule Take 1 capsule by mouth Daily.     • benzonatate (TESSALON) 100 MG capsule Take 1 capsule by mouth 3 (Three) Times a Day As Needed for Cough. 30 capsule 0     No current facility-administered medications on file prior to visit.      Current outpatient and discharge medications  have been reconciled for the patient.  Reviewed by: Cesar Mcleod MD      No Known Allergies    Review of Systems   Constitutional: Negative for activity change, appetite change, fatigue and fever.   HENT: Negative for ear pain, swollen glands and voice change.    Eyes: Negative for visual disturbance.   Respiratory: Negative for shortness of breath and wheezing.    Cardiovascular: Negative for chest pain and leg swelling.   Gastrointestinal: Negative for abdominal pain, blood in stool, constipation, diarrhea, nausea and vomiting.   Endocrine: Negative for polydipsia and polyuria.   Genitourinary: Negative for dysuria, frequency and hematuria.   Musculoskeletal: Negative for joint swelling, neck pain and neck stiffness.   Skin: Negative for rash and bruise.   Neurological: Negative for weakness, numbness and headache.   Psychiatric/Behavioral: Negative for suicidal ideas and depressed mood.     I have reviewed and confirmed the accuracy of the ROS as documented by the MA/LPN/RN Cesar Mcleod MD      Objective   Vitals:    09/15/20 1341   BP: 138/74   Pulse: 79   Resp: 16   Temp: 98.9 °F (37.2 °C)   SpO2: 97%     Physical Exam  Constitutional:       Appearance: He is well-developed.   HENT:      Head: Normocephalic and atraumatic.      Right Ear: External ear normal.      Left Ear: External ear normal.      Nose: Nose normal.   Eyes:      Pupils: Pupils are equal, round, and reactive to light.   Neck:      Musculoskeletal: Normal range of motion and neck supple.   Cardiovascular:      Rate and Rhythm: Normal rate and regular rhythm.      Heart sounds: Normal heart sounds.   Pulmonary:      Effort: Pulmonary effort is normal.      Breath sounds: Normal breath sounds.   Abdominal:      General: Bowel sounds are normal.      Palpations: Abdomen is soft.   Musculoskeletal: Normal range of motion.   Skin:     General: Skin is warm and dry.   Neurological:      Mental Status: He is alert and oriented to  person, place, and time.   Psychiatric:         Behavior: Behavior normal.         Thought Content: Thought content normal.         Judgment: Judgment normal.         I wore protective equipment throughout this patient encounter to include mask and eye protection. Hand hygiene was performed before donning protective equipment and after removal when leaving the room.    Assessment/Plan .  Richard was seen today for medicare wellness-subsequent.    Diagnoses and all orders for this visit:    Medicare annual wellness visit, subsequent  -     POC Urinalysis Dipstick, Multipro    Screen for colon cancer  -     Cologuard - Stool, Per Rectum; Future    Type 1 diabetes mellitus with hyperglycemia (CMS/[facility])  Comments:  A1C 7.5 in May per pt report     Essential hypertension  Comments:  stable, contine current management       Discussed anticipatory guidance, diet, exercise, and weight loss.  Discussed safety and routine screening examinations.  Discussed self-examinations.  Findings discussed. All questions answered.  Medication and medication adverse effects discussed.  Drug education given and explained to patient. Patient verbalized understanding.  Follow-up for routine health maintenance as directed   Follow up in 6 months for recheck, sooner for worsening symptoms or any concerns.

## 2020-09-15 NOTE — PATIENT INSTRUCTIONS
Medicare Wellness  Personal Prevention Plan of Service     Date of Office Visit:  09/15/2020  Encounter Provider:  Cesar Mcleod MD  Place of Service:  [facility]  Patient Name: Richard Garcia  :  1950    As part of the Medicare Wellness portion of your visit today, we are providing you with this personalized preventive plan of services (PPPS). This plan is based upon recommendations of the [place] Preventive Services Task Force (USPSTF) and the Advisory Committee on Immunization Practices (ACIP).    This lists the preventive care services that should be considered, and provides dates of when you are due. Items listed as completed are up-to-date and do not require any further intervention.    Health Maintenance   Topic Date Due   • URINE MICROALBUMIN  1950   • BH AMB Hepatitis B for at Risk Adult Patients (1 of 3 - Risk 3-dose series) 1968   • TDAP/TD VACCINES (1 - Tdap) 1968   • ZOSTER VACCINE (1 of 2) 1999   • Pneumococcal Vaccine Once at 65 Years Old  2014   • DIABETIC FOOT EXAM  2019   • HEMOGLOBIN A1C  2019   • COLONOSCOPY  2019   • AAA SCREEN (ONE-TIME)  2019   • MEDICARE ANNUAL WELLNESS  2020   • INFLUENZA VACCINE  2020   • DIABETIC EYE EXAM  2021   • HEPATITIS C SCREENING  Completed       Orders Placed This Encounter   Procedures   • POC Urinalysis Dipstick, Multipro       No follow-ups on file.          Fall Prevention in the Home, Adult  Falls can cause injuries. They can happen to people of all ages. There are many things you can do to make your home safe and to help prevent falls. Ask for help when making these changes, if needed.  What actions can I take to prevent falls?  General Instructions  · Use good lighting in all rooms. Replace any light bulbs that burn out.  · Turn on the lights when you go into a dark area. Use night-lights.  · Keep items that you use often in  easy-to-reach places. Lower the shelves around your home if necessary.  · Set up your furniture so you have a clear path. Avoid moving your furniture around.  · Do not have throw rugs and other things on the floor that can make you trip.  · Avoid walking on wet floors.  · If any of your floors are uneven, fix them.  · Add color or contrast paint or tape to clearly dustin and help you see:  ? Any grab bars or handrails.  ? First and last steps of stairways.  ? Where the edge of each step is.  · If you use a stepladder:  ? Make sure that it is fully opened. Do not climb a closed stepladder.  ? Make sure that both sides of the stepladder are locked into place.  ? Ask someone to hold the stepladder for you while you use it.  · If there are any pets around you, be aware of where they are.  What can I do in the bathroom?         · Keep the floor dry. Clean up any water that spills onto the floor as soon as it happens.  · Remove soap buildup in the tub or shower regularly.  · Use non-skid mats or decals on the floor of the tub or shower.  · Attach bath mats securely with double-sided, non-slip rug tape.  · If you need to sit down in the shower, use a plastic, non-slip stool.  · Install grab bars by the toilet and in the tub and shower. Do not use towel bars as grab bars.  What can I do in the bedroom?  · Make sure that you have a light by your bed that is easy to reach.  · Do not use any sheets or blankets that are too big for your bed. They should not hang down onto the floor.  · Have a firm chair that has side arms. You can use this for support while you get dressed.  What can I do in the kitchen?  · Clean up any spills right away.  · If you need to reach something above you, use a strong step stool that has a grab bar.  · Keep electrical cords out of the way.  · Do not use floor polish or wax that makes floors slippery. If you must use wax, use non-skid floor wax.  What can I do with my stairs?  · Do not leave any items  on the stairs.  · Make sure that you have a light switch at the top of the stairs and the bottom of the stairs. If you do not have them, ask someone to add them for you.  · Make sure that there are handrails on both sides of the stairs, and use them. Fix handrails that are broken or loose. Make sure that handrails are as long as the stairways.  · Install non-slip stair treads on all stairs in your home.  · Avoid having throw rugs at the top or bottom of the stairs. If you do have throw rugs, attach them to the floor with carpet tape.  · Choose a carpet that does not hide the edge of the steps on the stairway.  · Check any carpeting to make sure that it is firmly attached to the stairs. Fix any carpet that is loose or worn.  What can I do on the outside of my home?  · Use bright outdoor lighting.  · Regularly fix the edges of walkways and driveways and fix any cracks.  · Remove anything that might make you trip as you walk through a door, such as a raised step or threshold.  · Trim any bushes or trees on the path to your home.  · Regularly check to see if handrails are loose or broken. Make sure that both sides of any steps have handrails.  · Install guardrails along the edges of any raised decks and porches.  · Clear walking paths of anything that might make someone trip, such as tools or rocks.  · Have any leaves, snow, or ice cleared regularly.  · Use sand or salt on walking paths during winter.  · Clean up any spills in your garage right away. This includes grease or oil spills.  What other actions can I take?  · Wear shoes that:  ? Have a low heel. Do not wear high heels.  ? Have rubber bottoms.  ? Are comfortable and fit you well.  ? Are closed at the toe. Do not wear open-toe sandals.  · Use tools that help you move around (mobility aids) if they are needed. These include:  ? Canes.  ? Walkers.  ? Scooters.  ? Crutches.  · Review your medicines with your doctor. Some medicines can make you feel dizzy. This can  increase your chance of falling.  Ask your doctor what other things you can do to help prevent falls.  Where to find more information  · Centers for Disease Control and Prevention, STEAD[employer]: https://cdc.gov  · National [place] [employer] A[place]: https://ii5jzoj.leelee.nih.gov  Contact a doctor if:  · You are afraid of falling at home.  · You feel weak, drowsy, or dizzy at home.  · You fall at home.  Summary  · There are many simple things that you can do to make your home safe and to help prevent falls.  · Ways to make your home safe include removing tripping hazards and installing grab bars in the bathroom.  · Ask for help when making these changes in your home.  This information is not intended to replace advice given to you by your health care provider. Make sure you discuss any questions you have with your health care provider.  Document Released: 10/14/2010 Document Revised: 04/09/2020 Document Reviewed: 08/02/2018  [employer] Patient Education © 2020 [employer]      Sit-to-Stand Exercise    The sit-to-stand exercise (also known as the chair stand or chair rise exercise) strengthens your lower body and helps you maintain or improve your mobility and independence. The goal is to do the sit-to-stand exercise without using your hands. This will be easier as you become stronger. You should always talk with your health care provider before starting any exercise program, especially if you have had recent surgery.  Do the exercise exactly as told by your health care provider and adjust it as directed. It is normal to feel mild stretching, pulling, tightness, or discomfort as you do this exercise, but you should stop right away if you feel sudden pain or your pain gets worse. Do not begin doing this exercise until told by your health care provider.  What the sit-to-stand exercise does  The sit-to-stand exercise helps to strengthen the muscles in your thighs and the muscles in the center of your body that give you stability (core  muscles). This exercise is especially helpful if:  · You have had knee or hip surgery.  · You have trouble getting up from a chair, out of a car, or off the toilet.  How to do the sit-to-stand exercise  1. Sit toward the front edge of a sturdy chair without armrests. Your knees should be bent and your feet should be flat on the floor and shoulder-width apart.  2. Place your hands lightly on each side of the seat. Keep your back and neck as straight as possible, with your chest slightly forward.  3. Breathe in slowly. Lean forward and slightly shift your weight to the front of your feet.  4. Breathe out as you slowly stand up. Use your hands as little as possible.  5. Stand and pause for a full breath in and out.  6. Breathe in as you sit down slowly. Tighten your core and abdominal muscles to control your lowering as much as possible.  7. Breathe out slowly.  8. Do this exercise 10-15 times. If needed, do it fewer times until you build up strength.  9. Rest for 1 minute, then do another set of 10-15 repetitions.  To change the difficulty of the sit-to-stand exercise  · If the exercise is too difficult, use a chair with sturdy armrests, and push off the armrests to help you come to the standing position. You can also use the armrests to help slowly lower yourself back to sitting. As this gets easier, try to use your arms less. You can also place a firm cushion or pillow on the chair to make the surface higher.  · If this exercise is too easy, do not use your arms to help raise or lower yourself. You can also wear a weighted vest, use hand weights, increase your repetitions, or try a lower chair.  General tips  · You may feel tired when starting an exercise routine. This is normal.  · You may have muscle soreness that lasts a few days. This is normal. As you get stronger, you may not feel muscle soreness.  · Use smooth, steady movements.  · Do not  hold your breath during strength exercises. This can cause unsafe  changes in your blood pressure.  · Breathe in slowly through your nose, and breathe out slowly through your mouth.  Summary  · Strengthening your lower body is an important step to help you move safely and independently.  · The sit-to-stand exercise helps strengthen the muscles in your thighs and core.  · You should always talk with your health care provider before starting any exercise program, especially if you have had recent surgery.  This information is not intended to replace advice given to you by your health care provider. Make sure you discuss any questions you have with your health care provider.  Document Released: 02/08/2018 Document Revised: 10/16/2019 Document Reviewed: 02/08/2018  [employer] Patient Education © 2020 [employer]      Advance Directive    Advance directives are legal documents that let you make choices ahead of time about your health care and medical treatment in case you become unable to communicate for yourself. Advance directives are a way for you to communicate your wishes to family, friends, and health care providers. This can help convey your decisions about end-of-life care if you become unable to communicate.  Discussing and writing advance directives should happen over time rather than all at once. Advance directives can be changed depending on your situation and what you want, even after you have signed the advance directives.  If you do not have an advance directive, some states assign family decision makers to act on your behalf based on how closely you are related to them. Each state has its own laws regarding advance directives. You may want to check with your health care provider, , or state representative about the laws in your state. There are different types of advance directives, such as:  · Medical power of .  · Living will.  · Do not resuscitate (DNR) or do not attempt resuscitation (DNAR) order.  Health care proxy and medical power of   A  health care proxy, also called a health care agent, is a person who is appointed to make medical decisions for you in cases in which you are unable to make the decisions yourself. Generally, people choose someone they know well and trust to represent their preferences. Make sure to ask this person for an agreement to act as your proxy. A proxy may have to exercise judgment in the event of a medical decision for which your wishes are not known.  A medical power of  is a legal document that names your health care proxy. Depending on the laws in your state, after the document is written, it may also need to be:  · Signed.  · Notarized.  · Dated.  · Copied.  · Witnessed.  · Incorporated into your medical record.  You may also want to appoint someone to manage your financial affairs in a situation in which you are unable to do so. This is called a durable power of  for finances. It is a separate legal document from the durable power of  for health care. You may choose the same person or someone different from your health care proxy to act as your agent in financial matters.  If you do not appoint a proxy, or if there is a concern that the proxy is not acting in your best interests, a court-appointed guardian may be designated to act on your behalf.  Living will  A living will is a set of instructions documenting your wishes about medical care when you cannot express them yourself. Health care providers should keep a copy of your living will in your medical record. You may want to give a copy to family members or friends. To alert caregivers in case of an emergency, you can place a card in your wallet to let them know that you have a living will and where they can find it. A living will is used if you become:  · Terminally ill.  · Incapacitated.  · Unable to communicate or make decisions.  Items to consider in your living will include:  · The use or non-use of life-sustaining equipment, such as  dialysis machines and breathing machines (ventilators).  · A DNR or DNAR order, which is the instruction not to use cardiopulmonary resuscitation (CPR) if breathing or heartbeat stops.  · The use or non-use of tube feeding.  · Withholding of food and fluids.  · Comfort (palliative) care when the goal becomes comfort rather than a cure.  · Organ and tissue donation.  A living will does not give instructions for distributing your money and property if you should pass away. It is recommended that you seek the advice of a  when writing a will. Decisions about taxes, beneficiaries, and asset distribution will be legally binding. This process can relieve your family and friends of any concerns surrounding disputes or questions that may come up about the distribution of your assets.  DNR or DNAR  A DNR or DNAR order is a request not to have CPR in the event that your heart stops beating or you stop breathing. If a DNR or DNAR order has not been made and shared, a health care provider will try to help any patient whose heart has stopped or who has stopped breathing. If you plan to have surgery, talk with your health care provider about how your DNR or DNAR order will be followed if problems occur.  Summary  · Advance directives are the legal documents that allow you to make choices ahead of time about your health care and medical treatment in case you become unable to communicate for yourself.  · The process of discussing and writing advance directives should happen over time. You can change the advance directives, even after you have signed them.  · Advance directives include DNR or DNAR orders, living [place], and designating an agent as your medical power of .  This information is not intended to replace advice given to you by your health care provider. Make sure you discuss any questions you have with your health care provider.  Document Released: 03/26/2009 Document Revised: 01/22/2020 Document Reviewed:  11/06/2017  [employer] Patient Education © 2020 [employer]

## 2020-09-15 NOTE — PROGRESS NOTES
The ABCs of the Annual Wellness Visit  Subsequent Medicare Wellness Visit    Chief Complaint   Patient presents with   • Medicare Wellness-subsequent       Subjective   History of Present Illness:  Richard Garcia is a 70 y.o. male who presents for a Subsequent Medicare Wellness Visit.    HEALTH RISK ASSESSMENT    Recent Hospitalizations:  Recently treated at the following:  [facility]     Current Medical Providers:  Patient Care Team:  Cesar Mcleod MD as PCP - General  Cesar Mcleod MD as PCP - Family Medicine  Cesar Mcleod MD as PCP - Claims Attributed    Smoking Status:  Social History     Tobacco Use   Smoking Status Former Smoker   • Quit date: 2011   • Years since quittin.6   Smokeless Tobacco Never Used       Alcohol Consumption:  Social History     Substance and Sexual Activity   Alcohol Use No   • Frequency: Never       Depression Screen:   PHQ-2/PHQ-9 Depression Screening 9/15/2020   Little interest or pleasure in doing things 0   Feeling down, depressed, or hopeless 0   Trouble falling or staying asleep, or sleeping too much 0   Feeling tired or having little energy 0   Poor appetite or overeating 0   Feeling bad about yourself - or that you are a failure or have let yourself or your family down 0   Trouble concentrating on things, such as reading the newspaper or watching television 0   Moving or speaking so slowly that other people could have noticed. Or the opposite - being so fidgety or restless that you have been moving around a lot more than usual 0   Thoughts that you would be better off dead, or of hurting yourself in some way 0   Total Score 0       Fall Risk Screen:  STEADI Fall Risk Assessment was completed, and patient is at LOW risk for falls.Assessment completed on:9/15/2020    Health Habits and Functional and Cognitive Screening:  Functional & Cognitive Status 9/15/2020   Do you have difficulty preparing food and eating? No   Do you have  difficulty bathing yourself, getting dressed or grooming yourself? No   Do you have difficulty using the toilet? No   Do you have difficulty moving around from place to place? No   Do you have trouble with steps or getting out of a bed or a chair? No   Current Diet Well Balanced Diet   Dental Exam Up to date   Eye Exam Up to date   Exercise (times per week) 4 times per week   Do you need help using the phone?  No   Are you deaf or do you have serious difficulty hearing?  No   Do you need help with transportation? No   Do you need help shopping? No   Do you need help preparing meals?  No   Do you need help with housework?  No   Do you need help with laundry? No   Do you need help taking your medications? No   Do you need help managing money? No   Do you ever drive or ride in a car without wearing a seat belt? No   Have you felt unusual stress, anger or loneliness in the last month? No   Who do you live with? Spouse   If you need help, do you have trouble finding someone available to you? No   Have you been bothered in the last four weeks by sexual problems? No   Do you have difficulty concentrating, remembering or making decisions? No         Does the patient have evidence of cognitive impairment? No    Asprin use counseling:Taking ASA appropriately as indicated    Age-appropriate Screening Schedule:  Refer to the list below for future screening recommendations based on patient's age, sex and/or medical conditions. Orders for these recommended tests are listed in the plan section. The patient has been provided with a written plan.    Health Maintenance   Topic Date Due   • URINE MICROALBUMIN  1950   • TDAP/TD VACCINES (1 - Tdap) 06/18/1968   • ZOSTER VACCINE (1 of 2) 06/18/1999   • DIABETIC FOOT EXAM  06/20/2019   • HEMOGLOBIN A1C  06/20/2019   • COLONOSCOPY  06/20/2019   • INFLUENZA VACCINE  08/01/2020   • DIABETIC EYE EXAM  03/11/2021          The following portions of the patient's history were reviewed and  updated as appropriate: allergies, current medications, past family history, past medical history, past social history, past surgical history and problem list.    Outpatient Medications Prior to Visit   Medication Sig Dispense Refill   • aspirin 81 MG chewable tablet Chew 81 mg Daily.     • cholecalciferol (VITAMIN D3) 400 units tablet Take 400 Units by mouth Daily.     • Cyanocobalamin (VITAMIN B-12 PO) Take 1 tablet by mouth Daily.     • insulin aspart (NOVOLOG) 100 UNIT/ML injection Inject  under the skin into the appropriate area as directed Daily. Pump about 45-50 U per day     • lisinopril (PRINIVIL,ZESTRIL) 20 MG tablet Take 20 mg by mouth Daily.     • omeprazole (priLOSEC) 20 MG capsule Take 1 capsule by mouth Daily.     • benzonatate (TESSALON) 100 MG capsule Take 1 capsule by mouth 3 (Three) Times a Day As Needed for Cough. 30 capsule 0     No facility-administered medications prior to visit.        Patient Active Problem List   Diagnosis   • Dyslipidemia   • Erectile dysfunction   • Fatigue   • GERD (gastroesophageal reflux disease)   • Hepatitis C   • Neuropathy   • Nocturia   • ABY (obstructive sleep apnea)   • Other specified disorders of Eustachian tube, unspecified ear   • Polyosteoarthritis   • Pulmonary nodule   • Type 1 diabetes mellitus (CMS/HCC)   • Vitamin D deficiency   • Hyperglycemia       Advanced Care Planning:  ACP discussion was declined by the patient. Patient does not have an advance directive, information provided.      Compared to one year ago, the patient feels his physical health is the same.  Compared to one year ago, the patient feels his mental health is the same.    Reviewed chart for potential of high risk medication in the elderly: yes  Reviewed chart for potential of harmful drug interactions in the elderly:yes    Objective         Vitals:    09/15/20 1341   BP: 138/74   Pulse: 79   Resp: 16   Temp: 98.9 °F (37.2 °C)   SpO2: 97%   Weight: 75.4 kg (166 lb 3.2 oz)   Height:  "162.6 cm (64\")       Body mass index is 28.53 kg/m².  Discussed the patient's BMI with him. The BMI is in the acceptable range.      Assessment/Plan   Medicare Risks and Personalized Health Plan  CMS Preventative Services Quick Reference  Cardiovascular risk  Diabetic Lab Screening   Prostate Cancer Screening     The above risks/problems have been discussed with the patient.  Pertinent information has been shared with the patient in the After Visit Summary.  Follow up plans and orders are seen below in the Assessment/Plan Section.    Follow Up:  Return in about 6 months (around 3/15/2021).     An After Visit Summary and PPPS were given to the patient.             "

## 2021-05-16 ENCOUNTER — HOSPITAL ENCOUNTER (EMERGENCY)
Facility: HOSPITAL | Age: 71
Discharge: HOME | End: 2021-05-16
Attending: EMERGENCY MEDICINE | Admitting: EMERGENCY MEDICINE

## 2021-05-16 VITALS
SYSTOLIC BLOOD PRESSURE: 115 MMHG | WEIGHT: 161.16 LBS | BODY MASS INDEX: 27.51 KG/M2 | RESPIRATION RATE: 18 BRPM | OXYGEN SATURATION: 99 % | HEIGHT: 64 IN | TEMPERATURE: 98.2 F | DIASTOLIC BLOOD PRESSURE: 69 MMHG | HEART RATE: 78 BPM

## 2021-05-16 DIAGNOSIS — E10.65 UNCONTROLLED TYPE 1 DIABETES MELLITUS WITH HYPERGLYCEMIA (HCC): Primary | ICD-10-CM

## 2021-05-16 LAB
ALBUMIN SERPL-MCNC: 4 G/DL (ref 3.5–5.2)
ALBUMIN/GLOB SERPL: 1.6 G/DL
ALP SERPL-CCNC: 64 U/L (ref 39–117)
ALT SERPL W P-5'-P-CCNC: 23 U/L (ref 1–41)
ANION GAP SERPL CALCULATED.3IONS-SCNC: 16 MMOL/L (ref 5–15)
AST SERPL-CCNC: 18 U/L (ref 1–40)
BASOPHILS # BLD AUTO: 0.1 10*3/MM3 (ref 0–0.2)
BASOPHILS NFR BLD AUTO: 0.6 % (ref 0–1.5)
BILIRUB SERPL-MCNC: 1.6 MG/DL (ref 0–1.2)
BILIRUB UR QL STRIP: NEGATIVE
BUN SERPL-MCNC: 36 MG/DL (ref 8–23)
BUN/CREAT SERPL: 23.8 (ref 7–25)
CALCIUM SPEC-SCNC: 9.7 MG/DL (ref 8.6–10.5)
CHLORIDE SERPL-SCNC: 100 MMOL/L (ref 98–107)
CLARITY UR: CLEAR
CO2 SERPL-SCNC: 21 MMOL/L (ref 22–29)
COLOR UR: YELLOW
CREAT SERPL-MCNC: 1.51 MG/DL (ref 0.76–1.27)
DEPRECATED RDW RBC AUTO: 42 FL (ref 37–54)
EOSINOPHIL # BLD AUTO: 0 10*3/MM3 (ref 0–0.4)
EOSINOPHIL NFR BLD AUTO: 0.1 % (ref 0.3–6.2)
ERYTHROCYTE [DISTWIDTH] IN BLOOD BY AUTOMATED COUNT: 13.1 % (ref 12.3–15.4)
GFR SERPL CREATININE-BSD FRML MDRD: 46 ML/MIN/1.73
GLOBULIN UR ELPH-MCNC: 2.5 GM/DL
GLUCOSE BLDC GLUCOMTR-MCNC: 330 MG/DL (ref 70–105)
GLUCOSE BLDC GLUCOMTR-MCNC: 486 MG/DL (ref 70–105)
GLUCOSE SERPL-MCNC: 482 MG/DL (ref 65–99)
GLUCOSE UR STRIP-MCNC: ABNORMAL MG/DL
HCT VFR BLD AUTO: 41.7 % (ref 37.5–51)
HGB BLD-MCNC: 13.8 G/DL (ref 13–17.7)
HGB UR QL STRIP.AUTO: NEGATIVE
HOLD SPECIMEN: NORMAL
KETONES UR QL STRIP: ABNORMAL
LEUKOCYTE ESTERASE UR QL STRIP.AUTO: NEGATIVE
LYMPHOCYTES # BLD AUTO: 0.6 10*3/MM3 (ref 0.7–3.1)
LYMPHOCYTES NFR BLD AUTO: 5.4 % (ref 19.6–45.3)
MCH RBC QN AUTO: 29.9 PG (ref 26.6–33)
MCHC RBC AUTO-ENTMCNC: 33 G/DL (ref 31.5–35.7)
MCV RBC AUTO: 90.6 FL (ref 79–97)
MONOCYTES # BLD AUTO: 0.2 10*3/MM3 (ref 0.1–0.9)
MONOCYTES NFR BLD AUTO: 2.4 % (ref 5–12)
NEUTROPHILS NFR BLD AUTO: 9.4 10*3/MM3 (ref 1.7–7)
NEUTROPHILS NFR BLD AUTO: 91.5 % (ref 42.7–76)
NITRITE UR QL STRIP: NEGATIVE
NRBC BLD AUTO-RTO: 0.1 /100 WBC (ref 0–0.2)
PH UR STRIP.AUTO: <=5 [PH] (ref 5–8)
PLATELET # BLD AUTO: 198 10*3/MM3 (ref 140–450)
PMV BLD AUTO: 10.3 FL (ref 6–12)
POTASSIUM SERPL-SCNC: 4.5 MMOL/L (ref 3.5–5.2)
PROT SERPL-MCNC: 6.5 G/DL (ref 6–8.5)
PROT UR QL STRIP: NEGATIVE
RBC # BLD AUTO: 4.61 10*6/MM3 (ref 4.14–5.8)
SODIUM SERPL-SCNC: 137 MMOL/L (ref 136–145)
SP GR UR STRIP: 1.03 (ref 1–1.03)
UROBILINOGEN UR QL STRIP: ABNORMAL
WBC # BLD AUTO: 10.3 10*3/MM3 (ref 3.4–10.8)
WHOLE BLOOD HOLD SPECIMEN: NORMAL

## 2021-05-16 PROCEDURE — 99283 EMERGENCY DEPT VISIT LOW MDM: CPT

## 2021-05-16 PROCEDURE — 81003 URINALYSIS AUTO W/O SCOPE: CPT | Performed by: EMERGENCY MEDICINE

## 2021-05-16 PROCEDURE — 82962 GLUCOSE BLOOD TEST: CPT

## 2021-05-16 PROCEDURE — 63710000001 INSULIN LISPRO (HUMAN) PER 5 UNITS: Performed by: EMERGENCY MEDICINE

## 2021-05-16 PROCEDURE — 85025 COMPLETE CBC W/AUTO DIFF WBC: CPT | Performed by: EMERGENCY MEDICINE

## 2021-05-16 PROCEDURE — 80053 COMPREHEN METABOLIC PANEL: CPT | Performed by: EMERGENCY MEDICINE

## 2021-05-16 RX ORDER — INSULIN LISPRO 100 [IU]/ML
5 INJECTION, SOLUTION INTRAVENOUS; SUBCUTANEOUS ONCE
Status: COMPLETED | OUTPATIENT
Start: 2021-05-16 | End: 2021-05-16

## 2021-05-16 RX ADMIN — SODIUM CHLORIDE, POTASSIUM CHLORIDE, SODIUM LACTATE AND CALCIUM CHLORIDE 1000 ML: 600; 310; 30; 20 INJECTION, SOLUTION INTRAVENOUS at 15:10

## 2021-05-16 RX ADMIN — INSULIN LISPRO 5 UNITS: 100 INJECTION, SOLUTION INTRAVENOUS; SUBCUTANEOUS at 16:09

## 2021-05-16 NOTE — ED PROVIDER NOTES
Subjective   History of Present Illness  70-year-old male presents with elevated glucose.  He reports he had a little bit of diarrhea this morning.  He complains of some abdominal cramps.  He states it feels better now.  He reported no urinary difficulty no fever cough or shortness of breath.  He has insulin pump.  His blood sugar have been controlled during the night but then started going up today.  He had given himself a small subcu dose of 2.5 units.  He has had insulin pump for approximately 3 months.  Review of Systems    Past Medical History:   Diagnosis Date   • Diabetes mellitus type I, controlled (CMS/[facility])    • Dyslipidemia    • Erectile dysfunction     Impression: viagra causes headache. Try cilais   • Fatigue     Impression: likely multifactorial,Findings discussed. All questions answered. Differential diagnosis discussed. Follow-up after testing complete, sooner for worsening symptoms or any concerns. Recommend sleep study. Wife says pt snores significantly.   • GERD (gastroesophageal reflux disease)    • History of chicken pox    • History of hepatitis C     Impression: s/p tx with harvoni   • History of tobacco use    • Medicare annual wellness visit, subsequent     with abnormal findings   • Neck pain     Impression: left sided.   • Nocturia    • ABY (obstructive sleep apnea)     Impression: per sleep study. Pt recently bought new mattress, wants to see how he does with that.   • Overweight (BMI 25.0-29.9)    • Screening for alcoholism    • Screening for depression    • Vitamin D deficiency        No Known Allergies    No past surgical history on file.    Family History   Problem Relation Age of Onset   • Diabetes Mother         Diabetes Mellitus    • Coronary artery disease Mother    • Hypertension Mother    • Coronary artery disease Father    • Hypertension Father    • Pancreatic cancer Sister    • Esophageal cancer Brother        Social History     Socioeconomic History   • Marital status:       Spouse name: Not on file   • Number of children: Not on file   • Years of education: Not on file   • Highest education level: Not on file   Tobacco Use   • Smoking status: Former Smoker     Quit date: 2/4/2011     Years since quitting: 10.2   • Smokeless tobacco: Never Used   Vaping Use   • Vaping Use: Never used   Substance and Sexual Activity   • Alcohol use: No   • Drug use: No   • Sexual activity: Defer     Prior to Admission medications    Medication Sig Start Date End Date Taking? Authorizing Provider   aspirin 81 MG chewable tablet Chew 81 mg Daily.    Jsoé Bill MD   benzonatate (TESSALON) 100 MG capsule Take 1 capsule by mouth 3 (Three) Times a Day As Needed for Cough. 1/13/20   Cesar Mcleod MD   cholecalciferol (VITAMIN D3) 400 units tablet Take 400 Units by mouth Daily.    José Bill MD   Cyanocobalamin (VITAMIN B-12 PO) Take 1 tablet by mouth Daily.    José Bill MD   cyclobenzaprine (FLEXERIL) 5 MG tablet Take 1 tablet by mouth 3 (Three) Times a Day As Needed for Muscle Spasms. 2/2/21   Deysi Turner APRN   insulin aspart (NOVOLOG) 100 UNIT/ML injection Inject  under the skin into the appropriate area as directed Daily. Pump about 45-50 U per day    José Bill MD   lisinopril (PRINIVIL,ZESTRIL) 20 MG tablet Take 20 mg by mouth Daily.    José Bill MD   omeprazole (priLOSEC) 20 MG capsule Take 1 capsule by mouth Daily.    José Bill MD           Objective   Physical Exam  70-year-old male awake alert.  Generally well-developed well-nourished.  Pupils equal round at light.  Neck supple chest clear equal breath sounds.  Cardiovascular regular rate and rhythm abdomen soft with no tenderness.  Patient has insulin pump and right abdomen.  This is just been changed around 140 today after blood sugar was elevated.  Skin without rash noted.  Extremities without tenderness or edema  Procedures           ED Course      Results  for orders placed or performed during the hospital encounter of 05/16/21   Comprehensive Metabolic Panel    Specimen: Blood   Result Value Ref Range    Glucose 482 (C) 65 - 99 mg/dL    BUN 36 (H) 8 - 23 mg/dL    Creatinine 1.51 (H) 0.76 - 1.27 mg/dL    Sodium 137 136 - 145 mmol/L    Potassium 4.5 3.5 - 5.2 mmol/L    Chloride 100 98 - 107 mmol/L    CO2 21.0 (L) 22.0 - 29.0 mmol/L    Calcium 9.7 8.6 - 10.5 mg/dL    Total Protein 6.5 6.0 - 8.5 g/dL    Albumin 4.00 3.50 - 5.20 g/dL    ALT (SGPT) 23 1 - 41 U/L    AST (SGOT) 18 1 - 40 U/L    Alkaline Phosphatase 64 39 - 117 U/L    Total Bilirubin 1.6 (H) 0.0 - 1.2 mg/dL    eGFR Non African Amer 46 (L) >60 mL/min/1.73    Globulin 2.5 gm/dL    A/G Ratio 1.6 g/dL    BUN/Creatinine Ratio 23.8 7.0 - 25.0    Anion Gap 16.0 (H) 5.0 - 15.0 mmol/L   Urinalysis With Microscopic If Indicated (No Culture) - Urine, Clean Catch    Specimen: Urine, Clean Catch   Result Value Ref Range    Color, UA Yellow Yellow, Straw    Appearance, UA Clear Clear    pH, UA <=5.0 5.0 - 8.0    Specific Gravity, UA 1.034 (H) 1.005 - 1.030    Glucose, UA >=1000 mg/dL (3+) (A) Negative    Ketones, UA 15 mg/dL (1+) (A) Negative    Bilirubin, UA Negative Negative    Blood, UA Negative Negative    Protein, UA Negative Negative    Leuk Esterase, UA Negative Negative    Nitrite, UA Negative Negative    Urobilinogen, UA 0.2 E.U./dL 0.2 - 1.0 E.U./dL   CBC Auto Differential    Specimen: Blood   Result Value Ref Range    WBC 10.30 3.40 - 10.80 10*3/mm3    RBC 4.61 4.14 - 5.80 10*6/mm3    Hemoglobin 13.8 13.0 - 17.7 g/dL    Hematocrit 41.7 37.5 - 51.0 %    MCV 90.6 79.0 - 97.0 fL    MCH 29.9 26.6 - 33.0 pg    MCHC 33.0 31.5 - 35.7 g/dL    RDW 13.1 12.3 - 15.4 %    RDW-SD 42.0 37.0 - 54.0 fl    MPV 10.3 6.0 - 12.0 fL    Platelets 198 140 - 450 10*3/mm3    Neutrophil % 91.5 (H) 42.7 - 76.0 %    Lymphocyte % 5.4 (L) 19.6 - 45.3 %    Monocyte % 2.4 (L) 5.0 - 12.0 %    Eosinophil % 0.1 (L) 0.3 - 6.2 %    Basophil % 0.6  "0.0 - 1.5 %    Neutrophils, Absolute 9.40 (H) 1.70 - 7.00 10*3/mm3    Lymphocytes, Absolute 0.60 (L) 0.70 - 3.10 10*3/mm3    Monocytes, Absolute 0.20 0.10 - 0.90 10*3/mm3    Eosinophils, Absolute 0.00 0.00 - 0.40 10*3/mm3    Basophils, Absolute 0.10 0.00 - 0.20 10*3/mm3    nRBC 0.1 0.0 - 0.2 /100 WBC   POC Glucose Once    Specimen: Blood   Result Value Ref Range    Glucose 486 (C) 70 - 105 mg/dL   POC Glucose Once    Specimen: Blood   Result Value Ref Range    Glucose 330 (H) 70 - 105 mg/dL   Light Blue Top   Result Value Ref Range    Extra Tube hold for add-on    Gold Top - SST   Result Value Ref Range    Extra Tube Hold for add-ons.      No radiology results for the last day  Medications   lactated ringers bolus 1,000 mL (0 mL Intravenous Stopped 5/16/21 1540)   insulin lispro (ADMELOG) injection 5 Units (5 Units Subcutaneous Given 5/16/21 1609)     /54   Pulse 88   Temp 98 °F (36.7 °C) (Oral)   Resp 16   Ht 162.6 cm (64\")   Wt 73.1 kg (161 lb 2.5 oz)   SpO2 95%   BMI 27.66 kg/m²                                        [facility]  Chart review: Patient had admission for hyperglycemia due to pump failure October 2019  Comorbidity: As per past history  Differential: Pump failure, tubing kink, DKA, infection,  My EKG interpretation:   Lab: Comprehensive metabolic panel glucose 42 BUN 36 creatinine 1.51 CO2 21 urinalysis positive glucose 15 mg/dL ketones, CBC normal white count hemoglobin platelet count 91 segs no bands  Radiology:   Discussion/treatment: Patient received a liter of IV fluids.  He was given insulin 5 units subcu.  Repeat glucose decreased to 330.  Patient was discussed with Dr. Vicente.  Advised since he is just changed his location for his pump that tube kinking is the most common reason for pump failure.  He will be discharged.  He was given a sliding scale that he can use.  Return new or worsening symptoms elevated blood sugar or if he would develop vomiting or fever.  Patient was evaluated " using appropriate PPE      Final diagnoses:   Uncontrolled type 1 diabetes mellitus with hyperglycemia (CMS/[facility])       ED Disposition  ED Disposition     ED Disposition Condition Comment    Discharge Stable           Cesar Mcleod MD  [address] [place] [address] [address]  [place] [address]  [phone]    Schedule an appointment as soon as possible for a visit            Medication List      No changes were made to your prescriptions during this visit.          Genaro Benavidez MD  05/16/21 7156

## 2021-05-16 NOTE — DISCHARGE INSTRUCTIONS
Insulin sliding scale blood glucose 151 to 200 2 units   Blood glucose 201 to 250 give 3 units, blood glucose 251-300 give 4 units  Blood sugar 301 to 350 give 5 units, 351 - 400 give 6 units

## 2022-04-04 PROCEDURE — U0004 COV-19 TEST NON-CDC HGH THRU: HCPCS

## 2022-04-04 PROCEDURE — U0005 INFEC AGEN DETEC AMPLI PROBE: HCPCS

## 2023-02-08 ENCOUNTER — OFFICE (OUTPATIENT)
Dept: URBAN - METROPOLITAN AREA CLINIC 64 | Facility: CLINIC | Age: 73
End: 2023-02-08

## 2023-02-08 VITALS
DIASTOLIC BLOOD PRESSURE: 83 MMHG | SYSTOLIC BLOOD PRESSURE: 139 MMHG | HEIGHT: 64 IN | WEIGHT: 160 LBS | HEART RATE: 64 BPM

## 2023-02-08 DIAGNOSIS — E10.9 TYPE 1 DIABETES MELLITUS WITHOUT COMPLICATIONS: ICD-10-CM

## 2023-02-08 DIAGNOSIS — Z96.41 PRESENCE OF INSULIN PUMP (EXTERNAL) (INTERNAL): ICD-10-CM

## 2023-02-08 DIAGNOSIS — R13.10 DYSPHAGIA, UNSPECIFIED: ICD-10-CM

## 2023-02-08 DIAGNOSIS — Z87.891 PERSONAL HISTORY OF NICOTINE DEPENDENCE: ICD-10-CM

## 2023-02-08 DIAGNOSIS — K21.9 GASTRO-ESOPHAGEAL REFLUX DISEASE WITHOUT ESOPHAGITIS: ICD-10-CM

## 2023-02-08 PROCEDURE — 99204 OFFICE O/P NEW MOD 45 MIN: CPT | Performed by: INTERNAL MEDICINE

## 2023-02-22 RX ORDER — ACETAMINOPHEN 325 MG/1
650 TABLET ORAL EVERY 6 HOURS PRN
COMMUNITY

## 2023-02-22 RX ORDER — IBUPROFEN 200 MG
200 TABLET ORAL EVERY 6 HOURS PRN
COMMUNITY

## 2023-03-06 ENCOUNTER — ANESTHESIA EVENT (OUTPATIENT)
Dept: GASTROENTEROLOGY | Facility: HOSPITAL | Age: 73
End: 2023-03-06
Payer: MEDICARE

## 2023-03-06 NOTE — ANESTHESIA PREPROCEDURE EVALUATION
Anesthesia Evaluation     Patient summary reviewed and Nursing notes reviewed   no history of anesthetic complications:  NPO Solid Status: > 8 hours  NPO Liquid Status: > 2 hours           Airway   Dental      Pulmonary    (+) a smoker Former, sleep apnea,   Cardiovascular     PT is on anticoagulation therapy    (+) hyperlipidemia,       Neuro/Psych  GI/Hepatic/Renal/Endo    (+)  GERD,  hepatitis C, liver disease, diabetes mellitus using insulin,     Musculoskeletal     (+) neck pain,   Abdominal    Substance History      OB/GYN          Other   arthritis,      ROS/Med Hx Other: Additional History:  Hyperglycemia, dysphagia, neuropathy, fatigue      PSH:  CATARACT EXTRACTION, BILATERAL ENDOSCOPY                     Anesthesia Plan    ASA 3     general and MAC     (Patient identified; pre-operative vital signs, all relevant labs/studies, complete medical/surgical/anesthetic history, full medication list, full allergy list, and NPO status obtained/reviewed; physical assessment performed; anesthetic options, side effects, potential complications, risks, and benefits discussed; questions answered; written anesthesia consent obtained; patient cleared for procedure; anesthesia machine and equipment checked and functioning)  intravenous induction     Anesthetic plan, risks, benefits, and alternatives have been provided, discussed and informed consent has been obtained with: patient.    Plan discussed with CRNA and CAA.        CODE STATUS:

## 2023-03-07 ENCOUNTER — ANESTHESIA (OUTPATIENT)
Dept: GASTROENTEROLOGY | Facility: HOSPITAL | Age: 73
End: 2023-03-07
Payer: MEDICARE

## 2023-03-07 ENCOUNTER — HOSPITAL ENCOUNTER (OUTPATIENT)
Facility: HOSPITAL | Age: 73
Setting detail: HOSPITAL OUTPATIENT SURGERY
Discharge: HOME | End: 2023-03-07
Attending: INTERNAL MEDICINE | Admitting: INTERNAL MEDICINE
Payer: MEDICARE

## 2023-03-07 ENCOUNTER — ON CAMPUS - OUTPATIENT (OUTPATIENT)
Dept: URBAN - METROPOLITAN AREA HOSPITAL 85 | Facility: HOSPITAL | Age: 73
End: 2023-03-07

## 2023-03-07 VITALS
DIASTOLIC BLOOD PRESSURE: 58 MMHG | RESPIRATION RATE: 13 BRPM | HEART RATE: 69 BPM | SYSTOLIC BLOOD PRESSURE: 143 MMHG | BODY MASS INDEX: 28.17 KG/M2 | WEIGHT: 159 LBS | OXYGEN SATURATION: 98 % | HEIGHT: 63 IN

## 2023-03-07 DIAGNOSIS — R13.10 DYSPHAGIA: ICD-10-CM

## 2023-03-07 DIAGNOSIS — K31.89 OTHER DISEASES OF STOMACH AND DUODENUM: ICD-10-CM

## 2023-03-07 DIAGNOSIS — K31.7 POLYP OF STOMACH AND DUODENUM: ICD-10-CM

## 2023-03-07 DIAGNOSIS — R13.10 DYSPHAGIA, UNSPECIFIED: ICD-10-CM

## 2023-03-07 LAB — GLUCOSE BLDC GLUCOMTR-MCNC: 159 MG/DL (ref 70–105)

## 2023-03-07 PROCEDURE — 43239 EGD BIOPSY SINGLE/MULTIPLE: CPT | Performed by: INTERNAL MEDICINE

## 2023-03-07 PROCEDURE — 25010000002 PROPOFOL 200 MG/20ML EMULSION: Performed by: NURSE ANESTHETIST, CERTIFIED REGISTERED

## 2023-03-07 PROCEDURE — 25010000002 PROPOFOL 10 MG/ML EMULSION: Performed by: NURSE ANESTHETIST, CERTIFIED REGISTERED

## 2023-03-07 PROCEDURE — 43450 DILATE ESOPHAGUS 1/MULT PASS: CPT | Performed by: INTERNAL MEDICINE

## 2023-03-07 PROCEDURE — 88305 TISSUE EXAM BY PATHOLOGIST: CPT | Performed by: INTERNAL MEDICINE

## 2023-03-07 PROCEDURE — 82962 GLUCOSE BLOOD TEST: CPT

## 2023-03-07 RX ORDER — SODIUM CHLORIDE 0.9 % (FLUSH) 0.9 %
10 SYRINGE (ML) INJECTION AS NEEDED
Status: DISCONTINUED | OUTPATIENT
Start: 2023-03-07 | End: 2023-03-07 | Stop reason: HOSPADM

## 2023-03-07 RX ORDER — SODIUM CHLORIDE 0.9 % (FLUSH) 0.9 %
3 SYRINGE (ML) INJECTION EVERY 12 HOURS SCHEDULED
Status: DISCONTINUED | OUTPATIENT
Start: 2023-03-07 | End: 2023-03-07 | Stop reason: HOSPADM

## 2023-03-07 RX ORDER — SODIUM CHLORIDE 9 MG/ML
INJECTION, SOLUTION INTRAVENOUS CONTINUOUS PRN
Status: DISCONTINUED | OUTPATIENT
Start: 2023-03-07 | End: 2023-03-07 | Stop reason: SURG

## 2023-03-07 RX ORDER — ONDANSETRON 2 MG/ML
4 INJECTION INTRAMUSCULAR; INTRAVENOUS ONCE AS NEEDED
Status: DISCONTINUED | OUTPATIENT
Start: 2023-03-07 | End: 2023-03-07 | Stop reason: HOSPADM

## 2023-03-07 RX ORDER — SODIUM CHLORIDE 9 MG/ML
50 INJECTION, SOLUTION INTRAVENOUS CONTINUOUS
Status: DISCONTINUED | OUTPATIENT
Start: 2023-03-07 | End: 2023-03-07 | Stop reason: HOSPADM

## 2023-03-07 RX ORDER — LIDOCAINE HYDROCHLORIDE 20 MG/ML
INJECTION, SOLUTION INFILTRATION; PERINEURAL AS NEEDED
Status: DISCONTINUED | OUTPATIENT
Start: 2023-03-07 | End: 2023-03-07 | Stop reason: SURG

## 2023-03-07 RX ORDER — SODIUM CHLORIDE 0.9 % (FLUSH) 0.9 %
10 SYRINGE (ML) INJECTION EVERY 12 HOURS SCHEDULED
Status: DISCONTINUED | OUTPATIENT
Start: 2023-03-07 | End: 2023-03-07 | Stop reason: HOSPADM

## 2023-03-07 RX ORDER — PROPOFOL 10 MG/ML
INJECTION, EMULSION INTRAVENOUS AS NEEDED
Status: DISCONTINUED | OUTPATIENT
Start: 2023-03-07 | End: 2023-03-07 | Stop reason: SURG

## 2023-03-07 RX ADMIN — LIDOCAINE HYDROCHLORIDE 60 MG: 20 INJECTION, SOLUTION INFILTRATION; PERINEURAL at 08:17

## 2023-03-07 RX ADMIN — PROPOFOL 80 MCG/KG/MIN: 10 INJECTION, EMULSION INTRAVENOUS at 08:17

## 2023-03-07 RX ADMIN — PROPOFOL 80 MG: 10 INJECTION, EMULSION INTRAVENOUS at 08:17

## 2023-03-07 RX ADMIN — SODIUM CHLORIDE: 0.9 INJECTION, SOLUTION INTRAVENOUS at 08:10

## 2023-03-07 NOTE — H&P
GI CONSULT  NOTE:    Referring Provider:    Lopez Ortega, ADRIANA  [unfilled]    Chief complaint: <principal problem not specified>    Subjective .       Pre op diagnosis  Dysphagia [R13.10]      History of present illness:      Richard Garcia is a 72 y.o. male who presents today for Procedure(s):  ESOPHAGOGASTRODUODENOSCOPY for the indications listed below.     The updated Patient Profile was reviewed prior to the procedure, in conjunction with the Physical Exam, including medical conditions, surgical procedures, medications, allergies, family history and social history.     Pre-operatively, I reviewed the indication(s) for the procedure, the risks of the procedure [including but not limited to: unexpected bleeding possibly requiring hospitalization and/or unplanned repeat procedures, perforation possibly requiring surgical treatment, missed lesions and complications of sedation/MAC (also explained by anesthesia staff)].     I have evaluated the patient for risks associated with the planned anesthesia and the procedure to be performed and find the patient an acceptable candidate for IV sedation.    Multiple opportunities were provided for any questions or concerns, and all questions were answered satisfactorily before any anesthesia was administered. We will proceed with the planned procedure.    Past Medical History:  Past Medical History:   Diagnosis Date   • Diabetes mellitus type I, controlled (HCC)    • Dyslipidemia    • Erectile dysfunction     Impression: viagra causes headache. Try cilais   • Fatigue     Impression: likely multifactorial,Findings discussed. All questions answered. Differential diagnosis discussed. Follow-up after testing complete, sooner for worsening symptoms or any concerns. Recommend sleep study. Wife says pt snores significantly.   • GERD (gastroesophageal reflux disease)    • History of chicken pox    • History of hepatitis C     Impression: s/p tx with harvoni   • History of  tobacco use    • Medicare annual wellness visit, subsequent     with abnormal findings   • Neck pain     Impression: left sided.   • Nocturia    • ABY (obstructive sleep apnea)     Impression: per sleep study. Pt recently bought new mattress, wants to see how he does with that.   • Overweight (BMI 25.0-29.9)    • Screening for alcoholism    • Screening for depression    • Vitamin D deficiency        Past Surgical History:  Past Surgical History:   Procedure Laterality Date   • CATARACT EXTRACTION, BILATERAL     • ENDOSCOPY         Social History:  Social History     Tobacco Use   • Smoking status: Former     Types: Cigarettes     Quit date: 2011     Years since quittin.0   • Smokeless tobacco: Never   Vaping Use   • Vaping Use: Never used   Substance Use Topics   • Alcohol use: No   • Drug use: No       Family History:  Family History   Problem Relation Age of Onset   • Diabetes Mother         Diabetes Mellitus    • Coronary artery disease Mother    • Hypertension Mother    • Coronary artery disease Father    • Hypertension Father    • Pancreatic cancer Sister    • Esophageal cancer Brother        Medications:  Medications Prior to Admission   Medication Sig Dispense Refill Last Dose   • acetaminophen (TYLENOL) 325 MG tablet Take 2 tablets by mouth Every 6 (Six) Hours As Needed for Mild Pain.   Past Week   • cholecalciferol (VITAMIN D3) 400 units tablet Take 1 tablet by mouth Daily.   3/6/2023   • cyclobenzaprine (FLEXERIL) 5 MG tablet Take 1 tablet by mouth 3 (Three) Times a Day As Needed for Muscle Spasms. 21 tablet 0 3/6/2023   • ibuprofen (ADVIL,MOTRIN) 200 MG tablet Take 1 tablet by mouth Every 6 (Six) Hours As Needed for Mild Pain.   Past Week   • insulin aspart (novoLOG) 100 UNIT/ML injection Inject  under the skin into the appropriate area as directed Daily. Pump about 45-50 U per day   3/7/2023   • lisinopril (PRINIVIL,ZESTRIL) 20 MG tablet Take 1 tablet by mouth Daily.   3/6/2023   • omeprazole  "(priLOSEC) 20 MG capsule Take 1 capsule by mouth Daily.   3/6/2023   • aspirin 81 MG chewable tablet Chew 81 mg Daily.      • azelastine (ASTELIN) 0.1 % nasal spray INSTILL 2 SPRAYS IN EACH NOSTRIL TWICE DAILY AS DIRECTED BY PROVIDER      • carbidopa-levodopa CR (SINEMET CR)  MG per CR tablet Take 1 tablet by mouth.      • cetirizine (zyrTEC) 10 MG tablet Take 1 tablet by mouth Daily for 14 days. 14 tablet 0    • Cyanocobalamin (VITAMIN B-12 PO) Take 1 tablet by mouth Daily.      • fluticasone (FLONASE) 50 MCG/ACT nasal spray           Scheduled Meds:   Continuous Infusions:sodium chloride, 50 mL/hr      PRN Meds:.•  ondansetron    ALLERGIES:  Baclofen and Pramipexole    ROS:  The following systems were reviewed and negative;  Constitution:  No fevers, chills, no unintentional weight loss  Skin: no rash, no jaundice  Eyes:  No blurry vision, no eye pain  HENT:  No change in hearing or smell  Resp:  No dyspnea or cough  CV:  No chest pain or palpitations  :  No dysuria, hematuria  Musculoskeletal:  No leg cramps or arthralgias  Neuro:  No tremor, no numbness  Psych:  No depression or confsusion    Objective     Vital Signs:   Vitals:    02/22/23 1118 03/07/23 0700   BP:  150/69   BP Location:  Left arm   Patient Position:  Lying   Pulse:  75   Resp:  12   SpO2:  96%   Weight: 72.1 kg (159 lb)    Height: 160 cm (63\")        Physical Exam:       General Appearance:    Awake and alert, in no acute distress   Head:    Normocephalic, without obvious abnormality, atraumatic   Throat:   No oral lesions, no thrush, oral mucosa moist   Lungs:     respirations regular, even and unlabored   Skin:   No rash, no jaundice       Results Review:  Lab Results (last 24 hours)     Procedure Component Value Units Date/Time    POC Glucose Once [[id number]]  (Abnormal) Collected: 03/07/23 0652    Specimen: Blood Updated: 03/07/23 0653     Glucose 159 mg/dL      Comment: Serial Number: [id number]:  [id number]       "       Imaging Results (Last 24 Hours)     ** No results found for the last 24 hours. **           I reviewed the patient's labs and imaging.    ASSESSMENT AND PLAN:      Active Problems:    * No active hospital problems. *       Procedure(s):  ESOPHAGOGASTRODUODENOSCOPY      I discussed the patient's findings and my recommendations with the patient.    Margarito Dorado MD  03/07/23  08:14 EST

## 2023-03-07 NOTE — DISCHARGE INSTRUCTIONS
A responsible adult should stay with you and you should rest quietly for the rest of the day.    Do not drink alcohol, drive, operate any heavy machinery or power tools or make any legal/important decisions for the next 24 hours.     Progress your diet as tolerated.  If you begin to experience severe pain, increased shortness of breath, racing heartbeat or a fever above 101 F, seek immediate medical attention.     Follow up with MD as instructed. Call office for results in 3 to 5 days if needed.     [phone]

## 2023-03-07 NOTE — ANESTHESIA POSTPROCEDURE EVALUATION
Patient: Richard Garcia    Procedure Summary     Date: 03/07/23 Room / Location: [facility] ENDOSCOPY 4 / [facility] ENDOSCOPY    Anesthesia Start: 0810 Anesthesia Stop: 0828    Procedure: ESOPHAGOGASTRODUODENOSCOPY WITH BIOPSY X2 AREAS  AND DILATATION (BOUGIE #54,#58) Diagnosis:       Dysphagia      (Dysphagia [R13.10])    Surgeons: Margarito Dorado MD Provider: Alfonzo Toledo MD    Anesthesia Type: general, MAC ASA Status: 3          Anesthesia Type: general, MAC    Vitals  Vitals Value Taken Time   /58 03/07/23 0847   Temp     Pulse 68 03/07/23 0849   Resp 13 03/07/23 0847   SpO2 96 % 03/07/23 0849   Vitals shown include unvalidated device data.        Post Anesthesia Care and Evaluation    Patient location during evaluation: PACU  Patient participation: complete - patient participated  Level of consciousness: awake  Pain scale: See nurse's notes for pain score.  Pain management: adequate    Airway patency: patent  Anesthetic complications: No anesthetic complications  PONV Status: none  Cardiovascular status: acceptable  Respiratory status: acceptable and spontaneous ventilation  Hydration status: acceptable    Comments: Patient seen and examined postoperatively; vital signs stable; SpO2 greater than or equal to 90%; cardiopulmonary status stable; nausea/vomiting adequately controlled; pain adequately controlled; no apparent anesthesia complications; patient discharged from anesthesia care when discharge criteria were met

## 2023-03-07 NOTE — OP NOTE
ESOPHAGOGASTRODUODENOSCOPY Procedure Report    Patient Name:  Richard Garcia  YOB: 1950    Date of Surgery:  3/7/2023     Pre-Op Diagnosis:  Dysphagia [R13.10]    Postop diagnosis:  1.  Gastritis    Procedure/CPT® Codes:      Procedure(s):  ESOPHAGOGASTRODUODENOSCOPY WITH BIOPSY X2 AREAS  AND DILATATION (BOUGIE #54,#58)    Staff:  Surgeon(s):  Margarito Dorado MD      Anesthesia: Monitored Anesthesia Care    Description of Procedure:  A description of the procedure as well as risks, benefits and alternative methods were explained to the patient who voiced understanding and signed the corresponding consent form. A physical exam was performed and vital signs were monitored throughout the procedure.    An upper GI endoscope was placed into the mouth and proceeded through the esophagus, stomach and second portion of the duodenum without difficulty. The scope was then retroflexed and the fundus was visualized. The procedure was not difficult and there were no immediate complications.  There was no blood loss.    Impression:  1.  Normal esophageal mucosa entire esophagus, a 54 Finnish nonguided bougie dilator was advanced blindly into the esophagus with minimal to no resistance no trauma upon relook so the size was increased to a 58 Finnish nonguided bougie dilator advanced into the esophagus with minimal to no resistance no trauma upon relook  2.  Mild erythema in the antrum suggestive of nonerosive gastritis, cold forcep biopsies of the antrum and body were taken for histopathology and H. pylori  3.  Normal duodenal mucosa visualized to D3    Recommendations:  1.  Follow-up biopsy results and treat H. pylori if positive  2.  If dysphagia persist, recommend esophageal manometry      Margarito Dorado MD     Date: 3/7/2023    Time: 09:34 EST

## 2023-03-07 NOTE — H&P
GI CONSULT  NOTE:    Referring Provider:    Lopez Ortega, ADRIANA  [unfilled]    Chief complaint: <principal problem not specified>    Subjective .       Pre op diagnosis  Dysphagia [R13.10]      History of present illness:      Richard Garcia is a 72 y.o. male who presents today for Procedure(s):  ESOPHAGOGASTRODUODENOSCOPY WITH BIOPSY X2 AREAS  AND DILATATION (BOUGIE #54,#58) for the indications listed below.     The updated Patient Profile was reviewed prior to the procedure, in conjunction with the Physical Exam, including medical conditions, surgical procedures, medications, allergies, family history and social history.     Pre-operatively, I reviewed the indication(s) for the procedure, the risks of the procedure [including but not limited to: unexpected bleeding possibly requiring hospitalization and/or unplanned repeat procedures, perforation possibly requiring surgical treatment, missed lesions and complications of sedation/MAC (also explained by anesthesia staff)].     I have evaluated the patient for risks associated with the planned anesthesia and the procedure to be performed and find the patient an acceptable candidate for IV sedation.    Multiple opportunities were provided for any questions or concerns, and all questions were answered satisfactorily before any anesthesia was administered. We will proceed with the planned procedure.    Past Medical History:  Past Medical History:   Diagnosis Date   • Diabetes mellitus type I, controlled (HCC)    • Dyslipidemia    • Erectile dysfunction     Impression: viagra causes headache. Try cilais   • Fatigue     Impression: likely multifactorial,Findings discussed. All questions answered. Differential diagnosis discussed. Follow-up after testing complete, sooner for worsening symptoms or any concerns. Recommend sleep study. Wife says pt snores significantly.   • GERD (gastroesophageal reflux disease)    • History of chicken pox    • History of hepatitis  C     Impression: s/p tx with harvoni   • History of tobacco use    • Medicare annual wellness visit, subsequent     with abnormal findings   • Neck pain     Impression: left sided.   • Nocturia    • ABY (obstructive sleep apnea)     Impression: per sleep study. Pt recently bought new mattress, wants to see how he does with that.   • Overweight (BMI 25.0-29.9)    • Screening for alcoholism    • Screening for depression    • Vitamin D deficiency        Past Surgical History:  Past Surgical History:   Procedure Laterality Date   • CATARACT EXTRACTION, BILATERAL     • ENDOSCOPY         Social History:  Social History     Tobacco Use   • Smoking status: Former     Types: Cigarettes     Quit date: 2011     Years since quittin.0   • Smokeless tobacco: Never   Vaping Use   • Vaping Use: Never used   Substance Use Topics   • Alcohol use: No   • Drug use: No       Family History:  Family History   Problem Relation Age of Onset   • Diabetes Mother         Diabetes Mellitus    • Coronary artery disease Mother    • Hypertension Mother    • Coronary artery disease Father    • Hypertension Father    • Pancreatic cancer Sister    • Esophageal cancer Brother        Medications:  Medications Prior to Admission   Medication Sig Dispense Refill Last Dose   • acetaminophen (TYLENOL) 325 MG tablet Take 2 tablets by mouth Every 6 (Six) Hours As Needed for Mild Pain.   Past Week   • cholecalciferol (VITAMIN D3) 400 units tablet Take 1 tablet by mouth Daily.   3/6/2023   • cyclobenzaprine (FLEXERIL) 5 MG tablet Take 1 tablet by mouth 3 (Three) Times a Day As Needed for Muscle Spasms. 21 tablet 0 3/6/2023   • ibuprofen (ADVIL,MOTRIN) 200 MG tablet Take 1 tablet by mouth Every 6 (Six) Hours As Needed for Mild Pain.   Past Week   • insulin aspart (novoLOG) 100 UNIT/ML injection Inject  under the skin into the appropriate area as directed Daily. Pump about 45-50 U per day   3/7/2023   • lisinopril (PRINIVIL,ZESTRIL) 20 MG tablet Take  "1 tablet by mouth Daily.   3/6/2023   • omeprazole (priLOSEC) 20 MG capsule Take 1 capsule by mouth Daily.   3/6/2023   • aspirin 81 MG chewable tablet Chew 81 mg Daily.      • azelastine (ASTELIN) 0.1 % nasal spray INSTILL 2 SPRAYS IN EACH NOSTRIL TWICE DAILY AS DIRECTED BY PROVIDER      • carbidopa-levodopa CR (SINEMET CR)  MG per CR tablet Take 1 tablet by mouth.      • cetirizine (zyrTEC) 10 MG tablet Take 1 tablet by mouth Daily for 14 days. 14 tablet 0    • Cyanocobalamin (VITAMIN B-12 PO) Take 1 tablet by mouth Daily.      • fluticasone (FLONASE) 50 MCG/ACT nasal spray           Scheduled Meds:sodium chloride, 3 mL, Intravenous, Q12H      Continuous Infusions:sodium chloride, 50 mL/hr      PRN Meds:.•  ondansetron  •  sodium chloride    ALLERGIES:  Baclofen and Pramipexole    ROS:  The following systems were reviewed and negative;  Constitution:  No fevers, chills, no unintentional weight loss  Skin: no rash, no jaundice  Eyes:  No blurry vision, no eye pain  HENT:  No change in hearing or smell  Resp:  No dyspnea or cough  CV:  No chest pain or palpitations  :  No dysuria, hematuria  Musculoskeletal:  No leg cramps or arthralgias  Neuro:  No tremor, no numbness  Psych:  No depression or confsusion    Objective     Vital Signs:   Vitals:    02/22/23 1118 03/07/23 0700 03/07/23 0827 03/07/23 0837   BP:  150/69 119/54 121/58   BP Location:  Left arm     Patient Position:  Lying     Pulse:  75 69 70   Resp:  12 16 20   SpO2:  96% 99% 96%   Weight: 72.1 kg (159 lb)      Height: 160 cm (63\")          Physical Exam:       General Appearance:    Awake and alert, in no acute distress   Head:    Normocephalic, without obvious abnormality, atraumatic   Throat:   No oral lesions, no thrush, oral mucosa moist   Lungs:     respirations regular, even and unlabored   Skin:   No rash, no jaundice       Results Review:  Lab Results (last 24 hours)     Procedure Component Value Units Date/Time    POC Glucose Once " [[id number]]  (Abnormal) Collected: 03/07/23 0652    Specimen: Blood Updated: 03/07/23 0653     Glucose 159 mg/dL      Comment: Serial Number: [id number]:  [id number]             Imaging Results (Last 24 Hours)     ** No results found for the last 24 hours. **           I reviewed the patient's labs and imaging.    ASSESSMENT AND PLAN:      Active Problems:    * No active hospital problems. *       Procedure(s):  ESOPHAGOGASTRODUODENOSCOPY WITH BIOPSY X2 AREAS  AND DILATATION (BOUGIE #54,#58)      I discussed the patient's findings and my recommendations with the patient.    Margarito Dorado MD  03/07/23  08:41 EST

## 2023-03-08 LAB
LAB AP CASE REPORT: NORMAL
PATH REPORT.FINAL DX SPEC: NORMAL
PATH REPORT.GROSS SPEC: NORMAL

## 2023-05-08 ENCOUNTER — OFFICE (OUTPATIENT)
Dept: URBAN - METROPOLITAN AREA CLINIC 64 | Facility: CLINIC | Age: 73
End: 2023-05-08

## 2023-05-08 VITALS
HEIGHT: 64 IN | DIASTOLIC BLOOD PRESSURE: 73 MMHG | WEIGHT: 154.4 LBS | SYSTOLIC BLOOD PRESSURE: 134 MMHG | HEART RATE: 78 BPM

## 2023-05-08 DIAGNOSIS — Z87.891 PERSONAL HISTORY OF NICOTINE DEPENDENCE: ICD-10-CM

## 2023-05-08 DIAGNOSIS — R13.10 DYSPHAGIA, UNSPECIFIED: ICD-10-CM

## 2023-05-08 DIAGNOSIS — R53.1 WEAKNESS: ICD-10-CM

## 2023-05-08 PROCEDURE — 99213 OFFICE O/P EST LOW 20 MIN: CPT | Performed by: NURSE PRACTITIONER

## 2023-05-19 ENCOUNTER — OFFICE (OUTPATIENT)
Dept: URBAN - METROPOLITAN AREA CLINIC 51 | Facility: CLINIC | Age: 73
End: 2023-05-19

## 2023-05-19 DIAGNOSIS — R13.10 DYSPHAGIA, UNSPECIFIED: ICD-10-CM

## 2023-05-19 DIAGNOSIS — K22.4 DYSKINESIA OF ESOPHAGUS: ICD-10-CM

## 2023-05-19 PROCEDURE — 91010 ESOPHAGUS MOTILITY STUDY: CPT | Performed by: NURSE PRACTITIONER

## 2023-05-19 PROCEDURE — 91037 ESOPH IMPED FUNCTION TEST: CPT | Mod: 59 | Performed by: NURSE PRACTITIONER

## 2023-06-08 ENCOUNTER — TRANSCRIBE ORDERS (OUTPATIENT)
Dept: ADMINISTRATIVE | Facility: HOSPITAL | Age: 73
End: 2023-06-08
Payer: MEDICARE

## 2023-06-08 DIAGNOSIS — R13.10 DYSPHAGIA, UNSPECIFIED TYPE: Primary | ICD-10-CM

## 2023-08-01 ENCOUNTER — OFFICE (OUTPATIENT)
Dept: URBAN - METROPOLITAN AREA CLINIC 64 | Facility: CLINIC | Age: 73
End: 2023-08-01

## 2023-08-01 VITALS
HEART RATE: 71 BPM | HEIGHT: 64 IN | WEIGHT: 151 LBS | DIASTOLIC BLOOD PRESSURE: 67 MMHG | SYSTOLIC BLOOD PRESSURE: 135 MMHG

## 2023-08-01 DIAGNOSIS — R13.10 DYSPHAGIA, UNSPECIFIED: ICD-10-CM

## 2023-08-01 DIAGNOSIS — K21.9 GASTRO-ESOPHAGEAL REFLUX DISEASE WITHOUT ESOPHAGITIS: ICD-10-CM

## 2023-08-01 PROCEDURE — 99214 OFFICE O/P EST MOD 30 MIN: CPT | Performed by: INTERNAL MEDICINE

## 2023-08-25 ENCOUNTER — HOSPITAL ENCOUNTER (OUTPATIENT)
Dept: GENERAL RADIOLOGY | Facility: HOSPITAL | Age: 73
Discharge: HOME | End: 2023-08-25
Payer: MEDICARE

## 2023-08-25 DIAGNOSIS — R13.10 DYSPHAGIA, UNSPECIFIED TYPE: ICD-10-CM

## 2023-08-25 PROCEDURE — 92611 MOTION FLUOROSCOPY/SWALLOW: CPT

## 2023-08-25 PROCEDURE — 74230 X-RAY XM SWLNG FUNCJ C+: CPT

## 2023-08-25 RX ADMIN — BARIUM SULFATE 50 ML: 400 SUSPENSION ORAL at 10:17

## 2023-08-25 NOTE — MBS/VFSS/FEES
Outpatient Speech Language Pathology   Adult Swallow Initial Evaluation  [facility]     Patient Name: Richard Garcia  : 1950  MRN: [id number]  Today's Date: 2023         Visit Date: 2023   Patient Active Problem List   Diagnosis    Dyslipidemia    Erectile dysfunction    Fatigue    GERD (gastroesophageal reflux disease)    Hepatitis C    Neuropathy    Nocturia    ABY (obstructive sleep apnea)    Other specified disorders of Eustachian tube, unspecified ear    Polyosteoarthritis    Pulmonary nodule    Type 1 diabetes mellitus    Vitamin D deficiency    Hyperglycemia        Past Medical History:   Diagnosis Date    Diabetes mellitus type I, controlled     Dyslipidemia     Erectile dysfunction     Impression: viagra causes headache. Try cilais    Fatigue     Impression: likely multifactorial,Findings discussed. All questions answered. Differential diagnosis discussed. Follow-up after testing complete, sooner for worsening symptoms or any concerns. Recommend sleep study. Wife says pt snores significantly.    GERD (gastroesophageal reflux disease)     History of chicken pox     History of hepatitis C     Impression: s/p tx with harvoni    History of tobacco use     Medicare annual wellness visit, subsequent     with abnormal findings    Neck pain     Impression: left sided.    Nocturia     ABY (obstructive sleep apnea)     Impression: per sleep study. Pt recently bought new mattress, wants to see how he does with that.    Overweight (BMI 25.0-29.9)     Screening for alcoholism     Screening for depression     Vitamin D deficiency         Past Surgical History:   Procedure Laterality Date    CATARACT EXTRACTION, BILATERAL      ENDOSCOPY      ENDOSCOPY N/A 3/7/2023    Procedure: ESOPHAGOGASTRODUODENOSCOPY WITH BIOPSY X2 AREAS  AND DILATATION (BOUGIE #54,#58);  Surgeon: Margarito Dorado MD;  Location: [facility] ENDOSCOPY;  Service: Gastroenterology;  Laterality: N/A;  POST: GASTRIC POLYPS AND DYSPHAGIA          Visit Dx:     ICD-10-CM ICD-9-CM   1. Dysphagia, unspecified type  R13.10 787.20                SLP Adult Swallow Evaluation       Row Name 08/25/23 1200       Rehab Evaluation    Document Type evaluation  -LF    Patient Observations alert;cooperative;agree to therapy  -LF    Patient Effort excellent  -LF    Symptoms Noted During/After Treatment none  -LF       General Information    Patient Profile Reviewed yes  -LF    Pertinent History Of Current Problem Pt is a 74 y/o male who presents to [facility] for an OP VFSS to objectively assess swallow function and determine safest and L/R diet. Pt reports vocal quality changes and dysphagia w/ solids, such as, crackers and chips. He is followed by GI and recently underwent an EGD w/ dilation ~4-5 months ago. He endorses history of reflux and unexplained weight loss. He denies hx of head/neck cancer, CVA, PNA, or cervical spine surgery. He lives at home w/ his spouse and consumes a regular and thin liquid diet.   -LF    Current Method of Nutrition regular textures;thin liquids  -LF    Precautions/Limitations, Vision WFL;for purposes of eval  -LF    Precautions/Limitations, Hearing WFL;for purposes of eval  -LF    Plans/Goals Discussed with patient;spouse/S.O.;agreed upon  -LF    Barriers to Rehab none identified  -LF    Patient's Goals for Discharge eat/drink without coughing/choking  -LF    Family Goals for Discharge patient able to eat/drink without coughing/choking  -LF       Oral Motor Structure and Function    Dentition Assessment upper dentures/partial in place;lower dentures/partial in place  -LF    Secretion Management WNL/WFL  -LF    Mucosal Quality moist, healthy  -LF       Oral Musculature and Cranial Nerve Assessment    Oral Motor General Assessment WFL    Reduced vocal volume  -LF       General Eating/Swallowing Observations    Respiratory Support Currently in Use room air  -LF    Eating/Swallowing Skills self-fed;appropriate self-feeding skills observed   -LF    Positioning During Eating upright 90 degree;upright in chair  -LF       MBS/VFSS    Utensils Used spoon;cup;straw  -LF    Consistencies Trialed regular textures;mixed consistency;pureed;thin liquids;nectar/syrup-thick liquids  -LF       MBS/VFSS Interpretation    VFSS Summary Pt observed upright in the lateral projection and given trials of thin liquids by cup x2, nectar thick liquids by cup x2, by straw x2, puree x2, mechanical soft (mixed) x2, and regular solids x1. Upon initial view, epiglottis ill defined. Pt self fed all trials and independently took appropriate sized bites/sips. Oral phase characterized by adequate mastication of both soft and regular solids. Reduced bolus control w/ premature spillage to the pyriforms w/ thin liquids and thin liquid portion of mixed consistencies and to the vallecula w/ puree trials. Mildly disorganized oral transit. Pharyngeal phase characterized by adequate hyolaryngeal elevation but reduced epiglottis deflection, resulting in deep penetration to the vocal cords, w/ eventual silent aspiration of thin liquids. Cued chin tuck w/ thin liquids did not prevent penetration. Transient penetration during the swallow, which clears after the swallow, observed w/ all trials of nectar thick liquids. No penetration or aspiration observed at any time w/ all trials of puree, mechanical soft (mixed), and regular solids. Trace to mild BOT and pharyngeal residue w/ all trials of thin liquids, puree, and mechanical soft. Pt independently clears residue w/ a dry swallow     Recommend pt initiate a regular and nectar thick liquid diet w/ [place] Water Protocol. Pt should be upright at 90 degrees for all PO, take small single bites/sips, and follow reflux precautions. Pt would benefit from OP skilled ST targeting dysphagia. Results and recs reviewed w/ pt and pt's spouse immediately following procedure w/ playback of study via [facility]. Handouts and nectar thick liquid starter kit provided.  Both verbalized understanding. Thank you for referring this pleasant pt.      The rationale to recommend water when a PO diet cannot appropriately/functionally sustain nutrition (or if thickened liquids are prescribed due to aspiration of thin liquids) is because water is low risk for aspiration pna when compared to aspiration of food or other liquids.    Benefits of a water protocol include but are not limited to:     Oral gratification   Engagement of oropharyngeal swallow musculature   Decrease likelihood of dehydration      Guidelines for proper implementation include:  Waiting a minimum of 30 minutes after consuming any other PO   Thorough oral care prior to consuming water  Upright at 90 degree hip flexion  Small sips at slow rate  Monitor for any changes in respiratory status and discontinue if distress noted    The [place] Water Protocol is a research based protocol established in 1984.     -LF       Initiation of Pharyngeal Swallow    Pharyngeal Phase impaired pharyngeal phase of swallowing  -LF    Penetration During the Swallow thin liquids  -LF    Aspiration During the Swallow thin liquids  -LF    Depth of Penetration deep  -LF    Response to Aspiration No  -LF    Rosenbek's Scale thin:;5--->level 5;8--->level 8  -LF    Attempted Compensatory Maneuvers chin tuck  -LF    Response to Attempted Compensatory Maneuvers did not prevent penetration  -LF       SLP Evaluation Clinical Impression    SLP Swallowing Diagnosis mild-moderate;oral dysphagia;pharyngeal dysphagia  -LF    Functional Impact risk of aspiration/pneumonia;risk of dehydration  -LF    Rehab Potential/Prognosis, Swallowing good, to achieve stated therapy goals  -LF    Swallow Criteria for Skilled Therapeutic Interventions Met demonstrates skilled criteria  -LF       Recommendations    Therapy Frequency (Swallow) 1 day per week  -LF    Predicted Duration Therapy Intervention (Days) until discharge;other (see comments)  per treating SLP  discretion  -    SLP Diet Recommendation regular textures;nectar thick liquids;other (see comments)  [place] Water Protocol  -    Recommended Diagnostics reassess via VFSS ([facility])  -    Recommended Precautions and Strategies upright posture during/after eating;small bites of food and sips of liquid;general aspiration precautions;reflux precautions  -    Oral Care Recommendations Oral Care BID/PRN;Oral Care before breakfast, after meals and PRN;Before ice/water;Denture Care  -    SLP Rec. for Method of Medication Administration meds whole;meds crushed;with thick liquids;with puree  -    Monitor for Signs of Aspiration yes;notify SLP if any concerns;cough;gurgly voice;throat clearing  -    Anticipated Discharge Disposition (SLP) home with OP services  -       Swallow Goals (SLP)    Swallow LTGs Swallow Long Term Goal (free text)  -    Swallow STGs pharyngeal strengthening exercise goal selection (SLP);swallow management recall goal selection (SLP)  -    Pharyngeal Strengthening Exercise Goal Selection (SLP) pharyngeal strengthening exercise, SLP goal 1  -    Swallow Management Recall Goal Selection (SLP) swallow management recall, SLP goal 1  -LF       (LTG) Swallow    (LTG) Swallow The patient will maximize swallow function for least restrictive po diet, exhibiting no complications associated with dysphagia, adequate po intake, and demonstrating independent use of safe swallow compensations.  -LF    [place] (Swallow Long Term Goal) with minimal cues (75-90% accuracy)  -    Time Frame (Swallow Long Term Goal) by discharge  -    Progress/Outcomes (Swallow Long Term Goal) new goal  -LF       (STG) Pharyngeal Strengthening Exercise Goal 1 (SLP)    Activity (Pharyngeal Strengthening Goal 1, SLP) increase epiglottic inversion and retroflexion;increase tongue base retraction  -    Increase Epiglottic Inversion and Retroflexion supraglottic swallow;super-supraglottic swallow;hard effortful  swallow  -LF    Increase Tongue Base Retraction hiro  -LF    [place]/Accuracy (Pharyngeal Strengthening Goal 1, SLP) with minimal cues (75-90% accuracy)  -LF    Time Frame (Pharyngeal Strengthening Goal 1, SLP) by discharge  -LF    Progress/Outcomes (Pharyngeal Strengthening Goal 1, SLP) new goal  -LF       (STG) Swallow Management Recall Goal 1 (SLP)    Activity (Swallow Management Recall Goal 1, SLP) recall of;aspiration precautions;reflux precautions;oral care recommendations;safe diet/liquid level;compensatory swallow strategies/techniques  -LF    [place]/Accuracy (Swallow Management Recall Goal 1, SLP) with minimal cues (75-90% accuracy)  -LF    Time Frame (Swallow Management Recall Goal 1, SLP) by discharge  -LF    Progress/Outcomes (Swallow Management Recall Goal 1, SLP) new goal  -LF              User Key  (r) = Recorded By, (t) = Taken By, (c) = Cosigned By      Initials Name Provider Type    LF Cata Lopez, SLP Speech and Language Pathologist                                               Time Calculation:                     AMBER Sevilla  8/25/2023

## 2023-08-29 ENCOUNTER — HOSPITAL ENCOUNTER (OUTPATIENT)
Dept: SPEECH THERAPY | Facility: HOSPITAL | Age: 73
Discharge: HOME | End: 2023-08-29
Admitting: NURSE PRACTITIONER
Payer: MEDICARE

## 2023-08-29 PROCEDURE — 92526 ORAL FUNCTION THERAPY: CPT

## 2023-08-29 NOTE — THERAPY TREATMENT NOTE
Acute Care - Speech Language Pathology   Swallow Treatment Note  [place]     Patient Name: Richard Garcia  : 1950  MRN: [id number]  Today's Date: 2023               Admit Date: 2023    Visit Dx:   No diagnosis found.  Patient Active Problem List   Diagnosis    Dyslipidemia    Erectile dysfunction    Fatigue    GERD (gastroesophageal reflux disease)    Hepatitis C    Neuropathy    Nocturia    ABY (obstructive sleep apnea)    Other specified disorders of Eustachian tube, unspecified ear    Polyosteoarthritis    Pulmonary nodule    Type 1 diabetes mellitus    Vitamin D deficiency    Hyperglycemia     Past Medical History:   Diagnosis Date    Diabetes mellitus type I, controlled     Dyslipidemia     Erectile dysfunction     Impression: viagra causes headache. Try cilais    Fatigue     Impression: likely multifactorial,Findings discussed. All questions answered. Differential diagnosis discussed. Follow-up after testing complete, sooner for worsening symptoms or any concerns. Recommend sleep study. Wife says pt snores significantly.    GERD (gastroesophageal reflux disease)     History of chicken pox     History of hepatitis C     Impression: s/p tx with harvoni    History of tobacco use     Medicare annual wellness visit, subsequent     with abnormal findings    Neck pain     Impression: left sided.    Nocturia     ABY (obstructive sleep apnea)     Impression: per sleep study. Pt recently bought new mattress, wants to see how he does with that.    Overweight (BMI 25.0-29.9)     Screening for alcoholism     Screening for depression     Vitamin D deficiency      Past Surgical History:   Procedure Laterality Date    CATARACT EXTRACTION, BILATERAL      ENDOSCOPY      ENDOSCOPY N/A 3/7/2023    Procedure: ESOPHAGOGASTRODUODENOSCOPY WITH BIOPSY X2 AREAS  AND DILATATION (BOUGIE #[id number],#58);  Surgeon: Margarito Dorado MD;  Location: [facility] ENDOSCOPY;  Service: Gastroenterology;  Laterality: N/A;  POST: GASTRIC  POLYPS AND DYSPHAGIA       SLP Recommendation and Plan  SLP Swallowing Diagnosis: mild-moderate, oral dysphagia, pharyngeal dysphagia (08/29/23 1500)  SLP Diet Recommendation: regular textures, nectar thick liquids ([place] Protocol) (08/29/23 1500)  Recommended Precautions and Strategies: upright posture during/after eating, small bites of food and sips of liquid, general aspiration precautions, reflux precautions (08/29/23 1500)  SLP Rec. for Method of Medication Administration: meds whole, meds crushed, with thick liquids, with puree, as tolerated (08/29/23 1500)     Monitor for Signs of Aspiration: yes, notify SLP if any concerns (08/29/23 1500)  Recommended Diagnostics: reassess via clinical swallow evaluation, reassess via VFSS ([facility] [place] [facility]) (08/29/23 1500)  Swallow Criteria for Skilled Therapeutic Interventions Met: demonstrates skilled criteria (08/29/23 1500)     Rehab Potential/Prognosis, Swallowing: good, to achieve stated therapy goals (08/29/23 1500)  Therapy Frequency (Swallow): 1 day per week (08/29/23 1500)  Predicted Duration Therapy Intervention (Days): until discharge (per treating SLP discretion) (08/29/23 1500)  Oral Care Recommendations: Oral Care BID/PRN (08/29/23 1500)                                      Oral Care Recommendations: Oral Care BID/PRN (08/29/23 1500)           SWALLOW EVALUATION (last 72 hours)       SLP Adult Swallow Evaluation       Row Name 08/29/23 1500       Rehab Evaluation    Document Type therapy note (daily note)  -EC    Subjective Information complains of;weakness  voice changes, shuffling gate  -EC    Patient Observations alert;cooperative;agree to therapy  -EC    Patient/Family/Caregiver Comments/Observations wife accompanies pt  -EC    Patient Effort good  -EC    Comment Pt alert and engaged throughout session today. VFSS results and recommendations reviewed with pt and pt's wife. Pt and wife state pt has not been drinking ntl, has been drinking thins. Rationale for  ntl explained in detail and further edu provided re: FWP. Handouts also provided re: aspiration precautions. Pt and wife express understanding. Pt and wife report difficulty w/solids at home; information on [facility] soft diet provided to pt and wife. Swallow exercises presented with modeling and rationale provided. Pt completes repetition of effortful swallow, pitch glide and supraglottic swallow. Pt to complete exercises daily at home.  -EC    Symptoms Noted During/After Treatment none  -EC       General Information    Patient Profile Reviewed yes  -EC    Pertinent History Of Current Problem Pt is a 73 y.o. male presenting for dysphagia therapy following VFSS 8/25/23 showing mild-moderate oral-pharyngeal dysphagia characerized by premature spillage, mildly disorganized oral transit, deep penetration to the voal cords and eventual silent aspiration of thin liquid. A regular diet with ntl was recommended. PMH includes EGD w/dilation approx 4-5 months ago, reflux, unexplained weight loss. Denies hx of head/neck CA, CVA, pna, or cervical spine surgery. Pt and wife report he was recently worked up for Parkinson's but was found to not have Parkinson's. They report they have been to numerous neurologists and specialists to identify cause of symptoms, including shuffling gate, dysphagia and vocal changes, with no specific diagnoses.  -EC    Current Method of Nutrition thin liquids;nectar/syrup-thick liquids;regular textures  ntl was recommended though pt states he has been drinking thins  -EC       SLP Evaluation Clinical Impression    SLP Swallowing Diagnosis mild-moderate;oral dysphagia;pharyngeal dysphagia  -EC    Functional Impact risk of aspiration/pneumonia;risk of dehydration  -EC    Rehab Potential/Prognosis, Swallowing good, to achieve stated therapy goals  -EC    Swallow Criteria for Skilled Therapeutic Interventions Met demonstrates skilled criteria  -EC       SLP Treatment Clinical Impressions    Care Plan Review  evaluation/treatment results reviewed;care plan/treatment goals reviewed;risks/benefits reviewed;patient/other agree to care plan  -EC    Care Plan Review, Other Participant(s) spouse  -EC       Recommendations    Therapy Frequency (Swallow) 1 day per week  -EC    Predicted Duration Therapy Intervention (Days) until discharge  per treating SLP discretion  -EC    SLP Diet Recommendation regular textures;nectar thick liquids  [place] Protocol  -EC    Recommended Diagnostics reassess via clinical swallow evaluation;reassess via VFSS ([facility] [place])  -EC    Recommended Precautions and Strategies upright posture during/after eating;small bites of food and sips of liquid;general aspiration precautions;reflux precautions  -EC    Oral Care Recommendations Oral Care BID/PRN  -EC    SLP Rec. for Method of Medication Administration meds whole;meds crushed;with thick liquids;with puree;as tolerated  -EC    Monitor for Signs of Aspiration yes;notify SLP if any concerns  -EC       Swallow Goals (SLP)    Swallow LTGs Swallow Long Term Goal (free text)  -EC    Swallow STGs pharyngeal strengthening exercise goal selection (SLP);swallow management recall goal selection (SLP)  -EC    Pharyngeal Strengthening Exercise Goal Selection (SLP) pharyngeal strengthening exercise, SLP goal 1  -EC    Swallow Management Recall Goal Selection (SLP) swallow management recall, SLP goal 1  -EC       (LTG) Swallow    (LTG) Swallow The patient will maximize swallow function for least restrictive po diet, exhibiting no complications associated with dysphagia, adequate po intake, and demonstrating independent use of safe swallow compensations.  -EC    [place] (Swallow Long Term Goal) with minimal cues (75-90% accuracy)  -EC    Time Frame (Swallow Long Term Goal) by discharge  -EC    Progress/Outcomes (Swallow Long Term Goal) good progress toward goal  -EC       (STG) Pharyngeal Strengthening Exercise Goal 1 (SLP)    Activity (Pharyngeal Strengthening  Goal 1, SLP) increase epiglottic inversion and retroflexion;increase tongue base retraction  -EC    Increase Epiglottic Inversion and Retroflexion supraglottic swallow;hard effortful swallow  -EC    Increase Tongue Base Retraction hiro  -EC    [place]/Accuracy (Pharyngeal Strengthening Goal 1, SLP) with minimal cues (75-90% accuracy)  -EC    Time Frame (Pharyngeal Strengthening Goal 1, SLP) by discharge  -EC    Progress/Outcomes (Pharyngeal Strengthening Goal 1, SLP) good progress toward goal  -EC       (STG) Swallow Management Recall Goal 1 (SLP)    Activity (Swallow Management Recall Goal 1, SLP) recall of;aspiration precautions;reflux precautions;fatigue precautions;safe diet/liquid level;compensatory swallow strategies/techniques;rationale for use of strategies/techniques  -EC    Progress/Outcomes (Swallow Management Recall Goal 1, SLP) good progress toward goal  -EC              User Key  (r) = Recorded By, (t) = Taken By, (c) = Cosigned By      Initials Name Effective Dates    EC Ashwini Solomon 06/16/21 -                     EDUCATION  The patient has been educated in the following areas:   Dysphagia (Swallowing Impairment).        SLP GOALS       Row Name 08/29/23 1500             (LTG) Swallow    (LTG) Swallow The patient will maximize swallow function for least restrictive po diet, exhibiting no complications associated with dysphagia, adequate po intake, and demonstrating independent use of safe swallow compensations.  -EC      [place] (Swallow Long Term Goal) with minimal cues (75-90% accuracy)  -EC      Time Frame (Swallow Long Term Goal) by discharge  -EC      Progress/Outcomes (Swallow Long Term Goal) good progress toward goal  -EC         (STG) Pharyngeal Strengthening Exercise Goal 1 (SLP)    Activity (Pharyngeal Strengthening Goal 1, SLP) increase epiglottic inversion and retroflexion;increase tongue base retraction  -EC      Increase Epiglottic Inversion and Retroflexion  supraglottic swallow;hard effortful swallow  -EC      Increase Tongue Base Retraction hiro  -EC      [place]/Accuracy (Pharyngeal Strengthening Goal 1, SLP) with minimal cues (75-90% accuracy)  -EC      Time Frame (Pharyngeal Strengthening Goal 1, SLP) by discharge  -EC      Progress/Outcomes (Pharyngeal Strengthening Goal 1, SLP) good progress toward goal  -EC         (STG) Swallow Management Recall Goal 1 (SLP)    Activity (Swallow Management Recall Goal 1, SLP) recall of;aspiration precautions;reflux precautions;fatigue precautions;safe diet/liquid level;compensatory swallow strategies/techniques;rationale for use of strategies/techniques  -EC      Progress/Outcomes (Swallow Management Recall Goal 1, SLP) good progress toward goal  -EC                User Key  (r) = Recorded By, (t) = Taken By, (c) = Cosigned By      Initials Name Provider Type    EC Ashwini Solomon Speech and Language Pathologist                       Time Calculation:                Ashwini Solomon  8/29/2023

## 2023-10-05 ENCOUNTER — OFFICE VISIT (OUTPATIENT)
Dept: CARDIOLOGY | Facility: CLINIC | Age: 73
End: 2023-10-05
Payer: MEDICARE

## 2023-10-05 VITALS
OXYGEN SATURATION: 99 % | DIASTOLIC BLOOD PRESSURE: 79 MMHG | HEIGHT: 63 IN | BODY MASS INDEX: 26.05 KG/M2 | HEART RATE: 70 BPM | SYSTOLIC BLOOD PRESSURE: 128 MMHG | WEIGHT: 147 LBS

## 2023-10-05 DIAGNOSIS — R94.31 ABNORMAL EKG: ICD-10-CM

## 2023-10-05 DIAGNOSIS — R06.02 SHORTNESS OF BREATH: ICD-10-CM

## 2023-10-05 DIAGNOSIS — I10 PRIMARY HYPERTENSION: Primary | ICD-10-CM

## 2023-10-05 DIAGNOSIS — G47.33 OBSTRUCTIVE SLEEP APNEA: ICD-10-CM

## 2023-10-05 DIAGNOSIS — E10.9 TYPE 1 DIABETES MELLITUS WITHOUT COMPLICATION: ICD-10-CM

## 2023-10-05 RX ORDER — AMANTADINE HYDROCHLORIDE 100 MG/1
100 CAPSULE, GELATIN COATED ORAL 4 TIMES DAILY
COMMUNITY
Start: 2023-07-20 | End: 2023-10-05

## 2023-10-05 RX ORDER — TRAMADOL HYDROCHLORIDE 50 MG/1
1 TABLET ORAL EVERY 6 HOURS PRN
COMMUNITY

## 2023-10-05 NOTE — PROGRESS NOTES
"    Subjective:     Encounter Date:10/05/2023      Patient ID: Richard Garcia is a 73 y.o. male.    Chief Complaint:  History of Present Illness 73-year-old white male with history of hypertension diabetes sleep apnea presents to my office for a new consultation.  Patient has been diagnosed with Parkinson's and was placed on medications after which she had some speech issues 2.  Patient has some shortness of breath with exertion radiograms any chest pains.  No PND orthopnea.  No palpitation but has some dizziness.  No syncope or swelling of the feet.  Patient is taking all her medicines regularly.  Patient does not smoke.  /79   Pulse 70   Ht 160 cm (62.99\")   Wt 66.7 kg (147 lb)   SpO2 99%   BMI 26.05 kg/m²     The following portions of the patient's history were reviewed and updated as appropriate: allergies, current medications, past family history, past medical history, past social history, past surgical history, and problem list.  Past Medical History:   Diagnosis Date    Diabetes mellitus type I, controlled     Dyslipidemia     Erectile dysfunction     Impression: viagra causes headache. Try cilais    Fatigue     Impression: likely multifactorial,Findings discussed. All questions answered. Differential diagnosis discussed. Follow-up after testing complete, sooner for worsening symptoms or any concerns. Recommend sleep study. Wife says pt snores significantly.    GERD (gastroesophageal reflux disease)     History of chicken pox     History of hepatitis C     Impression: s/p tx with harvoni    History of tobacco use     Medicare annual wellness visit, subsequent     with abnormal findings    Neck pain     Impression: left sided.    Nocturia     ABY (obstructive sleep apnea)     Impression: per sleep study. Pt recently bought new mattress, wants to see how he does with that.    Overweight (BMI 25.0-29.9)     Screening for alcoholism     Screening for depression     Vitamin D deficiency      Past " Surgical History:   Procedure Laterality Date    CATARACT EXTRACTION, BILATERAL      ENDOSCOPY      ENDOSCOPY N/A 2023    Procedure: ESOPHAGOGASTRODUODENOSCOPY WITH BIOPSY X2 AREAS  AND DILATATION (BOUGIE #54,#58);  Surgeon: Margarito Dorado MD;  Location: [facility] ENDOSCOPY;  Service: Gastroenterology;  Laterality: N/A;  POST: GASTRIC POLYPS AND DYSPHAGIA     Social History     Socioeconomic History    Marital status:    Tobacco Use    Smoking status: Former     Packs/day: 0.50     Years: 10.00     Pack years: 5.00     Types: Cigarettes     Quit date: 2011     Years since quittin.6     Passive exposure: Never    Smokeless tobacco: Never   Vaping Use    Vaping Use: Never used   Substance and Sexual Activity    Alcohol use: No    Drug use: No    Sexual activity: Defer     Family History   Problem Relation Age of Onset    Diabetes Mother         Diabetes Mellitus     Coronary artery disease Mother     Hypertension Mother     Heart attack Mother     Coronary artery disease Father     Hypertension Father     Emphysema Father     Pancreatic cancer Sister     Esophageal cancer Brother        Current Outpatient Medications:     acetaminophen (TYLENOL) 325 MG tablet, Take 2 tablets by mouth Every 6 (Six) Hours As Needed for Mild Pain., Disp: , Rfl:     aspirin 81 MG chewable tablet, Chew 1 tablet Daily As Needed., Disp: , Rfl:     azelastine (ASTELIN) 0.1 % nasal spray, INSTILL 2 SPRAYS IN EACH NOSTRIL TWICE DAILY AS DIRECTED BY PROVIDER, Disp: , Rfl:     carbidopa-levodopa CR (SINEMET CR)  MG per CR tablet, Take 1 tablet by mouth., Disp: , Rfl:     Carboxymethylcellul-Glycerin 0.5-0.9 % solution, Apply 1 drop to eye(s) as directed by provider Daily As Needed., Disp: , Rfl:     cholecalciferol (VITAMIN D3) 400 units tablet, Take 1 tablet by mouth Daily., Disp: , Rfl:     fluticasone (FLONASE) 50 MCG/ACT nasal spray, , Disp: , Rfl:     ibuprofen (ADVIL,MOTRIN) 200 MG tablet, Take 1 tablet by mouth  Every 6 (Six) Hours As Needed for Mild Pain., Disp: , Rfl:     insulin aspart (novoLOG) 100 UNIT/ML injection, Inject  under the skin into the appropriate area as directed Daily. Pump about 45-50 U per day, Disp: , Rfl:     lisinopril (PRINIVIL,ZESTRIL) 20 MG tablet, Take 1 tablet by mouth Daily., Disp: , Rfl:     omeprazole (priLOSEC) 20 MG capsule, Take 1 capsule by mouth Daily., Disp: , Rfl:     traMADol (ULTRAM) 50 MG tablet, Take 1 tablet by mouth Every 6 (Six) Hours As Needed., Disp: , Rfl:   Allergies   Allergen Reactions    Baclofen Hallucinations    Pramipexole Hallucinations    Pramipexole Dihydrochloride Hallucinations    Sildenafil Headache and Hives     Other reaction(s): Blurring of visual image    Vardenafil Headache, Hives and Photosensitivity       Review of Systems   Constitutional: Negative for malaise/fatigue.   Cardiovascular:  Negative for chest pain, dyspnea on exertion, leg swelling and palpitations.   Respiratory:  Positive for shortness of breath. Negative for cough.    Gastrointestinal:  Negative for abdominal pain, nausea and vomiting.   Neurological:  Positive for dizziness and light-headedness. Negative for focal weakness, headaches and numbness.   All other systems reviewed and are negative.           Objective:     Constitutional:       Appearance: Well-developed.   Eyes:      General: No scleral icterus.     Conjunctiva/sclera: Conjunctivae normal.   HENT:      Head: Normocephalic and atraumatic.   Neck:      Vascular: No carotid bruit or JVD.   Pulmonary:      Effort: Pulmonary effort is normal.      Breath sounds: Normal breath sounds. No wheezing. No rales.   Cardiovascular:      Normal rate. Regular rhythm.   Pulses:     Intact distal pulses.   Abdominal:      General: Bowel sounds are normal.      Palpations: Abdomen is soft.   Musculoskeletal:      Cervical back: Normal range of motion and neck supple. Skin:     General: Skin is warm and dry.      Findings: No rash.    Neurological:      Mental Status: Alert.         ECG 12 Lead    Date/Time: 10/5/2023 12:31 PM  Performed by: Luis Sweet MD  Authorized by: Luis Sweet MD   Comments: Sinus rhythm  First-degree block  Possible anterior MI  Abnormal EKG  No previous ECGs available        Lab Review:       Assessment:          Diagnosis Plan   1. Primary hypertension        2. Type 1 diabetes mellitus without complication        3. Shortness of breath        4. 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  Patient has Parkinson's disease and he was on treatment and patient's family stated that the treatment has caused him to have slurred speech and I have asked him to follow-up with his neurologist to rule out a stroke.

## 2023-10-10 ENCOUNTER — HOSPITAL ENCOUNTER (OUTPATIENT)
Dept: CARDIOLOGY | Facility: HOSPITAL | Age: 73
Discharge: HOME | End: 2023-10-10
Admitting: INTERNAL MEDICINE
Payer: MEDICARE

## 2023-10-10 VITALS
WEIGHT: 147 LBS | SYSTOLIC BLOOD PRESSURE: 166 MMHG | BODY MASS INDEX: 26.05 KG/M2 | HEIGHT: 63 IN | HEART RATE: 65 BPM | DIASTOLIC BLOOD PRESSURE: 83 MMHG

## 2023-10-10 DIAGNOSIS — G47.33 OBSTRUCTIVE SLEEP APNEA: ICD-10-CM

## 2023-10-10 DIAGNOSIS — E10.9 TYPE 1 DIABETES MELLITUS WITHOUT COMPLICATION: ICD-10-CM

## 2023-10-10 DIAGNOSIS — R94.31 ABNORMAL EKG: ICD-10-CM

## 2023-10-10 DIAGNOSIS — I10 PRIMARY HYPERTENSION: ICD-10-CM

## 2023-10-10 DIAGNOSIS — R06.02 SHORTNESS OF BREATH: ICD-10-CM

## 2023-10-10 LAB
BH CV ECHO MEAS - ACS: 1.97 CM
BH CV ECHO MEAS - AO MAX PG: 4.3 MMHG
BH CV ECHO MEAS - AO MEAN PG: 2.41 MMHG
BH CV ECHO MEAS - AO ROOT DIAM: 3.3 CM
BH CV ECHO MEAS - AO V2 MAX: 103.4 CM/SEC
BH CV ECHO MEAS - AO V2 VTI: 23.8 CM
BH CV ECHO MEAS - AVA(I,D): 1.81 CM2
BH CV ECHO MEAS - EDV(CUBED): 54.9 ML
BH CV ECHO MEAS - EDV(MOD-SP4): 45.9 ML
BH CV ECHO MEAS - EF(MOD-BP): 58 %
BH CV ECHO MEAS - EF(MOD-SP4): 58.4 %
BH CV ECHO MEAS - ESV(CUBED): 16.4 ML
BH CV ECHO MEAS - ESV(MOD-SP4): 19.1 ML
BH CV ECHO MEAS - FS: 33.2 %
BH CV ECHO MEAS - IVS/LVPW: 1.07 CM
BH CV ECHO MEAS - IVSD: 1.06 CM
BH CV ECHO MEAS - LA DIMENSION: 3.4 CM
BH CV ECHO MEAS - LV MASS(C)D: 120.6 GRAMS
BH CV ECHO MEAS - LV MAX PG: 1.66 MMHG
BH CV ECHO MEAS - LV MEAN PG: 1.04 MMHG
BH CV ECHO MEAS - LV V1 MAX: 64.5 CM/SEC
BH CV ECHO MEAS - LV V1 VTI: 14.6 CM
BH CV ECHO MEAS - LVIDD: 3.8 CM
BH CV ECHO MEAS - LVIDS: 2.5 CM
BH CV ECHO MEAS - LVOT AREA: 2.9 CM2
BH CV ECHO MEAS - LVOT DIAM: 1.94 CM
BH CV ECHO MEAS - LVPWD: 0.98 CM
BH CV ECHO MEAS - MV A MAX VEL: 57.1 CM/SEC
BH CV ECHO MEAS - MV DEC SLOPE: 210 CM/SEC2
BH CV ECHO MEAS - MV DEC TIME: 0.28 SEC
BH CV ECHO MEAS - MV E MAX VEL: 59.7 CM/SEC
BH CV ECHO MEAS - MV E/A: 1.04
BH CV ECHO MEAS - MV MAX PG: 2.7 MMHG
BH CV ECHO MEAS - MV MEAN PG: 1.14 MMHG
BH CV ECHO MEAS - MV V2 VTI: 27.6 CM
BH CV ECHO MEAS - MVA(VTI): 1.56 CM2
BH CV ECHO MEAS - PA V2 MAX: 86.6 CM/SEC
BH CV ECHO MEAS - PI END-D VEL: 48.6 CM/SEC
BH CV ECHO MEAS - RAP SYSTOLE: 3 MMHG
BH CV ECHO MEAS - RV MAX PG: 0.84 MMHG
BH CV ECHO MEAS - RV V1 MAX: 45.7 CM/SEC
BH CV ECHO MEAS - RV V1 VTI: 10.2 CM
BH CV ECHO MEAS - RVSP: 15.5 MMHG
BH CV ECHO MEAS - SV(LVOT): 43.1 ML
BH CV ECHO MEAS - SV(MOD-SP4): 26.8 ML
BH CV ECHO MEAS - TR MAX PG: 12.5 MMHG
BH CV ECHO MEAS - TR MAX VEL: 176.5 CM/SEC

## 2023-10-10 PROCEDURE — 93306 TTE W/DOPPLER COMPLETE: CPT | Performed by: INTERNAL MEDICINE

## 2023-10-10 PROCEDURE — 93306 TTE W/DOPPLER COMPLETE: CPT

## 2023-10-20 ENCOUNTER — TELEPHONE (OUTPATIENT)
Dept: CARDIOLOGY | Facility: CLINIC | Age: 73
End: 2023-10-20
Payer: MEDICARE

## 2023-10-20 DIAGNOSIS — R94.31 ABNORMAL EKG: Primary | ICD-10-CM

## 2023-10-20 NOTE — TELEPHONE ENCOUNTER
Called patient to discuss EKG results.  He was not home and his wife answered the phone so discussed EKG and plan of care with her.  Per Dr. Sweet's LOV note in presence of normal echocardiogram he would like to move forward with Myoview study instead of cardiac cath to evaluate for ischemia based on EKG.     Will place order, please call patient and schedule at next availability for both office and patient.

## 2023-10-20 NOTE — TELEPHONE ENCOUNTER
Patient is calling about results.    Adult Transthoracic Echo Complete W/ Cont if Necessary Per Protocol (10/10/2023 10:54)     [phone]

## 2023-10-20 NOTE — TELEPHONE ENCOUNTER
Please call patient and let him know echocardiogram results are normal.  He has a normal ejection fraction of 56 to 60% which means his heart is working well as above.  There are no structural abnormalities with any other chambers in his heart.  He does have mild mitral valve regurgitation but this is not of concern and something we will monitor in the future as the scale is mild to moderate to severe.  With good blood pressure and heart rate control valvular regurgitation will likely not worsen.  Thank you let me know if he has any further questions or concerns.

## 2023-10-20 NOTE — TELEPHONE ENCOUNTER
Called and spoke to the patient and verbally stated his echo results were normal ejection fraction of 56 to 60% which means his heart is working well as above. Patient understood but,  -patient is wanting a call and go over as to why his EKG results were abnormal-    [phone]

## 2023-10-24 ENCOUNTER — HOSPITAL ENCOUNTER (OUTPATIENT)
Dept: CARDIOLOGY | Facility: HOSPITAL | Age: 73
Discharge: HOME | End: 2023-10-24
Payer: MEDICARE

## 2023-10-24 DIAGNOSIS — R94.31 ABNORMAL EKG: ICD-10-CM

## 2023-10-24 LAB
BH CV REST NUCLEAR ISOTOPE DOSE: 10.4 MCI
BH CV STRESS COMMENTS STAGE 1: NORMAL
BH CV STRESS DOSE REGADENOSON STAGE 1: 0.4
BH CV STRESS DURATION MIN STAGE 1: 0
BH CV STRESS DURATION SEC STAGE 1: 10
BH CV STRESS NUCLEAR ISOTOPE DOSE: 33.6 MCI
BH CV STRESS PROTOCOL 1: NORMAL
BH CV STRESS RECOVERY BP: NORMAL MMHG
BH CV STRESS RECOVERY HR: 87 BPM
BH CV STRESS STAGE 1: 1
LV EF NUC BP: 98 %
MAXIMAL PREDICTED HEART RATE: 146 BPM
STRESS BASELINE BP: NORMAL MMHG
STRESS BASELINE HR: 70 BPM
STRESS TARGET HR: 124 BPM

## 2023-10-24 PROCEDURE — 0 TECHNETIUM SESTAMIBI: Performed by: INTERNAL MEDICINE

## 2023-10-24 PROCEDURE — 93017 CV STRESS TEST TRACING ONLY: CPT

## 2023-10-24 PROCEDURE — 93016 CV STRESS TEST SUPVJ ONLY: CPT | Performed by: NURSE PRACTITIONER

## 2023-10-24 PROCEDURE — 78452 HT MUSCLE IMAGE SPECT MULT: CPT | Performed by: INTERNAL MEDICINE

## 2023-10-24 PROCEDURE — 93018 CV STRESS TEST I&R ONLY: CPT | Performed by: INTERNAL MEDICINE

## 2023-10-24 PROCEDURE — A9500 TC99M SESTAMIBI: HCPCS | Performed by: INTERNAL MEDICINE

## 2023-10-24 PROCEDURE — 25010000002 REGADENOSON 0.4 MG/5ML SOLUTION: Performed by: INTERNAL MEDICINE

## 2023-10-24 PROCEDURE — 78452 HT MUSCLE IMAGE SPECT MULT: CPT

## 2023-10-24 RX ORDER — REGADENOSON 0.08 MG/ML
0.4 INJECTION, SOLUTION INTRAVENOUS
Status: COMPLETED | OUTPATIENT
Start: 2023-10-24 | End: 2023-10-24

## 2023-10-24 RX ADMIN — TECHNETIUM TC 99M SESTAMIBI 1 DOSE: 1 INJECTION INTRAVENOUS at 13:25

## 2023-10-24 RX ADMIN — TECHNETIUM TC 99M SESTAMIBI 1 DOSE: 1 INJECTION INTRAVENOUS at 12:13

## 2023-10-24 RX ADMIN — REGADENOSON 0.4 MG: 0.08 INJECTION, SOLUTION INTRAVENOUS at 13:25

## 2023-12-11 ENCOUNTER — HOSPITAL ENCOUNTER (OUTPATIENT)
Facility: HOSPITAL | Age: 73
Setting detail: OBSERVATION
Discharge: HOME | End: 2023-12-12
Attending: EMERGENCY MEDICINE | Admitting: STUDENT IN AN ORGANIZED HEALTH CARE EDUCATION/TRAINING PROGRAM
Payer: OTHER GOVERNMENT

## 2023-12-11 ENCOUNTER — APPOINTMENT (OUTPATIENT)
Dept: GENERAL RADIOLOGY | Facility: HOSPITAL | Age: 73
End: 2023-12-11
Payer: OTHER GOVERNMENT

## 2023-12-11 DIAGNOSIS — R79.89 ELEVATED TROPONIN: Primary | ICD-10-CM

## 2023-12-11 DIAGNOSIS — R05.1 ACUTE COUGH: ICD-10-CM

## 2023-12-11 DIAGNOSIS — R53.1 GENERALIZED WEAKNESS: ICD-10-CM

## 2023-12-11 LAB
ALBUMIN SERPL-MCNC: 3.8 G/DL (ref 3.5–5.2)
ALBUMIN/GLOB SERPL: 1.7 G/DL
ALP SERPL-CCNC: 62 U/L (ref 39–117)
ALT SERPL W P-5'-P-CCNC: 18 U/L (ref 1–41)
ANION GAP SERPL CALCULATED.3IONS-SCNC: 5.1 MMOL/L (ref 5–15)
AST SERPL-CCNC: 18 U/L (ref 1–40)
BASOPHILS # BLD AUTO: 0.04 10*3/MM3 (ref 0–0.2)
BASOPHILS NFR BLD AUTO: 0.7 % (ref 0–1.5)
BILIRUB SERPL-MCNC: 1.2 MG/DL (ref 0–1.2)
BILIRUB UR QL STRIP: NEGATIVE
BUN SERPL-MCNC: 51 MG/DL (ref 8–23)
BUN/CREAT SERPL: 33.1 (ref 7–25)
CALCIUM SPEC-SCNC: 8.6 MG/DL (ref 8.6–10.5)
CHLORIDE SERPL-SCNC: 101 MMOL/L (ref 98–107)
CLARITY UR: CLEAR
CO2 SERPL-SCNC: 28.9 MMOL/L (ref 22–29)
COLOR UR: ABNORMAL
CREAT SERPL-MCNC: 1.54 MG/DL (ref 0.76–1.27)
DEPRECATED RDW RBC AUTO: 44.8 FL (ref 37–54)
EGFRCR SERPLBLD CKD-EPI 2021: 47 ML/MIN/1.73
EOSINOPHIL # BLD AUTO: 0.03 10*3/MM3 (ref 0–0.4)
EOSINOPHIL NFR BLD AUTO: 0.5 % (ref 0.3–6.2)
ERYTHROCYTE [DISTWIDTH] IN BLOOD BY AUTOMATED COUNT: 12.9 % (ref 12.3–15.4)
GLOBULIN UR ELPH-MCNC: 2.2 GM/DL
GLUCOSE BLDC GLUCOMTR-MCNC: 184 MG/DL (ref 70–130)
GLUCOSE SERPL-MCNC: 148 MG/DL (ref 65–99)
GLUCOSE UR STRIP-MCNC: NEGATIVE MG/DL
HCT VFR BLD AUTO: 39.7 % (ref 37.5–51)
HGB BLD-MCNC: 12.4 G/DL (ref 13–17.7)
HGB UR QL STRIP.AUTO: NEGATIVE
IMM GRANULOCYTES # BLD AUTO: 0 10*3/MM3 (ref 0–0.05)
IMM GRANULOCYTES NFR BLD AUTO: 0 % (ref 0–0.5)
KETONES UR QL STRIP: NEGATIVE
LEUKOCYTE ESTERASE UR QL STRIP.AUTO: NEGATIVE
LYMPHOCYTES # BLD AUTO: 1.05 10*3/MM3 (ref 0.7–3.1)
LYMPHOCYTES NFR BLD AUTO: 17.4 % (ref 19.6–45.3)
MAGNESIUM SERPL-MCNC: 1.9 MG/DL (ref 1.6–2.4)
MCH RBC QN AUTO: 29.6 PG (ref 26.6–33)
MCHC RBC AUTO-ENTMCNC: 31.2 G/DL (ref 31.5–35.7)
MCV RBC AUTO: 94.7 FL (ref 79–97)
MONOCYTES # BLD AUTO: 0.43 10*3/MM3 (ref 0.1–0.9)
MONOCYTES NFR BLD AUTO: 7.1 % (ref 5–12)
NEUTROPHILS NFR BLD AUTO: 4.49 10*3/MM3 (ref 1.7–7)
NEUTROPHILS NFR BLD AUTO: 74.3 % (ref 42.7–76)
NITRITE UR QL STRIP: NEGATIVE
PH UR STRIP.AUTO: <=5 [PH] (ref 5–8)
PLATELET # BLD AUTO: 227 10*3/MM3 (ref 140–450)
PMV BLD AUTO: 11.8 FL (ref 6–12)
POTASSIUM SERPL-SCNC: 3.9 MMOL/L (ref 3.5–5.2)
PROT SERPL-MCNC: 6 G/DL (ref 6–8.5)
PROT UR QL STRIP: ABNORMAL
RBC # BLD AUTO: 4.19 10*6/MM3 (ref 4.14–5.8)
SODIUM SERPL-SCNC: 135 MMOL/L (ref 136–145)
SP GR UR STRIP: 1.02 (ref 1–1.03)
TROPONIN T SERPL HS-MCNC: 42 NG/L
TROPONIN T SERPL HS-MCNC: 43 NG/L
UROBILINOGEN UR QL STRIP: ABNORMAL
WBC NRBC COR # BLD AUTO: 6.04 10*3/MM3 (ref 3.4–10.8)

## 2023-12-11 PROCEDURE — 84484 ASSAY OF TROPONIN QUANT: CPT

## 2023-12-11 PROCEDURE — 83735 ASSAY OF MAGNESIUM: CPT

## 2023-12-11 PROCEDURE — 99284 EMERGENCY DEPT VISIT MOD MDM: CPT

## 2023-12-11 PROCEDURE — 81003 URINALYSIS AUTO W/O SCOPE: CPT

## 2023-12-11 PROCEDURE — G0378 HOSPITAL OBSERVATION PER HR: HCPCS

## 2023-12-11 PROCEDURE — 25810000003 SODIUM CHLORIDE 0.9 % SOLUTION

## 2023-12-11 PROCEDURE — 93010 ELECTROCARDIOGRAM REPORT: CPT

## 2023-12-11 PROCEDURE — 99283 EMERGENCY DEPT VISIT LOW MDM: CPT

## 2023-12-11 PROCEDURE — 71046 X-RAY EXAM CHEST 2 VIEWS: CPT

## 2023-12-11 PROCEDURE — 82948 REAGENT STRIP/BLOOD GLUCOSE: CPT

## 2023-12-11 PROCEDURE — 85025 COMPLETE CBC W/AUTO DIFF WBC: CPT

## 2023-12-11 PROCEDURE — 80053 COMPREHEN METABOLIC PANEL: CPT

## 2023-12-11 PROCEDURE — 93005 ELECTROCARDIOGRAM TRACING: CPT

## 2023-12-11 PROCEDURE — 96374 THER/PROPH/DIAG INJ IV PUSH: CPT

## 2023-12-11 PROCEDURE — 25010000002 ONDANSETRON PER 1 MG

## 2023-12-11 RX ORDER — ONDANSETRON 2 MG/ML
4 INJECTION INTRAMUSCULAR; INTRAVENOUS ONCE
Status: COMPLETED | OUTPATIENT
Start: 2023-12-11 | End: 2023-12-11

## 2023-12-11 RX ORDER — SODIUM CHLORIDE 9 MG/ML
100 INJECTION, SOLUTION INTRAVENOUS CONTINUOUS
Status: DISCONTINUED | OUTPATIENT
Start: 2023-12-12 | End: 2023-12-12 | Stop reason: HOSPADM

## 2023-12-11 RX ORDER — IBUPROFEN 600 MG/1
1 TABLET ORAL
Status: DISCONTINUED | OUTPATIENT
Start: 2023-12-11 | End: 2023-12-12 | Stop reason: HOSPADM

## 2023-12-11 RX ORDER — SODIUM CHLORIDE 0.9 % (FLUSH) 0.9 %
10 SYRINGE (ML) INJECTION AS NEEDED
Status: DISCONTINUED | OUTPATIENT
Start: 2023-12-11 | End: 2023-12-12 | Stop reason: HOSPADM

## 2023-12-11 RX ORDER — PANTOPRAZOLE SODIUM 40 MG/1
40 TABLET, DELAYED RELEASE ORAL
Status: DISCONTINUED | OUTPATIENT
Start: 2023-12-12 | End: 2023-12-12 | Stop reason: HOSPADM

## 2023-12-11 RX ORDER — NICOTINE POLACRILEX 4 MG
15 LOZENGE BUCCAL
Status: DISCONTINUED | OUTPATIENT
Start: 2023-12-11 | End: 2023-12-12 | Stop reason: HOSPADM

## 2023-12-11 RX ORDER — DEXTROSE MONOHYDRATE 25 G/50ML
25 INJECTION, SOLUTION INTRAVENOUS
Status: DISCONTINUED | OUTPATIENT
Start: 2023-12-11 | End: 2023-12-12 | Stop reason: HOSPADM

## 2023-12-11 RX ORDER — ASPIRIN 81 MG/1
324 TABLET, CHEWABLE ORAL ONCE
Status: COMPLETED | OUTPATIENT
Start: 2023-12-11 | End: 2023-12-11

## 2023-12-11 RX ORDER — ASPIRIN 81 MG/1
81 TABLET, CHEWABLE ORAL DAILY
Status: DISCONTINUED | OUTPATIENT
Start: 2023-12-12 | End: 2023-12-12 | Stop reason: HOSPADM

## 2023-12-11 RX ORDER — ACETAMINOPHEN 325 MG/1
650 TABLET ORAL EVERY 6 HOURS PRN
Status: DISCONTINUED | OUTPATIENT
Start: 2023-12-11 | End: 2023-12-12 | Stop reason: HOSPADM

## 2023-12-11 RX ADMIN — ONDANSETRON 4 MG: 2 INJECTION INTRAMUSCULAR; INTRAVENOUS at 18:08

## 2023-12-11 RX ADMIN — SODIUM CHLORIDE 1000 ML: 9 INJECTION, SOLUTION INTRAVENOUS at 18:54

## 2023-12-11 RX ADMIN — ASPIRIN 81 MG CHEWABLE TABLET 324 MG: 81 TABLET CHEWABLE at 19:47

## 2023-12-11 NOTE — FSED PROVIDER NOTE
[facility] ED / URGENT CARE    EMERGENCY DEPARTMENT ENCOUNTER    Room Number:  302/1  Date seen:  12/13/2023  Time seen: 17:55 EST  PCP: Lopez Ortega FNP  Historian: Patient and wife    HPI:  Chief complaint: Cough  Context:Richard Garcia is a 73 y.o. male who presents to the ED with c/o cough.  Patient's wife reports that the patient was diagnosed with COVID on 12 2.  Reports that he was sent home with a Z-Marcellus and Tessalon Perles.  She reports last night he started to get worse.  She reports he was having nausea and vomiting and diarrhea.  She reports that she felt like his cough was worse as well.  He reports that he has had some shortness of breath.  He denies any nausea vomiting or diarrhea today.  She does report that he has a history of type 1 diabetes and has an insulin pump.  She denies any urinary symptoms.  She denies any known fever.  She reports that the patient does have some issues with balance that they are trying to diagnose but she does feel like he has more generalized weakness than normal.  Patient's wife reports that the cough is productive in nature.    Timing: Constant  Duration: Since December 2  Location: [facility]  Radiation: Nonradiating  Quality: Weakness  Intensity/Severity: Mild  Associated Symptoms: Nausea vomiting diarrhea, cough, COVID, generalized weakness  Aggravating Factors: No known aggravating  Alleviating Factors: No known alleviating      MEDICAL RECORD REVIEW  GERD, type I diabetic, hypertension, dyslipidemia    ALLERGIES  Baclofen, Pramipexole, Pramipexole dihydrochloride, Sildenafil, and Vardenafil    PAST MEDICAL HISTORY  Active Ambulatory Problems     Diagnosis Date Noted    Dyslipidemia 06/21/2019    Erectile dysfunction 06/21/2019    Fatigue 06/21/2019    GERD (gastroesophageal reflux disease) 06/21/2019    Hepatitis C 07/12/2012    Neuropathy 06/05/2013    Nocturia 06/21/2019    ABY (obstructive sleep apnea) 06/21/2019    Other specified  disorders of Eustachian tube, unspecified ear 2012    Polyosteoarthritis 2013    Pulmonary nodule 2015    Type 1 diabetes mellitus 04/15/2016    Vitamin D deficiency 2019    Hyperglycemia 10/02/2019     Resolved Ambulatory Problems     Diagnosis Date Noted    No Resolved Ambulatory Problems     Past Medical History:   Diagnosis Date    Diabetes mellitus type I, controlled     History of chicken pox     History of hepatitis C     History of tobacco use     Hypertension     Medicare annual wellness visit, subsequent     Neck pain     Overweight (BMI 25.0-29.9)     Screening for alcoholism     Screening for depression        PAST SURGICAL HISTORY  Past Surgical History:   Procedure Laterality Date    CATARACT EXTRACTION, BILATERAL      ENDOSCOPY      ENDOSCOPY N/A 2023    Procedure: ESOPHAGOGASTRODUODENOSCOPY WITH BIOPSY X2 AREAS  AND DILATATION (BOUGIE #54,#58);  Surgeon: Margarito Dorado MD;  Location: [facility] ENDOSCOPY;  Service: Gastroenterology;  Laterality: N/A;  POST: GASTRIC POLYPS AND DYSPHAGIA       FAMILY HISTORY  Family History   Problem Relation Age of Onset    Diabetes Mother         Diabetes Mellitus     Coronary artery disease Mother     Hypertension Mother     Heart attack Mother     Coronary artery disease Father     Hypertension Father     Emphysema Father     Pancreatic cancer Sister     Esophageal cancer Brother        SOCIAL HISTORY  Social History     Socioeconomic History    Marital status:    Tobacco Use    Smoking status: Former     Packs/day: 1.00     Years: 40.00     Additional pack years: 0.00     Total pack years: 40.00     Types: Cigarettes     Quit date: 2011     Years since quittin.8     Passive exposure: Past    Smokeless tobacco: Never   Vaping Use    Vaping Use: Never used   Substance and Sexual Activity    Alcohol use: No    Drug use: No    Sexual activity: Defer       REVIEW OF SYSTEMS  Review of Systems    All systems reviewed and  negative except for those discussed in HPI.     PHYSICAL EXAM    I have reviewed the triage vital signs and nursing notes.    ED Triage Vitals [12/11/23 1702]   Temp Heart Rate Resp BP SpO2   97.4 °F (36.3 °C) 78 18 109/53 98 %      Temp src Heart Rate Source Patient Position BP Location FiO2 (%)   Oral Monitor Sitting Left arm --       Physical Exam  Constitutional:       General: He is awake. He is not in acute distress.     Appearance: Normal appearance. He is not toxic-appearing.   HENT:      Nose: Nose normal.      Mouth/Throat:      Mouth: Mucous membranes are moist.   Eyes:      Pupils: Pupils are equal, round, and reactive to light.   Cardiovascular:      Rate and Rhythm: Normal rate and regular rhythm.      Pulses: Normal pulses.      Heart sounds: Normal heart sounds.   Pulmonary:      Effort: Pulmonary effort is normal.      Breath sounds: Normal breath sounds.   Abdominal:      General: Bowel sounds are normal.      Palpations: Abdomen is soft.      Tenderness: There is no abdominal tenderness. There is no guarding or rebound.   Musculoskeletal:      Cervical back: Normal range of motion.      Comments: Generalized weakness   Skin:     General: Skin is warm.   Neurological:      General: No focal deficit present.      Mental Status: He is alert.   Psychiatric:         Mood and Affect: Mood normal.         Behavior: Behavior normal. Behavior is cooperative.         Vital signs and nursing notes reviewed.        LAB RESULTS  Recent Results (from the past 24 hour(s))   POC Glucose Once    Collection Time: 12/12/23 11:14 AM    Specimen: Blood   Result Value Ref Range    Glucose 164 (H) 70 - 105 mg/dL       Ordered the above labs and independently reviewed the results.      RADIOLOGY RESULTS  No Radiology Exams Resulted Within Past 24 Hours       I ordered the above noted radiological studies. Independently reviewed by me and discussed with radiologist.  See dictation above for official radiology  interpretation.      Orders placed during this visit:  Orders Placed This Encounter   Procedures    XR Chest 2 View    Comprehensive Metabolic Panel    Single High Sensitivity Troponin T    Magnesium    Urinalysis With Culture If Indicated -    CBC Auto Differential    Single High Sensitivity Troponin T    BNP    Undress & Gown    Continuous Pulse Oximetry    Vital Signs    Discharge Follow-up with PCP    Diet: Regular/House Diet; Regular Texture (IDDSI 7); Thin (IDDSI 0)    Inpatient Diabetes Educator Consult    Inpatient Diabetes Educator Consult    PT Plan of Care Cert / Re-Cert    POC Glucose Once    POC Glucose Once    POC Glucose Once    POC Glucose Once    POC Glucose Once    ECG 12 Lead ED Triage Standing Order; Weak / Dizzy / AMS    SCANNED - TELEMETRY      SCANNED - TELEMETRY      Initiate Observation Status    Discharge patient    CBC & Differential    ED Acknowledgement Form Needed;           PROCEDURES    Procedures        MEDICATIONS GIVEN IN ER    Medications   ondansetron (ZOFRAN) injection 4 mg (4 mg Intravenous Given 12/11/23 1808)   sodium chloride 0.9 % bolus 1,000 mL (1,000 mL Intravenous New Bag 12/11/23 1854)   aspirin chewable tablet 324 mg (324 mg Oral Given 12/11/23 1947)         PROGRESS, DATA ANALYSIS, CONSULTS, AND MEDICAL DECISION MAKING    All labs have been independently reviewed by me.  All radiology studies have been reviewed by me.   EKG's independently reviewed by me.  Discussion below represents my analysis of pertinent findings related to patient's condition, differential diagnosis, treatment plan and final disposition.    I rechecked the patient.  I discussed the patient's labs, radiology findings (including all incidental findings), diagnosis, and plan for discharge.  A repeat exam reveals no new worrisome changes from my initial exam findings.  The patient understands that the fact that they are being discharged does not denote that nothing is abnormal, it indicates that no  clinical emergency is present and that they must follow-up as directed in order to properly maintain their health.  Follow-up instructions (specifically listed below) and return to ER precautions were given at this time.  I specifically instructed the patient to follow-up with their PCP.  The patient understands and agrees with the plan, and is ready for discharge.  All questions answered.    ED Course as of 12/13/23 0859   Mon Dec 11, 2023   1658 First look: pt with known covid 19, yesterday started n/v/d.   [EW]   1807 XR Chest 2 View  Per my independent interpretation there is no infiltrates, pneumonia noted [KJ]   1853 BUN(!): 51 [KJ]   1853 Creatinine(!): 1.54 [KJ]   1853 HS Troponin T(!): 43 [KJ]   2014 Hospitalist paged [KJ]   4018 Discussed admission with Dr. Rodrigez.  He agrees to admit.  [EW]      ED Course User Index  [EW] Kaylee Tirado, PORTIA  [KJ] Fior Flores, PORTIA       AS OF 08:59 EST VITALS:    BP - 118/58  HR - 77  TEMP - 98.1 °F (36.7 °C) (Oral)  02 SATS - 99%    Medical Decision Making  MEDICAL DECISION  Lab interpretation:  Labs viewed by me significant for, elevated troponin    This patient presents with generalized weakness likely secondary to COVID, nausea vomiting diarrhea.  Doubt intrinsic renal dysfunction or obstructive nephropathy. Considered alternate etiologies of the patient's symptoms including infectious processes, severe metabolic derangements or electrolyte abnormalities, heart failure, and intracranial/central processes but think these are unlikely given the history and physical exam.  Patient was noted to have an elevated troponin.  It went from 43-42.  The patient did have some nausea vomiting diarrhea yesterday.  The wife reports that he is improved from yesterday but continues to have generalized weakness.  I think the patient would best benefit from inpatient admission.  I spoke with Dr. Rodrigez regarding admission.    Problems Addressed:  Acute cough: complicated  acute illness or injury  Elevated troponin: complicated acute illness or injury  Generalized weakness: complicated acute illness or injury    Amount and/or Complexity of Data Reviewed  Labs: ordered. Decision-making details documented in ED Course.  Radiology: ordered. Decision-making details documented in ED Course.  ECG/medicine tests: ordered.    Risk  OTC drugs.  Prescription drug management.  Decision regarding hospitalization.          DIAGNOSIS  Final diagnoses:   Elevated troponin   Generalized weakness   Acute cough       New Medications Ordered This Visit   Medications    ondansetron (ZOFRAN) injection 4 mg    sodium chloride 0.9 % bolus 1,000 mL    aspirin chewable tablet 324 mg           I performed hand hygiene on entry into the pt room and upon exit.     Note Disclaimer: At [facility], we believe that sharing information builds trust and better relationships. You are receiving this note because you recently visited [facility]. It is possible you will see health information before a provider has talked with you about it. This kind of information can be easy to misunderstand. To help you fully understand what it means for your health, we urge you to discuss this note with your provider.         Part of this note may be an electronic transcription/translation of spoken language to printed text using the Dragon Dictation System.     Appropriate PPE worn during exam.    Dictated utilizing Dragon dictation     Note Disclaimer: At [facility], we believe that sharing information builds trust and better relationships. You are receiving this note because you recently visited [facility]. It is possible you will see health information before a provider has talked with you about it. This kind of information can be easy to misunderstand. To help you fully understand what it means for your health, we urge you to discuss this note with your provider.

## 2023-12-11 NOTE — ED NOTES
Pt states that he was diagnosed with Covid on Dec 2, states that he was given an antibiotic and tessalon pearls.  Wife states that he was seeming to get better, but yesterday started to have some nausea and vomiting yesterday and not feeling well.

## 2023-12-12 VITALS
BODY MASS INDEX: 27.46 KG/M2 | OXYGEN SATURATION: 99 % | DIASTOLIC BLOOD PRESSURE: 58 MMHG | HEART RATE: 77 BPM | TEMPERATURE: 98.1 F | WEIGHT: 155 LBS | SYSTOLIC BLOOD PRESSURE: 118 MMHG | RESPIRATION RATE: 17 BRPM | HEIGHT: 63 IN

## 2023-12-12 LAB
ANION GAP SERPL CALCULATED.3IONS-SCNC: 9 MMOL/L (ref 5–15)
BUN SERPL-MCNC: 37 MG/DL (ref 8–23)
BUN/CREAT SERPL: 36.6 (ref 7–25)
CALCIUM SPEC-SCNC: 8 MG/DL (ref 8.6–10.5)
CHLORIDE SERPL-SCNC: 104 MMOL/L (ref 98–107)
CO2 SERPL-SCNC: 25 MMOL/L (ref 22–29)
CREAT SERPL-MCNC: 1.01 MG/DL (ref 0.76–1.27)
DEPRECATED RDW RBC AUTO: 41.1 FL (ref 37–54)
EGFRCR SERPLBLD CKD-EPI 2021: 78 ML/MIN/1.73
ERYTHROCYTE [DISTWIDTH] IN BLOOD BY AUTOMATED COUNT: 13 % (ref 12.3–15.4)
GLUCOSE BLDC GLUCOMTR-MCNC: 109 MG/DL (ref 70–105)
GLUCOSE BLDC GLUCOMTR-MCNC: 120 MG/DL (ref 70–105)
GLUCOSE BLDC GLUCOMTR-MCNC: 164 MG/DL (ref 70–105)
GLUCOSE SERPL-MCNC: 140 MG/DL (ref 65–99)
HCT VFR BLD AUTO: 34.1 % (ref 37.5–51)
HGB BLD-MCNC: 11.3 G/DL (ref 13–17.7)
MCH RBC QN AUTO: 30.1 PG (ref 26.6–33)
MCHC RBC AUTO-ENTMCNC: 33.1 G/DL (ref 31.5–35.7)
MCV RBC AUTO: 91 FL (ref 79–97)
NT-PROBNP SERPL-MCNC: 63.2 PG/ML (ref 0–900)
PLATELET # BLD AUTO: 185 10*3/MM3 (ref 140–450)
PMV BLD AUTO: 10.3 FL (ref 6–12)
POTASSIUM SERPL-SCNC: 3.8 MMOL/L (ref 3.5–5.2)
QT INTERVAL: 349 MS
QTC INTERVAL: 389 MS
RBC # BLD AUTO: 3.74 10*6/MM3 (ref 4.14–5.8)
SODIUM SERPL-SCNC: 138 MMOL/L (ref 136–145)
WBC NRBC COR # BLD AUTO: 4.8 10*3/MM3 (ref 3.4–10.8)

## 2023-12-12 PROCEDURE — 96361 HYDRATE IV INFUSION ADD-ON: CPT

## 2023-12-12 PROCEDURE — 97162 PT EVAL MOD COMPLEX 30 MIN: CPT | Performed by: PHYSICAL THERAPIST

## 2023-12-12 PROCEDURE — 80048 BASIC METABOLIC PNL TOTAL CA: CPT | Performed by: STUDENT IN AN ORGANIZED HEALTH CARE EDUCATION/TRAINING PROGRAM

## 2023-12-12 PROCEDURE — 82948 REAGENT STRIP/BLOOD GLUCOSE: CPT

## 2023-12-12 PROCEDURE — 83880 ASSAY OF NATRIURETIC PEPTIDE: CPT | Performed by: STUDENT IN AN ORGANIZED HEALTH CARE EDUCATION/TRAINING PROGRAM

## 2023-12-12 PROCEDURE — 85027 COMPLETE CBC AUTOMATED: CPT | Performed by: STUDENT IN AN ORGANIZED HEALTH CARE EDUCATION/TRAINING PROGRAM

## 2023-12-12 PROCEDURE — 25810000003 SODIUM CHLORIDE 0.9 % SOLUTION: Performed by: STUDENT IN AN ORGANIZED HEALTH CARE EDUCATION/TRAINING PROGRAM

## 2023-12-12 PROCEDURE — G0378 HOSPITAL OBSERVATION PER HR: HCPCS

## 2023-12-12 RX ADMIN — SODIUM CHLORIDE 100 ML/HR: 9 INJECTION, SOLUTION INTRAVENOUS at 00:15

## 2023-12-12 RX ADMIN — SODIUM CHLORIDE 100 ML/HR: 9 INJECTION, SOLUTION INTRAVENOUS at 09:03

## 2023-12-12 RX ADMIN — PANTOPRAZOLE SODIUM 40 MG: 40 TABLET, DELAYED RELEASE ORAL at 09:02

## 2023-12-12 RX ADMIN — ASPIRIN 81 MG CHEWABLE TABLET 81 MG: 81 TABLET CHEWABLE at 09:02

## 2023-12-12 NOTE — PLAN OF CARE
Goal Outcome Evaluation:                      No complains from pt. Family was at bedside. Pt has his own insulin pump. Call light within reach. Plan of care ongoing.

## 2023-12-12 NOTE — H&P
[facility]      Patient Name: Richard Garcia  : 1950  MRN: [id number]  Primary Care Physician:  Lopez Ortega FNP  Date of admission: 2023      Subjective      Chief Complaint: weakness    History of Present Illness: Richard Garcia is a 73 y.o. male with PMHx of HTN, HLD, GERD, hep c s/p tx, ABY, T1DM who presented to [facility] on 2023 complaining of weakness.  Admits to increasing weakness complicated by cough, poor PO intake, nausea, vomiting, diarrhea.   Patient states he was diagnosed with covid on 2023 at [facility] and these symptoms have worsened over the past few days however no vomiting or diarrhea today.  Weakness is starting to interfere with ADL's so he presented to  for evaluation.    At , vitals 97.4, HR 72, RR 20, /55, 97% RA.  Labs notable for HS troponin 43 > 42, sodium 135, BUN 51, Cr 1.54, glucose 148, hgb 12.4    ROS   12 point ROS reviewed and negative except as mentioned above      Personal History     Past Medical History:   Diagnosis Date    Diabetes mellitus type I, controlled     Dyslipidemia     Dyslipidemia 2019    Erectile dysfunction     Impression: viagra causes headache. Try cilais    Fatigue     Impression: likely multifactorial,Findings discussed. All questions answered. Differential diagnosis discussed. Follow-up after testing complete, sooner for worsening symptoms or any concerns. Recommend sleep study. Wife says pt snores significantly.    GERD (gastroesophageal reflux disease)     History of chicken pox     History of hepatitis C     Impression: s/p tx with harvoni    History of tobacco use     Hypertension     Medicare annual wellness visit, subsequent     with abnormal findings    Neck pain     Impression: left sided.    Nocturia     ABY (obstructive sleep apnea)     Impression: per sleep study. Pt recently bought new mattress, wants to see how he does with that.    Overweight  (BMI 25.0-29.9)     Screening for alcoholism     Screening for depression     Vitamin D deficiency        Past Surgical History:   Procedure Laterality Date    CATARACT EXTRACTION, BILATERAL      ENDOSCOPY      ENDOSCOPY N/A 03/07/2023    Procedure: ESOPHAGOGASTRODUODENOSCOPY WITH BIOPSY X2 AREAS  AND DILATATION (BOUGIE #54,#58);  Surgeon: Margarito Dorado MD;  Location: [facility] ENDOSCOPY;  Service: Gastroenterology;  Laterality: N/A;  POST: GASTRIC POLYPS AND DYSPHAGIA       Family History: family history includes Coronary artery disease in his father and mother; Diabetes in his mother; Emphysema in his father; Esophageal cancer in his brother; Heart attack in his mother; Hypertension in his father and mother; Pancreatic cancer in his sister. Otherwise pertinent FHx was reviewed and not pertinent to current issue.    Social History:  reports that he quit smoking about 12 years ago. His smoking use included cigarettes. He has a 40.00 pack-year smoking history. He has been exposed to tobacco smoke. He has never used smokeless tobacco. He reports that he does not drink alcohol and does not use drugs.    Home Medications:  Prior to Admission Medications       Prescriptions Last Dose Informant Patient Reported? Taking?    acetaminophen (TYLENOL) 325 MG tablet   Yes No    Take 2 tablets by mouth Every 6 (Six) Hours As Needed for Mild Pain.    aspirin 81 MG chewable tablet   Yes No    Chew 1 tablet Daily As Needed.    azelastine (ASTELIN) 0.1 % nasal spray   Yes No    INSTILL 2 SPRAYS IN EACH NOSTRIL TWICE DAILY AS DIRECTED BY PROVIDER    azithromycin (Zithromax Z-Marcellus) 250 MG tablet   No No    Take 2 tablets by mouth on day 1, then 1 tablet daily on days 2-5    benzonatate (TESSALON) 200 MG capsule   No No    Take 1 capsule by mouth 3 (Three) Times a Day As Needed for Cough.    Carboxymethylcellul-Glycerin 0.5-0.9 % solution   Yes No    Apply 1 drop to eye(s) as directed by provider Daily As Needed.    cholecalciferol  (VITAMIN D3) 400 units tablet   Yes No    Take 1 tablet by mouth Daily.    fluticasone (FLONASE) 50 MCG/ACT nasal spray   Yes No    ibuprofen (ADVIL,MOTRIN) 200 MG tablet   Yes No    Take 1 tablet by mouth Every 6 (Six) Hours As Needed for Mild Pain.    insulin aspart (novoLOG) 100 UNIT/ML injection   Yes No    Inject  under the skin into the appropriate area as directed Daily. Pump about 45-50 U per day    lisinopril (PRINIVIL,ZESTRIL) 20 MG tablet   Yes No    Take 1 tablet by mouth Daily.    omeprazole (priLOSEC) 20 MG capsule   Yes No    Take 1 capsule by mouth Daily.              Allergies:  Allergies   Allergen Reactions    Baclofen Hallucinations    Pramipexole Hallucinations    Pramipexole Dihydrochloride Hallucinations    Sildenafil Headache and Hives     Other reaction(s): Blurring of visual image    Vardenafil Headache, Hives and Photosensitivity       Objective      Vitals:   Temp:  [97.4 °F (36.3 °C)] 97.4 °F (36.3 °C)  Heart Rate:  [70-78] 72  Resp:  [18-20] 20  BP: (100-109)/(51-55) 107/55    Physical Exam  Constitutional:       General: He is not in acute distress.     Appearance: Normal appearance. He is not toxic-appearing.   HENT:      Head: Normocephalic and atraumatic.      Nose: Nose normal. No congestion.      Mouth/Throat:      Pharynx: Oropharynx is clear. No oropharyngeal exudate.   Eyes:      General: No scleral icterus.  Cardiovascular:      Rate and Rhythm: Normal rate and regular rhythm.      Heart sounds: No murmur heard.     No friction rub. No gallop.   Pulmonary:      Effort: No respiratory distress.      Breath sounds: No wheezing or rales.   Abdominal:      General: There is no distension.      Tenderness: There is no abdominal tenderness. There is no guarding.   Musculoskeletal:         General: No swelling or deformity.      Cervical back: Normal range of motion. No rigidity.      Right lower leg: No edema.      Left lower leg: No edema.   Skin:     Coloration: Skin is not  jaundiced.      Findings: No bruising or lesion.   Neurological:      General: No focal deficit present.      Mental Status: He is alert and oriented to person, place, and time.      Motor: No weakness.          Result Review    Result Review:  I have personally reviewed the results from the time of this admission to 12/11/2023 20:50 EST and agree with these findings:  []  Laboratory  []  Microbiology  []  Radiology  []  EKG/Telemetry   []  Cardiology/Vascular   []  Pathology  []  Old records  []  Other:        Assessment & Plan        Active Hospital Problems:  Active Hospital Problems    Diagnosis     **Elevated troponin      Plan:     #Weakness  #Covid 19    - diagnosed with covid on 12/2/23    - Suspect weakness, fatigue, poor po intake 2/2 covid    - pt    - stable on room air, no indication for steroids or remdesivir    - NS @ 100/hr    - Zofran PRN    - HS troponin 43 > 42, denies chest pain, suspect 2/2 demand    - stress on 10/24/2023 negative for ischemia    - echo on 10/10/23 56-60%    - check proBNP, if grossly elevated will re-check echo but see no indication for further cardiac workup at this time    - states no further vomiting/diarrhea today, monitor    #CAD    - EKG NSR without gross ischemia, personally reviewed    - resume home aspirin    #DANIEL    - Cr 1.54 on admission, base unknwon, possible CKD    - sodium 135 on admission    - possible voume depletion    - trial NS @ 100.hr    #T1DM    - chronic, stable    -continue home insulin pump    -diabetic educator    #h/o hep c    - s/p treatment    #Anemia    - hgb 12.4 on admission, base unknwon    - no overt bleeding, follow labs    #GERD    - PPI    #ABY    - supplemental oxygen PRN        DVT prophylaxis: Heparin      CODE STATUS:       Admission Status:  I believe this patient meets observaiton status.    I discussed the patient's findings and my recommendations with patient.    This patient has been examined wearing appropriate Personal  Protective Equipment and discussed with  Patient . 12/11/23      Signature: Electronically signed by Melody Rodrigez DO, 12/11/23, 9:48 PM EST.

## 2023-12-12 NOTE — PLAN OF CARE
Goal Outcome Evaluation:  Plan of Care Reviewed With: patient, spouse           Outcome Evaluation: Patient is a 74 y/o M who was admitted to [facility] on 12/11/23 with nausea and vomiting.  Per pt he was diagnosed with COVID-19 on 12/2.  Was improving but then had more N/V.  CXR negative.  PMHx includes HTN, GERD, Hep C, and T1DM.  Pt also is having neurological deficits related to rigidity of his body, weakness, and impaired motor control at times.  Gait is affected also.  Per pt's wife they have been to numerous different doctors to figure out what is going on with patient.  She has noticed he is more forgetful also.  He had at one point been diagnosed with Parkinsonism, but not actual Parkinson's Disease.  He has since been told he does not have that.  Since his COVID diagnosis he has had more mobility deficits.  He has had OPPT and OPOT in the recent past.  At baseline he is independent with ADLs and mobility, but at times will need assist getting OOB.  He does not typically use an AD.  During today's evaluation pt was min A for supine to sit, SBA with sit to stand using RW, and CGA with RW for 12 feet.  He required cues to improve his stance, step length, and maneuvering of RW.  Attempted to have patient take a step without RW, but he was more steady/safe with use of walker.  He will need RW for home.  At this time PT recommends pt d/c home with spouse and OPPT when he is able.  Pt d/c from facility this date.      Anticipated Discharge Disposition (PT): home with outpatient therapy services

## 2023-12-12 NOTE — THERAPY EVALUATION
Patient Name: Richard Garcia  : 1950    MRN: [id number]                              Today's Date: 2023       Admit Date: 2023    Visit Dx:     ICD-10-CM ICD-9-CM   1. Elevated troponin  R79.89 790.6   2. Generalized weakness  R53.1 780.79   3. Acute cough  R05.1 786.2     Patient Active Problem List   Diagnosis    Dyslipidemia    Erectile dysfunction    Fatigue    GERD (gastroesophageal reflux disease)    Hepatitis C    Neuropathy    Nocturia    ABY (obstructive sleep apnea)    Other specified disorders of Eustachian tube, unspecified ear    Polyosteoarthritis    Pulmonary nodule    Type 1 diabetes mellitus    Vitamin D deficiency    Hyperglycemia    Elevated troponin     Past Medical History:   Diagnosis Date    Diabetes mellitus type I, controlled     Dyslipidemia     Dyslipidemia 2019    Erectile dysfunction     Impression: viagra causes headache. Try cilais    Fatigue     Impression: likely multifactorial,Findings discussed. All questions answered. Differential diagnosis discussed. Follow-up after testing complete, sooner for worsening symptoms or any concerns. Recommend sleep study. Wife says pt snores significantly.    GERD (gastroesophageal reflux disease)     History of chicken pox     History of hepatitis C     Impression: s/p tx with harvoni    History of tobacco use     Hypertension     Medicare annual wellness visit, subsequent     with abnormal findings    Neck pain     Impression: left sided.    Nocturia     ABY (obstructive sleep apnea)     Impression: per sleep study. Pt recently bought new mattress, wants to see how he does with that.    Overweight (BMI 25.0-29.9)     Screening for alcoholism     Screening for depression     Vitamin D deficiency      Past Surgical History:   Procedure Laterality Date    CATARACT EXTRACTION, BILATERAL      ENDOSCOPY      ENDOSCOPY N/A 2023    Procedure: ESOPHAGOGASTRODUODENOSCOPY WITH BIOPSY X2 AREAS  AND DILATATION (BOUGIE  #54,#58);  Surgeon: Margarito Dorado MD;  Location: [facility] ENDOSCOPY;  Service: Gastroenterology;  Laterality: N/A;  POST: GASTRIC POLYPS AND DYSPHAGIA      General Information       Row Name 12/12/23 1319          Physical Therapy Time and Intention    Document Type evaluation  -EJ     Mode of Treatment physical therapy  -       Row Name 12/12/23 1319          General Information    Patient Profile Reviewed yes  -EJ     Prior Level of Function independent:;ADL's;gait;transfer;dressing;bathing;min assist:;bed mobility  Per pt/spouse he was independent at home for the most part with ADLs.  He occasionally will need assist getting OOB.  Pt very slow in ambulation and does not typically use AD.  Reports he has gotten worse since having COVID.  -EJ     Existing Precautions/Restrictions fall  -EJ     Barriers to Rehab medically complex;previous functional deficit  -       Row Name 12/12/23 1319          Living Environment    People in Home spouse  -EJ     Name(s) of People in Home Henna (wife)  -       Row Name 12/12/23 1319          Home Main Entrance    Number of Stairs, Main Entrance four  -EJ     Stair Railings, Main Entrance railings safe and in good condition  -       Row Name 12/12/23 1319          Stairs Within Home, Primary    Number of Stairs, Within Home, Primary none  -EJ       Row Name 12/12/23 1319          Cognition    Orientation Status (Cognition) oriented x 4  -EJ       Row Name 12/12/23 1319          Safety Issues, Functional Mobility    Impairments Affecting Function (Mobility) balance;endurance/activity tolerance;motor control;motor planning;muscle tone abnormal;pain;strength;range of motion (ROM)  -EJ               User Key  (r) = Recorded By, (t) = Taken By, (c) = Cosigned By      Initials Name Provider Type    EJ Janel Vazquez, PT Physical Therapist                   Mobility       Row Name 12/12/23 1321          Bed Mobility    Bed Mobility bed mobility (all) activities  -     All  Activities, [place] (Bed Mobility) minimum assist (75% patient effort)  -EJ     Assistive Device (Bed Mobility) bed rails  -       Row Name 12/12/23 1321          Sit-Stand Transfer    Sit-Stand [place] (Transfers) standby assist  -EJ     Assistive Device (Sit-Stand Transfers) walker, front-wheeled  -EJ       Row Name 12/12/23 1321          Gait/Stairs (Locomotion)    [place] Level (Gait) contact guard  -     Assistive Device (Gait) walker, front-wheeled  -     Distance in Feet (Gait) 12  -     Deviations/Abnormal Patterns (Gait) ataxic;base of support, narrow;gait speed decreased;festinating/shuffling;colin decreased;stride length decreased  -EJ     Bilateral Gait Deviations heel strike decreased  -EJ               User Key  (r) = Recorded By, (t) = Taken By, (c) = Cosigned By      Initials Name Provider Type    EJ Janel aVzquez, PT Physical Therapist                   Obj/Interventions       [facility] Name 12/12/23 1322          Range of Motion Comprehensive    Comment, General Range of Motion Reduced knee extension AROM bilaterally.  -[facility] Name 12/12/23 1322          Strength Comprehensive (MMT)    Comment, General Manual Muscle Testing (MMT) Assessment BLE strength grossly 4- to 4/5.  -[facility] Name 12/12/23 1322          Motor Skills    Motor Skills functional endurance  -EJ     Functional Endurance Fair  -[facility] Name 12/12/23 1322          Balance    Balance Assessment sitting static balance;sitting dynamic balance;sit to stand dynamic balance;standing static balance;standing dynamic balance  -EJ     Static Sitting Balance modified independence  -EJ     Dynamic Sitting Balance modified independence  -     Position, Sitting Balance sitting edge of bed  -EJ     Sit to Stand Dynamic Balance standby assist  -EJ     Static Standing Balance standby assist  -EJ     Dynamic Standing Balance contact guard  -EJ     Position/Device Used, Standing Balance walker, front-wheeled  -EJ      Balance Interventions sitting;standing;sit to stand;static;supported;dynamic;minimal challenge  -EJ               User Key  (r) = Recorded By, (t) = Taken By, (c) = Cosigned By      Initials Name Provider Type    EJ Janel Vazquez, PT Physical Therapist                   Goals/Plan    No documentation.                  Clinical Impression       Row Name 12/12/23 1323          Pain    Pretreatment Pain Rating 3/10  -EJ     Posttreatment Pain Rating 3/10  -EJ     Pain Location lower  -EJ     Pain Location - back  -EJ     Pain Intervention(s) Therapeutic presence;Emotional support;Repositioned;Ambulation/increased activity  -       Row Name 12/12/23 1323          Plan of Care Review    Plan of Care Reviewed With patient;spouse  -     Outcome Evaluation Patient is a 72 y/o M who was admitted to [facility] on 12/11/23 with nausea and vomiting.  Per pt he was diagnosed with COVID-19 on 12/2.  Was improving but then had more N/V.  CXR negative.  PMHx includes HTN, GERD, Hep C, and T1DM.  Pt also is having neurological deficits related to rigidity of his body, weakness, and impaired motor control at times.  Gait is affected also.  Per pt's wife they have been to numerous different doctors to figure out what is going on with patient.  She has noticed he is more forgetful also.  He had at one point been diagnosed with Parkinsonism, but not actual Parkinson's Disease.  He has since been told he does not have that.  Since his COVID diagnosis he has had more mobility deficits.  He has had OPPT and OPOT in the recent past.  At baseline he is independent with ADLs and mobility, but at times will need assist getting OOB.  He does not typically use an AD.  During today's evaluation pt was min A for supine to sit, SBA with sit to stand using RW, and CGA with RW for 12 feet.  He required cues to improve his stance, step length, and maneuvering of RW.  Attempted to have patient take a step without RW, but he was more steady/safe with  use of walker.  He will need RW for home.  At this time PT recommends pt d/c home with spouse and OPPT when he is able.  Pt d/c from facility this date.  -       Row Name 12/12/23 1323          Therapy Assessment/Plan (PT)    Criteria for Skilled Interventions Met (PT) no;other (see comments)  Pt would benefit but is d/c from hospital today.  -EJ     Therapy Frequency (PT) evaluation only  -EJ     Predicted Duration of Therapy Intervention (PT) discharge  -       Row Name 12/12/23 1323          Positioning and Restraints    Pre-Treatment Position in bed  -EJ     Post Treatment Position chair  -EJ     In Chair sitting;call light within reach;encouraged to call for assist;exit alarm on;notified nsg;with family/caregiver  -               User Key  (r) = Recorded By, (t) = Taken By, (c) = Cosigned By      Initials Name Provider Type    Janel Dasilva, PT Physical Therapist                   Outcome Measures       Row Name 12/12/23 1331 12/12/23 0909       How much help from another person do you currently need...    Turning from your back to your side while in flat bed without using bedrails? 4  -EJ 4  -CH    Moving from lying on back to sitting on the side of a flat bed without bedrails? 3  -EJ 4  -CH    Moving to and from a bed to a chair (including a wheelchair)? 3  -EJ 3  -CH    Standing up from a chair using your arms (e.g., wheelchair, bedside chair)? 3  -EJ 3  -CH    Climbing 3-5 steps with a railing? 3  -EJ 3  -CH    To walk in hospital room? 3  -EJ 3  -CH    AM-PAC 6 Clicks Score (PT) 19  -EJ 20  -CH    Highest Level of Mobility Goal 6 --> Walk 10 steps or more  - 6 --> Walk 10 steps or more  -      Row Name 12/12/23 1331          Functional Assessment    Outcome Measure Options AM-PAC 6 Clicks Basic Mobility (PT)  -               User Key  (r) = Recorded By, (t) = Taken By, (c) = Cosigned By      Initials Name Provider Type    Janel Dasilva, PT Physical Therapist    CH Hosler, Catherine,  RN Registered Nurse                                   PT Recommendation and Plan     Plan of Care Reviewed With: patient, spouse  Outcome Evaluation: Patient is a 74 y/o M who was admitted to [facility] on 12/11/23 with nausea and vomiting.  Per pt he was diagnosed with COVID-19 on 12/2.  Was improving but then had more N/V.  CXR negative.  PMHx includes HTN, GERD, Hep C, and T1DM.  Pt also is having neurological deficits related to rigidity of his body, weakness, and impaired motor control at times.  Gait is affected also.  Per pt's wife they have been to numerous different doctors to figure out what is going on with patient.  She has noticed he is more forgetful also.  He had at one point been diagnosed with Parkinsonism, but not actual Parkinson's Disease.  He has since been told he does not have that.  Since his COVID diagnosis he has had more mobility deficits.  He has had OPPT and OPOT in the recent past.  At baseline he is independent with ADLs and mobility, but at times will need assist getting OOB.  He does not typically use an AD.  During today's evaluation pt was min A for supine to sit, SBA with sit to stand using RW, and CGA with RW for 12 feet.  He required cues to improve his stance, step length, and maneuvering of RW.  Attempted to have patient take a step without RW, but he was more steady/safe with use of walker.  He will need RW for home.  At this time PT recommends pt d/c home with spouse and OPPT when he is able.  Pt d/c from facility this date.     Time Calculation:         PT Charges       Row Name 12/12/23 1332             Time Calculation    Start Time 0947  -EJ      Stop Time 1033  -EJ      Time Calculation (min) 46 min  -EJ      PT Received On 12/12/23  -EJ         Time Calculation- PT    Total Timed Code Minutes- PT 0 minute(s)  -EJ                User Key  (r) = Recorded By, (t) = Taken By, (c) = Cosigned By      Initials Name Provider Type    Janel Dasilva, PT Physical Therapist                   Therapy Charges for Today       Code Description Service Date Service Provider Modifiers Qty    18119720051 HC-PT EVAL MOD COMPLEXITY 5 12/12/2023 Janel Vazquez, PT  1            PT G-Codes  Outcome Measure Options: AM-PAC 6 Clicks Basic Mobility (PT)  AM-PAC 6 Clicks Score (PT): 19  PT Discharge Summary  Anticipated Discharge Disposition (PT): home with outpatient therapy services    Janel Vazquez, PT  12/12/2023

## 2023-12-12 NOTE — DISCHARGE SUMMARY
[facility]  Discharge Summary    Date of Service: 2023  Patient Name: Richard Garcia  : 1950  MRN: [id number]    Date of Admission: 2023  Discharge Diagnosis: Acute Kidney Injury   Date of Discharge:  2023  Primary Care Physician: Lopez Ortega FNP      Presenting Problem:   Elevated troponin [R79.89]  Generalized weakness [R53.1]  Acute cough [R05.1]    Active and Resolved Hospital Problems:  Active Hospital Problems    Diagnosis POA   • **Elevated troponin [R79.89] Yes      Resolved Hospital Problems   No resolved problems to display.         Hospital Course     HPI:  Per the H&P written by Melody Rodrigez DO, dated 2023:      Hospital Course:  Richard Garcia is a 73 y.o. male with PMHx of HTN, HLD, GERD, hep c s/p tx, ABY, T1DM who presented to [facility] on 2023 complaining of weakness.  Admits to increasing weakness complicated by cough, poor PO intake, nausea, vomiting, diarrhea.   Patient states he was diagnosed with covid on 2023 at [facility] and these symptoms have worsened over the past few days however no vomiting or diarrhea today.  Weakness is starting to interfere with ADL's so he presented to [facility] for evaluation.     Trending troponin levels stable, proBNP WNL. DANIEL resolved at time of discharge with fluid infusions, creatinine now normal. Will hold lisinopril and ibuprofen, patient to follow up with PCP in 7-10 days.    DISCHARGE Follow Up Recommendations for labs and diagnostics: Follow up with PCP in 7-10 days.      Reasons For Change In Medications and Indications for New Medications:  Lisinopril stopped due to hypotension and DANIEL.    Day of Discharge     Vital Signs:  Temp:  [96.6 °F (35.9 °C)-98.2 °F (36.8 °C)] 98.1 °F (36.7 °C)  Heart Rate:  [70-82] 77  Resp:  [16-20] 17  BP: (100-118)/(51-59) 118/58    Physical Exam:  Physical Exam  Constitutional:       General: He is awake.      Appearance: Normal  appearance. He is well-developed and well-groomed.   HENT:      Head: Normocephalic and atraumatic.      Nose: Nose normal.      Mouth/Throat:      Mouth: Mucous membranes are moist.      Pharynx: Oropharynx is clear.   Eyes:      Extraocular Movements: Extraocular movements intact.      Conjunctiva/sclera: Conjunctivae normal.      Pupils: Pupils are equal, round, and reactive to light.   Cardiovascular:      Rate and Rhythm: Normal rate and regular rhythm.      Pulses: Normal pulses.      Heart sounds: Normal heart sounds.   Pulmonary:      Effort: Pulmonary effort is normal.      Breath sounds: Normal breath sounds.   Abdominal:      General: Abdomen is flat. Bowel sounds are normal.      Palpations: Abdomen is soft.   Musculoskeletal:         General: Normal range of motion.      Cervical back: Normal range of motion and neck supple.      Right lower leg: No edema.      Left lower leg: No edema.   Skin:     General: Skin is warm and dry.   Neurological:      General: No focal deficit present.      Mental Status: He is alert and oriented to person, place, and time. Mental status is at baseline.      Cranial Nerves: Cranial nerves 2-12 are intact.      Sensory: Sensation is intact.      Motor: Motor function is intact.   Psychiatric:         Mood and Affect: Mood normal.         Behavior: Behavior normal. Behavior is cooperative.         Thought Content: Thought content normal.         Judgment: Judgment normal.            Pertinent  and/or Most Recent Results     LAB RESULTS:      Lab 12/12/23  0625 12/11/23  1810   WBC 4.80 6.04   HEMOGLOBIN 11.3* 12.4*   HEMATOCRIT 34.1* 39.7   PLATELETS 185 227   NEUTROS ABS  --  4.49   IMMATURE GRANS (ABS)  --  0.00   LYMPHS ABS  --  1.05   MONOS ABS  --  0.43   EOS ABS  --  0.03   MCV 91.0 94.7         Lab 12/12/23  0625 12/11/23  1810   SODIUM 138 135*   POTASSIUM 3.8 3.9   CHLORIDE 104 101   CO2 25.0 28.9   ANION GAP 9.0 5.1   BUN 37* 51*   CREATININE 1.01 1.54*   EGFR  78.0 47.0*   GLUCOSE 140* 148*   CALCIUM 8.0* 8.6   MAGNESIUM  --  1.9         Lab 12/11/23  1810   TOTAL PROTEIN 6.0   ALBUMIN 3.8   GLOBULIN 2.2   ALT (SGPT) 18   AST (SGOT) 18   BILIRUBIN 1.2   ALK PHOS 62         Lab 12/12/23  0625 12/11/23 1956 12/11/23  1810   PROBNP 63.2  --   --    HSTROP T  --  42* 43*                 Brief Urine Lab Results  (Last result in the past 365 days)        Color   Clarity   Blood   Leuk Est   Nitrite   Protein   CREAT   Urine HCG        12/11/23 [address] [place]   Clear   Negative   Negative   Negative   Trace                 Microbiology Results (last 10 days)       ** No results found for the last 240 hours. **            XR Chest 2 View    Result Date: 12/11/2023  Impression: Impression: No active pulmonary process radiographically. Electronically Signed: Harrison Leger MD  12/11/2023 6:01 PM EST  Workstation ID: [id number]             Results for orders placed during the hospital encounter of 10/10/23    Adult Transthoracic Echo Complete W/ Cont if Necessary Per Protocol    Interpretation Summary  •  Left ventricular ejection fraction appears to be 56 - 60%.  •  Estimated right ventricular systolic pressure from tricuspid regurgitation is normal (<35 mmHg).      Labs Pending at Discharge:      Procedures Performed           Consults:   Consults       No orders found for last 30 day(s).              Discharge Details        Discharge Medications        Continue These Medications        Instructions Start Date   acetaminophen 325 MG tablet  Commonly known as: TYLENOL   650 mg, Oral, Every 6 Hours PRN      aspirin 81 MG chewable tablet   81 mg, Oral, Daily PRN      azelastine 0.1 % nasal spray  Commonly known as: ASTELIN   INSTILL 2 SPRAYS IN EACH NOSTRIL TWICE DAILY AS DIRECTED BY PROVIDER      Carboxymethylcellul-Glycerin 0.5-0.9 % solution   1 drop, Ophthalmic, Daily PRN      cholecalciferol 10 MCG (400 UNIT) tablet  Commonly known as: VITAMIN D3   400 Units, Oral, Daily       fluticasone 50 MCG/ACT nasal spray  Commonly known as: FLONASE   No dose, route, or frequency recorded.      insulin aspart 100 UNIT/ML injection  Commonly known as: novoLOG   Subcutaneous, Daily, Pump about 45-50 U per day      omeprazole 20 MG capsule  Commonly known as: priLOSEC   1 capsule, Oral, Daily             Stop These Medications      ibuprofen 200 MG tablet  Commonly known as: ADVIL,MOTRIN     lisinopril 20 MG tablet  Commonly known as: PRINIVIL,ZESTRIL              Allergies   Allergen Reactions   • Baclofen Hallucinations   • Pramipexole Hallucinations   • Pramipexole Dihydrochloride Hallucinations   • Sildenafil Headache and Hives     Other reaction(s): Blurring of visual image   • Vardenafil Headache, Hives and Photosensitivity         Discharge Disposition:   Home or Self Care    Diet:  Hospital:  Diet Order   Procedures   • Diet: Cardiac Diets, Diabetic Diets; Healthy Heart (2-3 Na+); Consistent Carbohydrate; Texture: Regular Texture (IDDSI 7); Fluid Consistency: Thin (IDDSI 0)         Discharge Activity:         CODE STATUS:  Code Status and Medical Interventions:   Ordered at: 12/11/23 7290     Code Status (Patient has no pulse and is not breathing):    CPR (Attempt to Resuscitate)     Medical Interventions (Patient has pulse or is breathing):    Full Support         No future appointments.    Additional Instructions for the Follow-ups that You Need to Schedule       Discharge Follow-up with PCP   As directed       Currently Documented PCP:    Lopez Ortega FNP    PCP Phone Number:    [phone]     Follow Up Details: Follow up in 7-10 days                Time spent on Discharge including face to face service:  >30 minutes    Signature: Electronically signed by Miguelangel Jaimes MD, 12/12/23, 10:18 [facility] Hospitalist Team

## 2023-12-12 NOTE — CONSULTS
Diabetes Education    Patient Name:  Richard Garcia  YOB: 1950  MRN: [id number]  Admit Date:  12/11/2023    Educator received consult to see pt due to patient wearing insulin pump. Contacted pt per phone call. Wife answered call and educator completed assessment with wife. Pt wearing the [employer] insulin pump and continuous glucose sensor. BS today at 0720 120 and at 1114 164. Pt using Novolog per pump. [facility]. Pt will be getting an upgrade with pump soon and does need to schedule with [employer] rep. Pt states has no questions for educator at this time. Pt has been discharged so insulin pump contact and log sheet were not initiated.       Electronically signed by:  Viry Villavicencio RN  12/12/23 13:14 EST

## 2023-12-12 NOTE — CASE MANAGEMENT/SOCIAL WORK
Discharge Planning Assessment   J[facility]     Patient Name: Richard Garcia  MRN: [id number]  Today's Date: 12/12/2023    Admit Date: 12/11/2023    Plan: DC PLAN: Return home with wife.     Discharge Needs Assessment       Row Name 12/12/23 0905       Living Environment    People in Home spouse    Current Living Arrangements home    Potentially Unsafe Housing Conditions none    In the past 12 months has the electric, gas, oil, or water company threatened to shut off services in your home? No    Primary Care Provided by self    Family Caregiver if Needed significant other    Quality of Family Relationships helpful;involved    Able to Return to Prior Arrangements yes       Resource/Environmental Concerns    Resource/Environmental Concerns none       Food Insecurity    Within the past 12 months, you worried that your food would run out before you got the money to buy more. Never true    Within the past 12 months, the food you bought just didn't last and you didn't have money to get more. Never true       Transition Planning    Patient/Family Anticipates Transition to home with family    Patient/Family Anticipated Services at Transition     Transportation Anticipated car, drives self;family or friend will provide       Discharge Needs Assessment    Equipment Currently Used at Home none                   Discharge Plan       Row Name 12/12/23 0906       Plan    Plan DC PLAN: Return home with wife.    Patient/Family in Agreement with Plan yes    Plan Comments Met with patient at bedside, from routine home with wife. Independent with ADL's uses Bars on the bed to help get in and out. No other DME. PCP is [facility], Pharmacy is [facility] to afford medications. Denies any transportation issues, still drives or wife will provide. Denies any concerns about return home. Reviewed [facility], obtained signiture, copy left at bedside. Pending Cardiology, possible discharge today or tomorrow.                  Continued Care  and Services - Admitted Since 12/11/2023    Coordination has not been started for this encounter.       Expected Discharge Date and Time       Expected Discharge Date Expected Discharge Time    Dec 13, 2023            Demographic Summary       Row Name 12/12/23 0905       General Information    Admission Type observation    Required Notices Provided Observation Status Notice    Referral Source admission list    Reason for Consult discharge planning    Preferred Language English       Contact Information    Permission Granted to Share Info With     Contact Information Obtained for                    Functional Status       Row Name 12/12/23 0905       Functional Status    Usual Activity Tolerance moderate    Current Activity Tolerance moderate       Assessment of Health Literacy    How often do you have someone help you read hospital materials? Sometimes    How often do you have problems learning about your medical condition because of difficulty understanding written information? Sometimes    How often do you have a problem understanding what is told to you about your medical condition? Sometimes    How confident are you filling out medical forms by yourself? Somewhat    Health Literacy Moderate       Functional Status, IADL    Medications independent;assistive person    Meal Preparation independent;assistive person    Housekeeping independent;assistive person    Laundry independent;assistive person    Shopping independent;assistive person    IADL Comments Wife       Mental Status    General Appearance WDL WDL                                 Patient Forms       Row Name 12/12/23 0902       Patient Forms    Important Message from Medicare (IMM) Delivered  [facility] 12/12/23 per cm    Delivered to Patient    Method of delivery In person                    Naty Rush RN

## 2023-12-12 NOTE — ED NOTES
Pt with wife at bedside assisting him with dinner. Nurse explained plan before reevaluation and wife verbalized understanding. Nurse asked if patient is able to provide urine to which both patient and wife states he cannot at this moment. Wife states that if patient gets his dinner done that it could probably encourage him to provide some urine.

## 2023-12-13 NOTE — CASE MANAGEMENT/SOCIAL WORK
Case Management Discharge Note      Final Note: Routine home         Selected Continued Care - Discharged on 12/12/2023 Admission date: 12/11/2023 - Discharge disposition: Home or Self Care           Transportation Services  Private: Car    Final Discharge Disposition Code: 01 - home or self-care

## 2024-11-05 ENCOUNTER — HOSPITAL ENCOUNTER (EMERGENCY)
Facility: HOSPITAL | Age: 74
Discharge: HOME | End: 2024-11-05
Attending: EMERGENCY MEDICINE | Admitting: EMERGENCY MEDICINE
Payer: OTHER GOVERNMENT

## 2024-11-05 ENCOUNTER — APPOINTMENT (OUTPATIENT)
Dept: CT IMAGING | Facility: HOSPITAL | Age: 74
End: 2024-11-05
Payer: OTHER GOVERNMENT

## 2024-11-05 VITALS
OXYGEN SATURATION: 98 % | WEIGHT: 146 LBS | HEART RATE: 66 BPM | RESPIRATION RATE: 20 BRPM | DIASTOLIC BLOOD PRESSURE: 62 MMHG | BODY MASS INDEX: 25.87 KG/M2 | HEIGHT: 63 IN | TEMPERATURE: 97.5 F | SYSTOLIC BLOOD PRESSURE: 156 MMHG

## 2024-11-05 DIAGNOSIS — M51.360 DEGENERATION OF INTERVERTEBRAL DISC OF LUMBAR REGION WITH DISCOGENIC BACK PAIN: Primary | ICD-10-CM

## 2024-11-05 DIAGNOSIS — R11.0 NAUSEA: ICD-10-CM

## 2024-11-05 DIAGNOSIS — S39.012A BACK STRAIN, INITIAL ENCOUNTER: ICD-10-CM

## 2024-11-05 LAB
ALBUMIN SERPL-MCNC: 4 G/DL (ref 3.5–5.2)
ALBUMIN/GLOB SERPL: 1.7 G/DL
ALP SERPL-CCNC: 73 U/L (ref 39–117)
ALT SERPL W P-5'-P-CCNC: 20 U/L (ref 1–41)
ANION GAP SERPL CALCULATED.3IONS-SCNC: 7 MMOL/L (ref 5–15)
AST SERPL-CCNC: 17 U/L (ref 1–40)
BASOPHILS # BLD AUTO: 0.07 10*3/MM3 (ref 0–0.2)
BASOPHILS NFR BLD AUTO: 0.7 % (ref 0–1.5)
BILIRUB SERPL-MCNC: 1.6 MG/DL (ref 0–1.2)
BILIRUB UR QL STRIP: NEGATIVE
BUN SERPL-MCNC: 18 MG/DL (ref 8–23)
BUN/CREAT SERPL: 17.6 (ref 7–25)
CALCIUM SPEC-SCNC: 9.2 MG/DL (ref 8.6–10.5)
CHLORIDE SERPL-SCNC: 100 MMOL/L (ref 98–107)
CLARITY UR: CLEAR
CO2 SERPL-SCNC: 30 MMOL/L (ref 22–29)
COLOR UR: YELLOW
CREAT SERPL-MCNC: 1.02 MG/DL (ref 0.76–1.27)
DEPRECATED RDW RBC AUTO: 43.2 FL (ref 37–54)
EGFRCR SERPLBLD CKD-EPI 2021: 76.6 ML/MIN/1.73
EOSINOPHIL # BLD AUTO: 0.1 10*3/MM3 (ref 0–0.4)
EOSINOPHIL NFR BLD AUTO: 1 % (ref 0.3–6.2)
ERYTHROCYTE [DISTWIDTH] IN BLOOD BY AUTOMATED COUNT: 12.4 % (ref 12.3–15.4)
GLOBULIN UR ELPH-MCNC: 2.4 GM/DL
GLUCOSE SERPL-MCNC: 289 MG/DL (ref 65–99)
GLUCOSE UR STRIP-MCNC: ABNORMAL MG/DL
HCT VFR BLD AUTO: 39.2 % (ref 37.5–51)
HGB BLD-MCNC: 12.6 G/DL (ref 13–17.7)
HGB UR QL STRIP.AUTO: NEGATIVE
IMM GRANULOCYTES # BLD AUTO: 0.02 10*3/MM3 (ref 0–0.05)
IMM GRANULOCYTES NFR BLD AUTO: 0.2 % (ref 0–0.5)
KETONES UR QL STRIP: ABNORMAL
LEUKOCYTE ESTERASE UR QL STRIP.AUTO: NEGATIVE
LIPASE SERPL-CCNC: 17 U/L (ref 13–60)
LYMPHOCYTES # BLD AUTO: 1.74 10*3/MM3 (ref 0.7–3.1)
LYMPHOCYTES NFR BLD AUTO: 16.9 % (ref 19.6–45.3)
MCH RBC QN AUTO: 30.1 PG (ref 26.6–33)
MCHC RBC AUTO-ENTMCNC: 32.1 G/DL (ref 31.5–35.7)
MCV RBC AUTO: 93.8 FL (ref 79–97)
MONOCYTES # BLD AUTO: 0.48 10*3/MM3 (ref 0.1–0.9)
MONOCYTES NFR BLD AUTO: 4.7 % (ref 5–12)
NEUTROPHILS NFR BLD AUTO: 7.91 10*3/MM3 (ref 1.7–7)
NEUTROPHILS NFR BLD AUTO: 76.5 % (ref 42.7–76)
NITRITE UR QL STRIP: NEGATIVE
PH UR STRIP.AUTO: <=5 [PH] (ref 5–8)
PLATELET # BLD AUTO: 168 10*3/MM3 (ref 140–450)
PMV BLD AUTO: 11.3 FL (ref 6–12)
POTASSIUM SERPL-SCNC: 4 MMOL/L (ref 3.5–5.2)
PROT SERPL-MCNC: 6.4 G/DL (ref 6–8.5)
PROT UR QL STRIP: NEGATIVE
RBC # BLD AUTO: 4.18 10*6/MM3 (ref 4.14–5.8)
SODIUM SERPL-SCNC: 137 MMOL/L (ref 136–145)
SP GR UR STRIP: 1.02 (ref 1–1.03)
UROBILINOGEN UR QL STRIP: ABNORMAL
WBC NRBC COR # BLD AUTO: 10.32 10*3/MM3 (ref 3.4–10.8)

## 2024-11-05 PROCEDURE — 83690 ASSAY OF LIPASE: CPT | Performed by: NURSE PRACTITIONER

## 2024-11-05 PROCEDURE — 80053 COMPREHEN METABOLIC PANEL: CPT | Performed by: NURSE PRACTITIONER

## 2024-11-05 PROCEDURE — 85025 COMPLETE CBC W/AUTO DIFF WBC: CPT | Performed by: NURSE PRACTITIONER

## 2024-11-05 PROCEDURE — 81003 URINALYSIS AUTO W/O SCOPE: CPT | Performed by: EMERGENCY MEDICINE

## 2024-11-05 PROCEDURE — 74176 CT ABD & PELVIS W/O CONTRAST: CPT

## 2024-11-05 PROCEDURE — 99284 EMERGENCY DEPT VISIT MOD MDM: CPT

## 2024-11-05 RX ORDER — METHOCARBAMOL 500 MG/1
500 TABLET, FILM COATED ORAL 3 TIMES DAILY
Qty: 15 TABLET | Refills: 0 | Status: SHIPPED | OUTPATIENT
Start: 2024-11-05 | End: 2024-11-10

## 2024-11-05 RX ORDER — SODIUM CHLORIDE 0.9 % (FLUSH) 0.9 %
10 SYRINGE (ML) INJECTION AS NEEDED
Status: DISCONTINUED | OUTPATIENT
Start: 2024-11-05 | End: 2024-11-05 | Stop reason: HOSPADM

## 2024-11-05 RX ORDER — METHOCARBAMOL 500 MG/1
500 TABLET, FILM COATED ORAL 3 TIMES DAILY
Qty: 15 TABLET | Refills: 0 | Status: SHIPPED | OUTPATIENT
Start: 2024-11-05 | End: 2024-11-05

## 2024-11-05 RX ORDER — ONDANSETRON 4 MG/1
4 TABLET, ORALLY DISINTEGRATING ORAL EVERY 8 HOURS PRN
Qty: 21 TABLET | Refills: 0 | Status: SHIPPED | OUTPATIENT
Start: 2024-11-05

## 2024-11-05 NOTE — DISCHARGE INSTRUCTIONS
Muscle relaxer as prescribed. Drink at least 75 ounces of water daily. Exercises per handout. Rotate heat and ice every 2 hours while awake. Call PCP for recheck. Go to the ER for new or worsening symptoms.

## 2024-11-05 NOTE — ED NOTES
When discharging pt, pt requested something for some nausea due to having intermittent nausea spells today and yesterday. No vomiting reported, just nausea.

## 2024-11-05 NOTE — FSED PROVIDER NOTE
Subjective   History of Present Illness  74-year-old male presents with 2-day history of left flank pain.  He denies urinary symptoms.  He denies history of kidney stones.  Denies any christine hematuria.  Denies abdominal pain nausea vomiting diarrhea.  He does report that there is some pain when reaching for items.  Wife does express some concern because the patient has frequent falls due to parkinsonism.  She reports that he is followed by neurology and has seen PT, OT, and speech therapy.    History provided by:  Patient and spouse      Review of Systems    Past Medical History:   Diagnosis Date    Diabetes mellitus type I, controlled     Dyslipidemia     Dyslipidemia 06/21/2019    Erectile dysfunction     Impression: viagra causes headache. Try cilais    Fatigue     Impression: likely multifactorial,Findings discussed. All questions answered. Differential diagnosis discussed. Follow-up after testing complete, sooner for worsening symptoms or any concerns. Recommend sleep study. Wife says pt snores significantly.    GERD (gastroesophageal reflux disease)     History of chicken pox     History of hepatitis C     Impression: s/p tx with harvoni    History of tobacco use     Hypertension     Medicare annual wellness visit, subsequent     with abnormal findings    Neck pain     Impression: left sided.    Nocturia     ABY (obstructive sleep apnea)     Impression: per sleep study. Pt recently bought new mattress, wants to see how he does with that.    Overweight (BMI 25.0-29.9)     Screening for alcoholism     Screening for depression     Vitamin D deficiency        Allergies   Allergen Reactions    Baclofen Hallucinations    Pramipexole Hallucinations    Pramipexole Dihydrochloride Hallucinations    Sildenafil Headache and Hives     Other reaction(s): Blurring of visual image    Vardenafil Headache, Hives and Photosensitivity       Past Surgical History:   Procedure Laterality Date    CATARACT EXTRACTION, BILATERAL       ENDOSCOPY      ENDOSCOPY N/A 2023    Procedure: ESOPHAGOGASTRODUODENOSCOPY WITH BIOPSY X2 AREAS  AND DILATATION (BOUGIE #54,#58);  Surgeon: Margarito Dorado MD;  Location: [facility] ENDOSCOPY;  Service: Gastroenterology;  Laterality: N/A;  POST: GASTRIC POLYPS AND DYSPHAGIA       Family History   Problem Relation Age of Onset    Diabetes Mother         Diabetes Mellitus     Coronary artery disease Mother     Hypertension Mother     Heart attack Mother     Coronary artery disease Father     Hypertension Father     Emphysema Father     Pancreatic cancer Sister     Esophageal cancer Brother        Social History     Socioeconomic History    Marital status:    Tobacco Use    Smoking status: Former     Current packs/day: 0.00     Average packs/day: 1 pack/day for 40.0 years (40.0 ttl pk-yrs)     Types: Cigarettes     Start date: 1971     Quit date: 2011     Years since quittin.7     Passive exposure: Past    Smokeless tobacco: Never   Vaping Use    Vaping status: Never Used   Substance and Sexual Activity    Alcohol use: No    Drug use: No    Sexual activity: Defer           Objective   Physical Exam  Vitals and nursing note reviewed.   Constitutional:       General: He is awake. He is not in acute distress.     Appearance: Normal appearance. He is well-developed and normal weight. He is not ill-appearing, toxic-appearing or diaphoretic.   HENT:      Mouth/Throat:      Mouth: Mucous membranes are moist.   Eyes:      General: No scleral icterus.  Cardiovascular:      Rate and Rhythm: Normal rate and regular rhythm.      Pulses: Normal pulses.      Heart sounds: Normal heart sounds, S1 normal and S2 normal. Heart sounds not distant. No murmur heard.     No friction rub. No gallop.   Pulmonary:      Effort: Pulmonary effort is normal.      Breath sounds: Normal breath sounds and air entry.   Abdominal:      General: Abdomen is flat. Bowel sounds are normal. There is no distension.      Palpations:  "Abdomen is soft. There is no mass.      Tenderness: There is no abdominal tenderness. There is left CVA tenderness. There is no right CVA tenderness, guarding or rebound.      Hernia: No hernia is present.   Skin:     General: Skin is warm and dry.      Capillary Refill: Capillary refill takes less than 2 seconds.      Coloration: Skin is not jaundiced or pale.      Findings: No rash.   Neurological:      Mental Status: He is alert and oriented to person, place, and time.   Psychiatric:         Behavior: Behavior is cooperative.         Procedures           ED Course                                           Medical Decision Making  Interpreted by radiologist as below:     CT Abdomen Pelvis Without Contrast    Result Date: 11/5/2024  No acute process. No urolithiasis or obstructive uropathy Electronically Signed: Aram Lord  11/5/2024 10:59 AM EST  Workstation ID: [id number]       /62   Pulse 66   Temp 97.5 °F (36.4 °C) (Oral)   Resp 20   Ht 160 cm (63\")   Wt 66.2 kg (146 lb)   SpO2 98%   BMI 25.86 kg/m²      Lab Results (last 24 hours)       Procedure Component Value Units Date/Time    Urinalysis With Culture If Indicated - Urine, Clean Catch [[id number]]  (Abnormal) Collected: 11/05/24 1010    Specimen: Urine, Clean Catch Updated: 11/05/24 1012     Color, UA Yellow     Appearance, UA Clear     pH, UA <=5.0     Specific Gravity, UA 1.025     Glucose, UA >=1000 mg/dL (3+)     Ketones, UA Trace     Bilirubin, UA Negative     Blood, UA Negative     Protein, UA Negative     Leuk Esterase, UA Negative     Nitrite, UA Negative     Urobilinogen, UA 0.2 E.U./dL    Narrative:      In absence of clinical symptoms, the presence of pyuria, bacteria, and/or nitrites on the urinalysis result does not correlate with infection.  Urine microscopic not indicated.    CBC & Differential [[id number]]  (Abnormal) Collected: 11/05/24 1032    Specimen: Blood Updated: 11/05/24 1037    Narrative:      The following orders were " created for panel order CBC & Differential.  Procedure                               Abnormality         Status                     ---------                               -----------         ------                     CBC Auto Differential[[id number]]        Abnormal            Final result                 Please view results for these tests on the individual orders.    Comprehensive Metabolic Panel [[id number]]  (Abnormal) Collected: 11/05/24 1032    Specimen: Blood Updated: 11/05/24 1101     Glucose 289 mg/dL      BUN 18 mg/dL      Creatinine 1.02 mg/dL      Sodium 137 mmol/L      Potassium 4.0 mmol/L      Chloride 100 mmol/L      CO2 30.0 mmol/L      Calcium 9.2 mg/dL      Total Protein 6.4 g/dL      Albumin 4.0 g/dL      ALT (SGPT) 20 U/L      AST (SGOT) 17 U/L      Alkaline Phosphatase 73 U/L      Total Bilirubin 1.6 mg/dL      Globulin 2.4 gm/dL      A/G Ratio 1.7 g/dL      BUN/Creatinine Ratio 17.6     Anion Gap 7.0 mmol/L      eGFR 76.6 mL/min/1.73     Narrative:      GFR Normal >60  Chronic Kidney Disease <60  Kidney Failure <15    The GFR formula is only valid for adults with stable renal function between ages 18 and 70.    Lipase [[id number]]  (Normal) Collected: 11/05/24 1032    Specimen: Blood Updated: 11/05/24 1101     Lipase 17 U/L     CBC Auto Differential [[id number]]  (Abnormal) Collected: 11/05/24 1032    Specimen: Blood Updated: 11/05/24 1037     WBC 10.32 10*3/mm3      RBC 4.18 10*6/mm3      Hemoglobin 12.6 g/dL      Hematocrit 39.2 %      MCV 93.8 fL      MCH 30.1 pg      MCHC 32.1 g/dL      RDW 12.4 %      RDW-SD 43.2 fl      MPV 11.3 fL      Platelets 168 10*3/mm3      Neutrophil % 76.5 %      Lymphocyte % 16.9 %      Monocyte % 4.7 %      Eosinophil % 1.0 %      Basophil % 0.7 %      Immature Grans % 0.2 %      Neutrophils, Absolute 7.91 10*3/mm3      Lymphocytes, Absolute 1.74 10*3/mm3      Monocytes, Absolute 0.48 10*3/mm3      Eosinophils, Absolute 0.10 10*3/mm3      Basophils, Absolute 0.07  10*3/mm3      Immature Grans, Absolute 0.02 10*3/mm3              Medications   sodium chloride 0.9 % flush 10 mL (has no administration in time range)        Appropriate PPE worn during patient exam.  Appropriate monitoring initiated. Patient is alert and oriented x3.   No acute distress noted. Regular rate and rhythm with S1/S2. Lungs are clear to auscultation bilaterally.  Abdomen soft, nontender, nondistended.  Left CVAT noted on exam.  Bowel sounds present in all four quadrants. Vital signs stable.  Patient has an insulin pump that reads 300 mg/dL for current blood sugar.  He gave himself an insulin bolus.  IV established and labs obtained.  CT of the abdomen pelvis without IV contrast obtained with the above findings.  CBC essentially unremarkable.  CMP was significant for glucose of 289, otherwise essentially unremarkable.  Urine showed glucose and trace ketones.  Lipase normal at 17.  CT showed no acute abdominal pelvis abnormalities but there was degenerative changes noted in the lumbar spine.  Patient reports that the pain is reproducible with movement.  Patient was treated for lumbar strain.  Prescription sent for Robaxin.  Upon being discharged, patient reported to nurse that he has had some nausea and requested a refill for Zofran which was placed.    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 My radiology interpretation CT abdomen pelvis without IV contrast showed no acute abdominal pelvic abnormality.  There were some degenerative findings in the spine.  Differential Diagnoses, not all-inclusive and does not constitute entirety of all causes: Obstructive uropathy, compression fracture, back strain.  Obstructive uropathy ruled out by urinalysis and CT.  Compression fracture ruled out by imaging.  Back strain determined by H&P.    Disposition/Treatment: Discussed results with patient, verbalized  understanding. Discussed reasons to return, patient verbalized understanding. Agreeable with plan of care. Patient was stable upon discharge.    Upon reassessment, patient is flesh tone warm and dry no acute distress noted.  Vital signs are stable.    Part of this note may be an electronic transcription/translation of spoken language to printed text using the Dragon Dictation System.   Final diagnoses:   Degeneration of intervertebral disc of lumbar region with discogenic back pain   Back strain, initial encounter   Nausea       ED Disposition  ED Disposition       ED Disposition   Discharge    Condition   Stable    Comment   --               Lopez Ortega, APRN  [address] [facility] [place] [address] [phone]    Schedule an appointment as soon as possible for a visit       [address] [address] [phone]  Go to   If symptoms worsen         Medication List        New Prescriptions      methocarbamol 500 MG tablet  Commonly known as: ROBAXIN  Take 1 tablet by mouth 3 (Three) Times a Day for 5 days.     ondansetron ODT 4 MG disintegrating tablet  Commonly known as: ZOFRAN-ODT  Take 1 tablet by mouth Every 8 (Eight) Hours As Needed for Nausea or Vomiting.               Where to Get Your Medications        These medications were sent to [id number] - [place], [place] - [address] [place] [address] AT [address] OF  &  - [phone]  - [phone] [address] [address]      Phone: [phone]   methocarbamol 500 MG tablet  ondansetron ODT 4 MG disintegrating tablet

## 2024-11-06 ENCOUNTER — HOSPITAL ENCOUNTER (EMERGENCY)
Facility: HOSPITAL | Age: 74
Discharge: HOME | End: 2024-11-06
Attending: EMERGENCY MEDICINE | Admitting: EMERGENCY MEDICINE
Payer: OTHER GOVERNMENT

## 2024-11-06 VITALS
DIASTOLIC BLOOD PRESSURE: 63 MMHG | SYSTOLIC BLOOD PRESSURE: 136 MMHG | BODY MASS INDEX: 26.21 KG/M2 | HEART RATE: 74 BPM | RESPIRATION RATE: 16 BRPM | HEIGHT: 63 IN | TEMPERATURE: 98.3 F | OXYGEN SATURATION: 96 % | WEIGHT: 147.93 LBS

## 2024-11-06 DIAGNOSIS — M54.50 ACUTE LEFT-SIDED LOW BACK PAIN, UNSPECIFIED WHETHER SCIATICA PRESENT: Primary | ICD-10-CM

## 2024-11-06 PROCEDURE — 25010000002 KETOROLAC TROMETHAMINE PER 15 MG: Performed by: EMERGENCY MEDICINE

## 2024-11-06 PROCEDURE — 96372 THER/PROPH/DIAG INJ SC/IM: CPT

## 2024-11-06 PROCEDURE — 99283 EMERGENCY DEPT VISIT LOW MDM: CPT

## 2024-11-06 PROCEDURE — 25010000002 METHYLPREDNISOLONE PER 125 MG: Performed by: EMERGENCY MEDICINE

## 2024-11-06 RX ORDER — KETOROLAC TROMETHAMINE 30 MG/ML
30 INJECTION, SOLUTION INTRAMUSCULAR; INTRAVENOUS ONCE
Status: COMPLETED | OUTPATIENT
Start: 2024-11-06 | End: 2024-11-06

## 2024-11-06 RX ORDER — HYDROCODONE BITARTRATE AND ACETAMINOPHEN 5; 325 MG/1; MG/1
1 TABLET ORAL EVERY 6 HOURS PRN
Qty: 12 TABLET | Refills: 0 | Status: SHIPPED | OUTPATIENT
Start: 2024-11-06

## 2024-11-06 RX ORDER — METHYLPREDNISOLONE SODIUM SUCCINATE 125 MG/2ML
80 INJECTION, POWDER, LYOPHILIZED, FOR SOLUTION INTRAMUSCULAR; INTRAVENOUS ONCE
Status: COMPLETED | OUTPATIENT
Start: 2024-11-06 | End: 2024-11-06

## 2024-11-06 RX ORDER — HYDROCODONE BITARTRATE AND ACETAMINOPHEN 7.5; 325 MG/1; MG/1
1 TABLET ORAL ONCE
Status: COMPLETED | OUTPATIENT
Start: 2024-11-06 | End: 2024-11-06

## 2024-11-06 RX ORDER — PREDNISONE 20 MG/1
20 TABLET ORAL DAILY
Qty: 5 TABLET | Refills: 0 | Status: SHIPPED | OUTPATIENT
Start: 2024-11-06 | End: 2024-11-11

## 2024-11-06 RX ADMIN — METHYLPREDNISOLONE SODIUM SUCCINATE 80 MG: 125 INJECTION, POWDER, FOR SOLUTION INTRAMUSCULAR; INTRAVENOUS at 17:23

## 2024-11-06 RX ADMIN — KETOROLAC TROMETHAMINE 30 MG: 30 INJECTION, SOLUTION INTRAMUSCULAR at 17:23

## 2024-11-06 RX ADMIN — HYDROCODONE BITARTRATE AND ACETAMINOPHEN 1 TABLET: 7.5; 325 TABLET ORAL at 17:23

## 2024-11-06 NOTE — ED PROVIDER NOTES
Subjective   History of Present Illness  Chief complaint: Left flank pain    74-year-old male presents with left flank pain.  Symptoms have been getting progressively worse over the past few months.  He went to the freestanding emergency room yesterday and had CT of the abdomen and pelvis which was unremarkable along with blood work and urine sample which were unremarkable.  It was felt pain was musculoskeletal.  He was given a muscle relaxer and nausea medicine.  He states the pain radiates into the left hip area.  He has had no bowel or bladder dysfunction.  He denies any injury to the area.    History provided by:  Patient      Review of Systems   Constitutional:  Negative for fever.   HENT:  Negative for congestion.    Respiratory:  Negative for cough and shortness of breath.    Cardiovascular:  Negative for chest pain.   Gastrointestinal:  Negative for abdominal pain, diarrhea and vomiting.   Genitourinary:  Positive for flank pain. Negative for difficulty urinating and dysuria.   Musculoskeletal:  Positive for back pain.   Neurological:  Negative for weakness, numbness and headaches.   Psychiatric/Behavioral:  Negative for confusion.        Past Medical History:   Diagnosis Date    Diabetes mellitus type I, controlled     Dyslipidemia     Dyslipidemia 06/21/2019    Erectile dysfunction     Impression: viagra causes headache. Try cilais    Fatigue     Impression: likely multifactorial,Findings discussed. All questions answered. Differential diagnosis discussed. Follow-up after testing complete, sooner for worsening symptoms or any concerns. Recommend sleep study. Wife says pt snores significantly.    GERD (gastroesophageal reflux disease)     History of chicken pox     History of hepatitis C     Impression: s/p tx with harvoni    History of tobacco use     Hypertension     Medicare annual wellness visit, subsequent     with abnormal findings    Neck pain     Impression: left sided.    Nocturia     ABY  "(obstructive sleep apnea)     Impression: per sleep study. Pt recently bought new mattress, wants to see how he does with that.    Overweight (BMI 25.0-29.9)     Screening for alcoholism     Screening for depression     Vitamin D deficiency        Allergies   Allergen Reactions    Baclofen Hallucinations    Pramipexole Hallucinations    Pramipexole Dihydrochloride Hallucinations    Sildenafil Headache and Hives     Other reaction(s): Blurring of visual image    Vardenafil Headache, Hives and Photosensitivity       Past Surgical History:   Procedure Laterality Date    CATARACT EXTRACTION, BILATERAL      ENDOSCOPY      ENDOSCOPY N/A 2023    Procedure: ESOPHAGOGASTRODUODENOSCOPY WITH BIOPSY X2 AREAS  AND DILATATION (BOUGIE #54,#58);  Surgeon: Margarito Dorado MD;  Location: [facility] ENDOSCOPY;  Service: Gastroenterology;  Laterality: N/A;  POST: GASTRIC POLYPS AND DYSPHAGIA       Family History   Problem Relation Age of Onset    Diabetes Mother         Diabetes Mellitus     Coronary artery disease Mother     Hypertension Mother     Heart attack Mother     Coronary artery disease Father     Hypertension Father     Emphysema Father     Pancreatic cancer Sister     Esophageal cancer Brother        Social History     Socioeconomic History    Marital status:    Tobacco Use    Smoking status: Former     Current packs/day: 0.00     Average packs/day: 1 pack/day for 40.0 years (40.0 ttl pk-yrs)     Types: Cigarettes     Start date: 1971     Quit date: 2011     Years since quittin.7     Passive exposure: Past    Smokeless tobacco: Never   Vaping Use    Vaping status: Never Used   Substance and Sexual Activity    Alcohol use: No    Drug use: No    Sexual activity: Defer       /63   Pulse 74   Temp 98.3 °F (36.8 °C)   Resp 16   Ht 160 cm (63\")   Wt 67.1 kg (147 lb 14.9 oz)   SpO2 96%   BMI 26.20 kg/m²       Objective   Physical Exam  Vitals and nursing note reviewed.   Constitutional:       " Appearance: Normal appearance.   HENT:      Head: Normocephalic and atraumatic.      Mouth/Throat:      Mouth: Mucous membranes are moist.   Cardiovascular:      Rate and Rhythm: Normal rate and regular rhythm.   Pulmonary:      Effort: Pulmonary effort is normal. No respiratory distress.   Abdominal:      General: Bowel sounds are normal.      Palpations: Abdomen is soft.      Tenderness: There is no abdominal tenderness.   Musculoskeletal:      Comments: There is tenderness to the left paraspinal musculature in the lower lumbar spine.  There is no visible injury or deformity.  No midline tenderness or step-off.   Skin:     General: Skin is warm and dry.   Neurological:      General: No focal deficit present.      Mental Status: He is alert and oriented to person, place, and time.         Procedures           ED Course                                               Medical Decision Making    Patient was given Toradol, Solu-Medrol, Norco.  He is feeling much better.  No evidence of cauda equina syndrome or infectious process.  Patient will be discharged with prescriptions for prednisone and a small prescription for Norco.  He is to follow-up with his primary care for possible outpatient MRI if symptoms continue.      Final diagnoses:   Acute left-sided low back pain, unspecified whether sciatica present       ED Disposition  ED Disposition       ED Disposition   Discharge    Condition   Stable    Comment   --               Lopez Ortega, APRN  [address] [facility] [place] [id number] [phone]    Call in 2 days           Medication List        New Prescriptions      HYDROcodone-acetaminophen 5-325 MG per tablet  Commonly known as: NORCO  Take 1 tablet by mouth Every 6 (Six) Hours As Needed for Mild Pain or Moderate Pain.     predniSONE 20 MG tablet  Commonly known as: DELTASONE  Take 1 tablet by mouth Daily for 5 days.               Where to Get Your Medications        These medications were sent to [id number] - [place], [place] - [address] AT [address] OF  &  - [phone] PH - [phone] FX  [address], [place] [address]      Phone: [phone]   HYDROcodone-acetaminophen 5-325 MG per tablet  predniSONE 20 MG tablet            Tomer Garcia MD  11/06/24 7561

## 2024-11-06 NOTE — ED TRIAGE NOTES
Pt arrived via [facility] c/o left flank pain and back pain. PT reports the back pain has been going on for years but the flank pain started 2-3 months ago

## 2024-11-06 NOTE — DISCHARGE INSTRUCTIONS
Follow-up with your primary care.  Return to the emergency room for any new or worsening symptoms or if you have any other questions or concerns.  Take medications as prescribed.

## 2024-11-20 ENCOUNTER — TRANSCRIBE ORDERS (OUTPATIENT)
Dept: ADMINISTRATIVE | Facility: HOSPITAL | Age: 74
End: 2024-11-20
Payer: MEDICARE

## 2024-11-20 DIAGNOSIS — M54.16 LUMBAR RADICULOPATHY: Primary | ICD-10-CM

## 2025-03-10 ENCOUNTER — APPOINTMENT (OUTPATIENT)
Dept: CT IMAGING | Facility: HOSPITAL | Age: 75
End: 2025-03-10
Payer: MEDICARE

## 2025-03-10 ENCOUNTER — HOSPITAL ENCOUNTER (EMERGENCY)
Facility: HOSPITAL | Age: 75
Discharge: HOME | End: 2025-03-10
Admitting: EMERGENCY MEDICINE
Payer: MEDICARE

## 2025-03-10 VITALS
RESPIRATION RATE: 16 BRPM | SYSTOLIC BLOOD PRESSURE: 166 MMHG | HEART RATE: 60 BPM | TEMPERATURE: 97.7 F | DIASTOLIC BLOOD PRESSURE: 52 MMHG | OXYGEN SATURATION: 97 % | BODY MASS INDEX: 25.22 KG/M2 | HEIGHT: 64 IN | WEIGHT: 147.71 LBS

## 2025-03-10 DIAGNOSIS — M25.552 LEFT HIP PAIN: ICD-10-CM

## 2025-03-10 DIAGNOSIS — M51.362 DEGENERATION OF INTERVERTEBRAL DISC OF LUMBAR REGION WITH DISCOGENIC BACK PAIN AND LOWER EXTREMITY PAIN: Primary | ICD-10-CM

## 2025-03-10 DIAGNOSIS — M41.9 SCOLIOSIS OF LUMBAR SPINE, UNSPECIFIED SCOLIOSIS TYPE: ICD-10-CM

## 2025-03-10 LAB
ALBUMIN SERPL-MCNC: 4 G/DL (ref 3.5–5.2)
ALBUMIN/GLOB SERPL: 1.7 G/DL
ALP SERPL-CCNC: 76 U/L (ref 39–117)
ALT SERPL W P-5'-P-CCNC: 12 U/L (ref 1–41)
ANION GAP SERPL CALCULATED.3IONS-SCNC: 9.1 MMOL/L (ref 5–15)
AST SERPL-CCNC: 27 U/L (ref 1–40)
ATMOSPHERIC PRESS: ABNORMAL MM[HG]
BASE EXCESS BLDV CALC-SCNC: 3.1 MMOL/L (ref -2–2)
BASOPHILS # BLD AUTO: 0.06 10*3/MM3 (ref 0–0.2)
BASOPHILS NFR BLD AUTO: 0.8 % (ref 0–1.5)
BDY SITE: ABNORMAL
BILIRUB SERPL-MCNC: 1.1 MG/DL (ref 0–1.2)
BILIRUB UR QL STRIP: NEGATIVE
BUN SERPL-MCNC: 20 MG/DL (ref 8–23)
BUN/CREAT SERPL: 18.5 (ref 7–25)
CALCIUM SPEC-SCNC: 8.9 MG/DL (ref 8.6–10.5)
CHLORIDE SERPL-SCNC: 97 MMOL/L (ref 98–107)
CLARITY UR: CLEAR
CO2 BLDA-SCNC: 30.9 MMOL/L (ref 22–29)
CO2 SERPL-SCNC: 25.9 MMOL/L (ref 22–29)
COLOR UR: YELLOW
CREAT SERPL-MCNC: 1.08 MG/DL (ref 0.76–1.27)
DEPRECATED RDW RBC AUTO: 42.5 FL (ref 37–54)
EGFRCR SERPLBLD CKD-EPI 2021: 71.6 ML/MIN/1.73
EOSINOPHIL # BLD AUTO: 0.25 10*3/MM3 (ref 0–0.4)
EOSINOPHIL NFR BLD AUTO: 3.5 % (ref 0.3–6.2)
ERYTHROCYTE [DISTWIDTH] IN BLOOD BY AUTOMATED COUNT: 12.3 % (ref 12.3–15.4)
GLOBULIN UR ELPH-MCNC: 2.3 GM/DL
GLUCOSE SERPL-MCNC: 458 MG/DL (ref 65–99)
GLUCOSE UR STRIP-MCNC: ABNORMAL MG/DL
HCO3 BLDV-SCNC: 29.3 MMOL/L (ref 22–26)
HCT VFR BLD AUTO: 39.8 % (ref 37.5–51)
HGB BLD-MCNC: 12.6 G/DL (ref 13–17.7)
HGB UR QL STRIP.AUTO: NEGATIVE
HOLD SPECIMEN: NORMAL
IMM GRANULOCYTES # BLD AUTO: 0.01 10*3/MM3 (ref 0–0.05)
IMM GRANULOCYTES NFR BLD AUTO: 0.1 % (ref 0–0.5)
INHALED O2 CONCENTRATION: 21 %
KETONES UR QL STRIP: NEGATIVE
LEUKOCYTE ESTERASE UR QL STRIP.AUTO: NEGATIVE
LYMPHOCYTES # BLD AUTO: 2.13 10*3/MM3 (ref 0.7–3.1)
LYMPHOCYTES NFR BLD AUTO: 29.7 % (ref 19.6–45.3)
MCH RBC QN AUTO: 29.5 PG (ref 26.6–33)
MCHC RBC AUTO-ENTMCNC: 31.7 G/DL (ref 31.5–35.7)
MCV RBC AUTO: 93.2 FL (ref 79–97)
MODALITY: ABNORMAL
MONOCYTES # BLD AUTO: 0.41 10*3/MM3 (ref 0.1–0.9)
MONOCYTES NFR BLD AUTO: 5.7 % (ref 5–12)
NEUTROPHILS NFR BLD AUTO: 4.3 10*3/MM3 (ref 1.7–7)
NEUTROPHILS NFR BLD AUTO: 60.2 % (ref 42.7–76)
NITRITE UR QL STRIP: NEGATIVE
NRBC BLD AUTO-RTO: 0 /100 WBC (ref 0–0.2)
PCO2 BLDV: 50.7 MM HG (ref 42–51)
PH BLDV: 7.37 PH UNITS (ref 7.32–7.43)
PH UR STRIP.AUTO: 5.5 [PH] (ref 5–8)
PLATELET # BLD AUTO: 169 10*3/MM3 (ref 140–450)
PMV BLD AUTO: 11.7 FL (ref 6–12)
PO2 BLDV: 30.3 MM HG (ref 40–42)
POTASSIUM SERPL-SCNC: 4.7 MMOL/L (ref 3.5–5.2)
PROT SERPL-MCNC: 6.3 G/DL (ref 6–8.5)
PROT UR QL STRIP: NEGATIVE
RBC # BLD AUTO: 4.27 10*6/MM3 (ref 4.14–5.8)
SAO2 % BLDCOV: 55.1 % (ref 45–75)
SODIUM SERPL-SCNC: 132 MMOL/L (ref 136–145)
SP GR UR STRIP: 1.03 (ref 1–1.03)
UROBILINOGEN UR QL STRIP: ABNORMAL
WBC NRBC COR # BLD AUTO: 7.16 10*3/MM3 (ref 3.4–10.8)
WHOLE BLOOD HOLD COAG: NORMAL

## 2025-03-10 PROCEDURE — 74177 CT ABD & PELVIS W/CONTRAST: CPT

## 2025-03-10 PROCEDURE — 85025 COMPLETE CBC W/AUTO DIFF WBC: CPT

## 2025-03-10 PROCEDURE — 81003 URINALYSIS AUTO W/O SCOPE: CPT

## 2025-03-10 PROCEDURE — 80053 COMPREHEN METABOLIC PANEL: CPT

## 2025-03-10 PROCEDURE — 82803 BLOOD GASES ANY COMBINATION: CPT

## 2025-03-10 PROCEDURE — 99285 EMERGENCY DEPT VISIT HI MDM: CPT

## 2025-03-10 PROCEDURE — 25510000001 IOPAMIDOL PER 1 ML

## 2025-03-10 RX ORDER — DICLOFENAC SODIUM 30 MG/G
GEL TOPICAL 2 TIMES DAILY
Qty: 100 G | Refills: 0 | Status: SHIPPED | OUTPATIENT
Start: 2025-03-10 | End: 2025-03-15

## 2025-03-10 RX ORDER — IOPAMIDOL 755 MG/ML
100 INJECTION, SOLUTION INTRAVASCULAR
Status: COMPLETED | OUTPATIENT
Start: 2025-03-10 | End: 2025-03-10

## 2025-03-10 RX ORDER — SODIUM CHLORIDE 0.9 % (FLUSH) 0.9 %
10 SYRINGE (ML) INJECTION AS NEEDED
Status: DISCONTINUED | OUTPATIENT
Start: 2025-03-10 | End: 2025-03-10 | Stop reason: HOSPADM

## 2025-03-10 RX ADMIN — IOPAMIDOL 100 ML: 755 INJECTION, SOLUTION INTRAVENOUS at 15:19

## 2025-03-10 NOTE — ED PROVIDER NOTES
Subjective   History of Present Illness  Patient is a 74-year old male with a history of DM, HTN and gerd presenting to the ER from home with complaints of L abdominal pain that radiates to his sacrum and hip. It has been ongoing intermittently over the past 2-3 years but worsening over the past 2-3 weeks without known injury or trauma. He states the pain is worse if actively standing or sitting. He has seen a spinal specialist with unremarkable spinal studies. [place] [facility] [place] for [place]. He denies urinary or stool incontinence and has not noted any senseory changes. He denies trouble ambulating or unilateral weakness. No reported urinary or bowel issues at this time.     PCP: Jordan ([facility])      Review of Systems    Past Medical History:   Diagnosis Date    Diabetes mellitus type I, controlled     Dyslipidemia     Dyslipidemia 06/21/2019    Erectile dysfunction     Impression: viagra causes headache. Try cilais    Fatigue     Impression: likely multifactorial,Findings discussed. All questions answered. Differential diagnosis discussed. Follow-up after testing complete, sooner for worsening symptoms or any concerns. Recommend sleep study. Wife says pt snores significantly.    GERD (gastroesophageal reflux disease)     History of chicken pox     History of hepatitis C     Impression: s/p tx with harvoni    History of tobacco use     Hypertension     Medicare annual wellness visit, subsequent     with abnormal findings    Neck pain     Impression: left sided.    Nocturia     ABY (obstructive sleep apnea)     Impression: per sleep study. Pt recently bought new mattress, wants to see how he does with that.    Overweight (BMI 25.0-29.9)     Screening for alcoholism     Screening for depression     Vitamin D deficiency        Allergies   Allergen Reactions    Baclofen Hallucinations    Pramipexole Hallucinations    Pramipexole Dihydrochloride Hallucinations    Sildenafil  Headache and Hives     Other reaction(s): Blurring of visual image    Vardenafil Headache, Hives and Photosensitivity       Past Surgical History:   Procedure Laterality Date    CATARACT EXTRACTION, BILATERAL      ENDOSCOPY      ENDOSCOPY N/A 2023    Procedure: ESOPHAGOGASTRODUODENOSCOPY WITH BIOPSY X2 AREAS  AND DILATATION (BOUGIE #54,#58);  Surgeon: Margarito Dorado MD;  Location: [facility] ENDOSCOPY;  Service: Gastroenterology;  Laterality: N/A;  POST: GASTRIC POLYPS AND DYSPHAGIA       Family History   Problem Relation Age of Onset    Diabetes Mother         Diabetes Mellitus     Coronary artery disease Mother     Hypertension Mother     Heart attack Mother     Coronary artery disease Father     Hypertension Father     Emphysema Father     Pancreatic cancer Sister     Esophageal cancer Brother        Social History     Socioeconomic History    Marital status:    Tobacco Use    Smoking status: Former     Current packs/day: 0.00     Average packs/day: 1 pack/day for 40.0 years (40.0 ttl pk-yrs)     Types: Cigarettes     Start date: 1971     Quit date: 2011     Years since quittin.1     Passive exposure: Past    Smokeless tobacco: Never   Vaping Use    Vaping status: Never Used   Substance and Sexual Activity    Alcohol use: No    Drug use: No    Sexual activity: Defer           Objective   Physical Exam    Procedures           ED Course  ED Course as of 03/10/25 1644   Mon Mar 10, 2025   1440 Lab notified this provider of elevated glucose over 400 - pt is wearing CGM and insulin pump at this time.  [SJ]   4403 Spoke to patient and family regarding high glucose - pt has already replaced his pump and treated hyperglycemia. He could not recall amount of insulin received but stated his CGM read glucose of 257.  [SJ]   1639 Pt showed me current CGM reading of 139 after giving insulin. [SJ]      ED Course User Index  [SJ] Gisella Grayson, APRN      /52 (BP Location: Left arm, Patient  "Position: Sitting)   Pulse 60   Temp 97.7 °F (36.5 °C) (Oral)   Resp 16   Ht 162.6 cm (64\")   Wt 67 kg (147 lb 11.3 oz)   SpO2 97%   BMI 25.35 kg/m²   Labs Reviewed   COMPREHENSIVE METABOLIC PANEL - Abnormal; Notable for the following components:       Result Value    Glucose 458 (*)     Sodium 132 (*)     Chloride 97 (*)     All other components within normal limits    Narrative:     GFR Categories in Chronic Kidney Disease (CKD)      GFR Category          GFR (mL/min/1.73)    Interpretation  G1                     90 or greater         Normal or high (1)  G2                      60-89                Mild decrease (1)  G3a                   45-59                Mild to moderate decrease  G3b                   30-44                Moderate to severe decrease  G4                    15-29                Severe decrease  G5                    14 or less           Kidney failure          (1)In the absence of evidence of kidney disease, neither GFR category G1 or G2 fulfill the criteria for CKD.    eGFR calculation 2021 CKD-EPI creatinine equation, which does not include race as a factor   URINALYSIS W/ MICROSCOPIC IF INDICATED (NO CULTURE) - Abnormal; Notable for the following components:    Glucose, UA >=1000 mg/dL (3+) (*)     All other components within normal limits    Narrative:     Urine microscopic not indicated.   CBC WITH AUTO DIFFERENTIAL - Abnormal; Notable for the following components:    Hemoglobin 12.6 (*)     All other components within normal limits   BLOOD GAS, VENOUS - Abnormal; Notable for the following components:    pO2, Venous 30.3 (*)     HCO3, Venous 29.3 (*)     Base Excess, Venous 3.1 (*)     CO2 Content 30.9 (*)     All other components within normal limits   CBC AND DIFFERENTIAL    Narrative:     The following orders were created for panel order CBC & Differential.  Procedure                               Abnormality         Status                     ---------                          "      -----------         ------                     CBC Auto Differential[[id number]]        Abnormal            Final result                 Please view results for these tests on the individual orders.   EXTRA TUBES    Narrative:     The following orders were created for panel order Extra Tubes.  Procedure                               Abnormality         Status                     ---------                               -----------         ------                     Gold Top - SST[[id number]]                                   Final result               Light Blue Top[[id number]]                                   Final result                 Please view results for these tests on the individual orders.   GOLD TOP - SST   LIGHT BLUE TOP     Medications   sodium chloride 0.9 % flush 10 mL (has no administration in time range)   iopamidol (ISOVUE-370) 76 % injection 100 mL (100 mL Intravenous Given 3/10/25 1519)     CT Abdomen Pelvis With Contrast  Result Date: 3/10/2025  Impression: 1.CT evaluation of the hip and sacrum is normal. 2.Scoliosis and marked degenerative changes of the lumbar spine as described above. This includes degenerative disc disease, facet hypertrophy, endplate sclerosis and posterior disc bulges, raising concern for possible radicular pain. If indicated lumbar  spine MRI could be performed not emergently. Electronically Signed: Facundo Dotson MD  3/10/2025 3:46 PM EDT  Workstation ID: [id number]                                                     Medical Decision Making  Problems Addressed:  Degeneration of intervertebral disc of lumbar region with discogenic back pain and lower extremity pain: complicated acute illness or injury  Left hip pain: complicated acute illness or injury  Scoliosis of lumbar spine, unspecified scoliosis type: complicated acute illness or injury    Amount and/or Complexity of Data Reviewed  Labs: ordered.  Radiology: ordered.    Risk  Prescription drug  management.    Patient is a pleasant 74-year-old  male with a history of diabetes who presents to the emergency room with complaints of pain in his left hip that he believes is originating in his left abdomen but radiates to his sacrum.  On exam, patient is ambulatory upright and steadily without assistance but complains of pain when standing up or sitting down.  Negative straight leg raise.  He is able to move all extremities with full active range of motion.  Distal pulses are strong and equal bilaterally and he has no unilateral weakness noted.  Normal S1/S2 without clicks murmurs.  No JVD.  Lungs clear to auscultation in all fields.  Initial differentials include sciatica, constipation, tumor, compression fracture.  This is not a complete list.    Due to overwhelming hospital census and boarding inpatients in the emergency room, patient received above examination in hallway bed.  Examination was made to the best of provider's ability with respect to patient privacy and confidentiality.  Patient was offered pain medication but has declined at this time.  CBC is without leukocytosis or significant anemia.  CMP reveals a glucose of 458.  At time of notification, patient had already started treating his hyperglycemia with his insulin pump using his continuous glucose monitor.  Venous blood gas was collected to rule out DKA and found to be unremarkable for acidosis.  His urine is negative for UTI findings.  My interpretation of CT reveals no evidence of ureteral stone, bowel obstruction or free air in the abdomen.  This did concur with radiologist, who notes a normal evaluation of the hip and sacrum.  Scoliosis in the lumbar region is noted with marked degenerative changes of the lumbar spine including degenerative disc disease, facet hypertrophy, endplate sclerosis and posterior disc bulges.  Radicular pain is concerned and nonemergent MRI spine is recommended.  Upon reassessment, this was discussed with  patient.  At this time, patient does not wish to take oral NSAIDs as he has been advised not to.  He is agreeable to trying topical NSAID such as Voltaren gel and will follow to a spinal specialist.  He is in no acute distress and repeat glucose on his CGM was found to be 139.  No acute distress noted.    I discussed the findings with patient who voices understanding of discharge instructions, signs and symptoms requiring return to the ED; discharged improved and stable condition with follow-up for reevaluation.    Patient is aware that discharge does not mean that nothing is wrong but it indicates no emergency is present and they must continue care with follow-up as given below or physician of their choice.    This document is intended for medical expert use only.  Reading of this document by patients and/or patient's family without participating medical staff guidance may result in misinterpretation and unintended morbidity.  Any interpretation of such data is the responsibility of the patient and/or family member responsible for the patient in concert with their primary or specialist providers, not to be left for sources of online search as such as iMedicare, [employer] or similar queries.  Relying on these approaches to knowledge may result in misinterpretation, misguided goals of care and even death should patient or family members try recommendations outside of the realm of professional medical care in a supervised inpatient environment.    This medical document was created using Dragon dictation system. Some errors in speech recognition may occur.    Final diagnoses:   Left hip pain   Scoliosis of lumbar spine, unspecified scoliosis type   Degeneration of intervertebral disc of lumbar region with discogenic back pain and lower extremity pain       ED Disposition  ED Disposition       ED Disposition   Discharge    Condition   Stable    Comment   --               Lopez Ortega, APRN  [address] [facility]  [place] [address] [phone]          Facundo Hernandez MD  [address] [address]  [phone]               Medication List        New Prescriptions      Diclofenac Sodium 3 % gel gel  Apply  topically to the appropriate area as directed 2 (Two) Times a Day for 5 days. for 60 days.               Where to Get Your Medications        These medications were sent to [id number] - [place], [place] - [address] [place] [address] AT [facility] OF  &  - [phone]  - [phone] [address], [place] [address]      Phone: [phone]   Diclofenac Sodium 3 % gel gel            Gisella Grayson, APRN  03/10/25 0903

## 2025-03-10 NOTE — DISCHARGE INSTRUCTIONS
Use voltaren gel on lower back up to 3 times a day as needed for pain. Rest. Alternate ice and heat for 20 minutes at a time several times a day. Avoid heavy lifting or straining.    Follow-up with spinal specialist for further evaluation of low back pain.  Follow-up with primary provider as needed.    Return to the ER for new or worsening symptoms.

## 2025-03-13 ENCOUNTER — HOSPITAL ENCOUNTER (EMERGENCY)
Facility: HOSPITAL | Age: 75
Discharge: HOME | End: 2025-03-13
Attending: EMERGENCY MEDICINE
Payer: MEDICARE

## 2025-03-13 VITALS
WEIGHT: 149.69 LBS | BODY MASS INDEX: 25.56 KG/M2 | DIASTOLIC BLOOD PRESSURE: 70 MMHG | TEMPERATURE: 97.7 F | SYSTOLIC BLOOD PRESSURE: 163 MMHG | HEIGHT: 64 IN | OXYGEN SATURATION: 99 % | HEART RATE: 66 BPM | RESPIRATION RATE: 20 BRPM

## 2025-03-13 DIAGNOSIS — R13.10 DYSPHAGIA, UNSPECIFIED TYPE: Primary | ICD-10-CM

## 2025-03-13 LAB
ALBUMIN SERPL-MCNC: 4.3 G/DL (ref 3.5–5.2)
ALBUMIN/GLOB SERPL: 1.9 G/DL
ALP SERPL-CCNC: 69 U/L (ref 39–117)
ALT SERPL W P-5'-P-CCNC: 9 U/L (ref 1–41)
ANION GAP SERPL CALCULATED.3IONS-SCNC: 7.3 MMOL/L (ref 5–15)
AST SERPL-CCNC: 32 U/L (ref 1–40)
BASOPHILS # BLD AUTO: 0.07 10*3/MM3 (ref 0–0.2)
BASOPHILS NFR BLD AUTO: 1.1 % (ref 0–1.5)
BILIRUB SERPL-MCNC: 1.2 MG/DL (ref 0–1.2)
BUN SERPL-MCNC: 25 MG/DL (ref 8–23)
BUN/CREAT SERPL: 25.8 (ref 7–25)
CALCIUM SPEC-SCNC: 9.4 MG/DL (ref 8.6–10.5)
CHLORIDE SERPL-SCNC: 102 MMOL/L (ref 98–107)
CO2 SERPL-SCNC: 27.7 MMOL/L (ref 22–29)
CREAT SERPL-MCNC: 0.97 MG/DL (ref 0.76–1.27)
DEPRECATED RDW RBC AUTO: 42.5 FL (ref 37–54)
EGFRCR SERPLBLD CKD-EPI 2021: 81.4 ML/MIN/1.73
EOSINOPHIL # BLD AUTO: 0.2 10*3/MM3 (ref 0–0.4)
EOSINOPHIL NFR BLD AUTO: 3.1 % (ref 0.3–6.2)
ERYTHROCYTE [DISTWIDTH] IN BLOOD BY AUTOMATED COUNT: 12.2 % (ref 12.3–15.4)
GLOBULIN UR ELPH-MCNC: 2.3 GM/DL
GLUCOSE SERPL-MCNC: 184 MG/DL (ref 65–99)
HCT VFR BLD AUTO: 40.3 % (ref 37.5–51)
HGB BLD-MCNC: 12.7 G/DL (ref 13–17.7)
HOLD SPECIMEN: NORMAL
IMM GRANULOCYTES # BLD AUTO: 0.01 10*3/MM3 (ref 0–0.05)
IMM GRANULOCYTES NFR BLD AUTO: 0.2 % (ref 0–0.5)
LIPASE SERPL-CCNC: 16 U/L (ref 13–60)
LYMPHOCYTES # BLD AUTO: 2.14 10*3/MM3 (ref 0.7–3.1)
LYMPHOCYTES NFR BLD AUTO: 32.9 % (ref 19.6–45.3)
MCH RBC QN AUTO: 29.5 PG (ref 26.6–33)
MCHC RBC AUTO-ENTMCNC: 31.5 G/DL (ref 31.5–35.7)
MCV RBC AUTO: 93.5 FL (ref 79–97)
MONOCYTES # BLD AUTO: 0.38 10*3/MM3 (ref 0.1–0.9)
MONOCYTES NFR BLD AUTO: 5.8 % (ref 5–12)
NEUTROPHILS NFR BLD AUTO: 3.7 10*3/MM3 (ref 1.7–7)
NEUTROPHILS NFR BLD AUTO: 56.9 % (ref 42.7–76)
NRBC BLD AUTO-RTO: 0 /100 WBC (ref 0–0.2)
PLATELET # BLD AUTO: 174 10*3/MM3 (ref 140–450)
PMV BLD AUTO: 11.1 FL (ref 6–12)
POTASSIUM SERPL-SCNC: 4.3 MMOL/L (ref 3.5–5.2)
PROT SERPL-MCNC: 6.6 G/DL (ref 6–8.5)
RBC # BLD AUTO: 4.31 10*6/MM3 (ref 4.14–5.8)
S PYO AG THROAT QL: NEGATIVE
SODIUM SERPL-SCNC: 137 MMOL/L (ref 136–145)
WBC NRBC COR # BLD AUTO: 6.5 10*3/MM3 (ref 3.4–10.8)
WHOLE BLOOD HOLD COAG: NORMAL

## 2025-03-13 PROCEDURE — 99283 EMERGENCY DEPT VISIT LOW MDM: CPT

## 2025-03-13 PROCEDURE — 87651 STREP A DNA AMP PROBE: CPT | Performed by: EMERGENCY MEDICINE

## 2025-03-13 PROCEDURE — 83690 ASSAY OF LIPASE: CPT | Performed by: EMERGENCY MEDICINE

## 2025-03-13 PROCEDURE — 85025 COMPLETE CBC W/AUTO DIFF WBC: CPT | Performed by: EMERGENCY MEDICINE

## 2025-03-13 PROCEDURE — 80053 COMPREHEN METABOLIC PANEL: CPT | Performed by: EMERGENCY MEDICINE

## 2025-03-13 RX ORDER — SODIUM CHLORIDE 0.9 % (FLUSH) 0.9 %
10 SYRINGE (ML) INJECTION AS NEEDED
Status: DISCONTINUED | OUTPATIENT
Start: 2025-03-13 | End: 2025-03-13 | Stop reason: HOSPADM

## 2025-03-13 NOTE — DISCHARGE INSTRUCTIONS
Follow-up with your gastroenterologist.  Return to the emergency room for any new or worsening symptoms or if you have any other questions or concerns.

## 2025-03-13 NOTE — ED PROVIDER NOTES
Subjective   History of Present Illness  Chief complaint: Spit up blood    74-year-old male presents stating he spit up some blood today.  He states he started having some pain in his throat when he swallowed a pill yesterday.  He felt like it irritated his throat.  He was also concerned that the pill got stuck.  Today he said he was trying to see if he could get the pill up and he ended up spitting out a small amount of blood.  He denies any real vomiting.  He has had no diarrhea.  He denies any abdominal pain.  He states he is able to swallow okay but his throat is still a little sore.    History provided by:  Patient      Review of Systems   Constitutional:  Negative for fever.   HENT:  Positive for sore throat. Negative for congestion.    Respiratory:  Negative for cough and shortness of breath.    Cardiovascular:  Negative for chest pain.   Gastrointestinal:  Negative for abdominal pain, diarrhea and vomiting.   Neurological:  Negative for headaches.       Past Medical History:   Diagnosis Date    Diabetes mellitus type I, controlled     Dyslipidemia     Dyslipidemia 06/21/2019    Erectile dysfunction     Impression: viagra causes headache. Try cilais    Fatigue     Impression: likely multifactorial,Findings discussed. All questions answered. Differential diagnosis discussed. Follow-up after testing complete, sooner for worsening symptoms or any concerns. Recommend sleep study. Wife says pt snores significantly.    GERD (gastroesophageal reflux disease)     History of chicken pox     History of hepatitis C     Impression: s/p tx with harvoni    History of tobacco use     Hypertension     Medicare annual wellness visit, subsequent     with abnormal findings    Neck pain     Impression: left sided.    Nocturia     ABY (obstructive sleep apnea)     Impression: per sleep study. Pt recently bought new mattress, wants to see how he does with that.    Overweight (BMI 25.0-29.9)     Screening for alcoholism      "Screening for depression     Vitamin D deficiency        Allergies   Allergen Reactions    Baclofen Hallucinations    Pramipexole Hallucinations    Pramipexole Dihydrochloride Hallucinations    Sildenafil Headache and Hives     Other reaction(s): Blurring of visual image    Vardenafil Headache, Hives and Photosensitivity       Past Surgical History:   Procedure Laterality Date    CATARACT EXTRACTION, BILATERAL      ENDOSCOPY      ENDOSCOPY N/A 2023    Procedure: ESOPHAGOGASTRODUODENOSCOPY WITH BIOPSY X2 AREAS  AND DILATATION (BOUGIE #54,#58);  Surgeon: Margarito Dorado MD;  Location: [facility] ENDOSCOPY;  Service: Gastroenterology;  Laterality: N/A;  POST: GASTRIC POLYPS AND DYSPHAGIA       Family History   Problem Relation Age of Onset    Diabetes Mother         Diabetes Mellitus     Coronary artery disease Mother     Hypertension Mother     Heart attack Mother     Coronary artery disease Father     Hypertension Father     Emphysema Father     Pancreatic cancer Sister     Esophageal cancer Brother        Social History     Socioeconomic History    Marital status:    Tobacco Use    Smoking status: Former     Current packs/day: 0.00     Average packs/day: 1 pack/day for 40.0 years (40.0 ttl pk-yrs)     Types: Cigarettes     Start date: 1971     Quit date: 2011     Years since quittin.1     Passive exposure: Past    Smokeless tobacco: Never   Vaping Use    Vaping status: Never Used   Substance and Sexual Activity    Alcohol use: No    Drug use: No    Sexual activity: Defer       /51   Pulse 60   Temp 97.7 °F (36.5 °C) (Oral)   Resp 20   Ht 162.6 cm (64\")   Wt 67.9 kg (149 lb 11.1 oz)   SpO2 99%   BMI 25.69 kg/m²       Objective   Physical Exam  Vitals and nursing note reviewed.   Constitutional:       Appearance: Normal appearance.   HENT:      Head: Normocephalic and atraumatic.      Mouth/Throat:      Mouth: Mucous membranes are moist.      Comments: Very mild erythema in the " posterior oropharynx.  There is no swelling or exudate.  There is no visible blood.  Cardiovascular:      Rate and Rhythm: Normal rate and regular rhythm.   Pulmonary:      Effort: Pulmonary effort is normal. No respiratory distress.   Abdominal:      Palpations: Abdomen is soft.      Tenderness: There is no abdominal tenderness.   Skin:     General: Skin is warm and dry.   Neurological:      Mental Status: He is alert and oriented to person, place, and time.         Procedures           ED Course      Results for orders placed or performed during the hospital encounter of 03/13/25   Rapid Strep A Screen - Swab, Throat    Collection Time: 03/13/25 12:29 PM    Specimen: Throat; Swab   Result Value Ref Range    Strep A Ag Negative Negative   Comprehensive Metabolic Panel    Collection Time: 03/13/25 12:29 PM    Specimen: Blood   Result Value Ref Range    Glucose 184 (H) 65 - 99 mg/dL    BUN 25 (H) 8 - 23 mg/dL    Creatinine 0.97 0.76 - 1.27 mg/dL    Sodium 137 136 - 145 mmol/L    Potassium 4.3 3.5 - 5.2 mmol/L    Chloride 102 98 - 107 mmol/L    CO2 27.7 22.0 - 29.0 mmol/L    Calcium 9.4 8.6 - 10.5 mg/dL    Total Protein 6.6 6.0 - 8.5 g/dL    Albumin 4.3 3.5 - 5.2 g/dL    ALT (SGPT) 9 1 - 41 U/L    AST (SGOT) 32 1 - 40 U/L    Alkaline Phosphatase 69 39 - 117 U/L    Total Bilirubin 1.2 0.0 - 1.2 mg/dL    Globulin 2.3 gm/dL    A/G Ratio 1.9 g/dL    BUN/Creatinine Ratio 25.8 (H) 7.0 - 25.0    Anion Gap 7.3 5.0 - 15.0 mmol/L    eGFR 81.4 >60.0 mL/min/1.73   Lipase    Collection Time: 03/13/25 12:29 PM    Specimen: Blood   Result Value Ref Range    Lipase 16 13 - 60 U/L   CBC Auto Differential    Collection Time: 03/13/25 12:29 PM    Specimen: Blood   Result Value Ref Range    WBC 6.50 3.40 - 10.80 10*3/mm3    RBC 4.31 4.14 - 5.80 10*6/mm3    Hemoglobin 12.7 (L) 13.0 - 17.7 g/dL    Hematocrit 40.3 37.5 - 51.0 %    MCV 93.5 79.0 - 97.0 fL    MCH 29.5 26.6 - 33.0 pg    MCHC 31.5 31.5 - 35.7 g/dL    RDW 12.2 (L) 12.3 - 15.4 %     RDW-SD 42.5 37.0 - 54.0 fl    MPV 11.1 6.0 - 12.0 fL    Platelets 174 140 - 450 10*3/mm3    Neutrophil % 56.9 42.7 - 76.0 %    Lymphocyte % 32.9 19.6 - 45.3 %    Monocyte % 5.8 5.0 - 12.0 %    Eosinophil % 3.1 0.3 - 6.2 %    Basophil % 1.1 0.0 - 1.5 %    Immature Grans % 0.2 0.0 - 0.5 %    Neutrophils, Absolute 3.70 1.70 - 7.00 10*3/mm3    Lymphocytes, Absolute 2.14 0.70 - 3.10 10*3/mm3    Monocytes, Absolute 0.38 0.10 - 0.90 10*3/mm3    Eosinophils, Absolute 0.20 0.00 - 0.40 10*3/mm3    Basophils, Absolute 0.07 0.00 - 0.20 10*3/mm3    Immature Grans, Absolute 0.01 0.00 - 0.05 10*3/mm3    nRBC 0.0 0.0 - 0.2 /100 WBC   Gold Top - SST    Collection Time: 03/13/25 12:29 PM   Result Value Ref Range    Extra Tube Hold for add-ons.    Light Blue Top    Collection Time: 03/13/25 12:29 PM   Result Value Ref Range    Extra Tube Hold for add-ons.                                                       Medical Decision Making  Amount and/or Complexity of Data Reviewed  Labs: ordered.    Risk  Prescription drug management.      Patient had the above evaluation.  Results were discussed with the patient.  White blood cell count is normal.  Hemoglobin is 12.7 which is stable with recent levels.  CMP and lipase are unremarkable.  Strep screen is negative.  Patient has tolerated p.o. in the emergency room with no difficulty.  He will be discharged to follow-up with his gastroenterologist.      Final diagnoses:   Dysphagia, unspecified type       ED Disposition  ED Disposition       ED Disposition   Discharge    Condition   Stable    Comment   --               [facility] [place] [address] [facility] [address] [phone]  Call in 1 day           Medication List      No changes were made to your prescriptions during this visit.            Tomer Garcia MD  03/13/25 4505

## 2025-03-19 ENCOUNTER — ON CAMPUS - OUTPATIENT (OUTPATIENT)
Dept: URBAN - METROPOLITAN AREA HOSPITAL 2 | Facility: HOSPITAL | Age: 75
End: 2025-03-19
Payer: MEDICARE

## 2025-03-19 VITALS
HEART RATE: 68 BPM | DIASTOLIC BLOOD PRESSURE: 70 MMHG | SYSTOLIC BLOOD PRESSURE: 133 MMHG | RESPIRATION RATE: 19 BRPM | SYSTOLIC BLOOD PRESSURE: 167 MMHG | DIASTOLIC BLOOD PRESSURE: 77 MMHG | SYSTOLIC BLOOD PRESSURE: 161 MMHG | RESPIRATION RATE: 18 BRPM | DIASTOLIC BLOOD PRESSURE: 100 MMHG | SYSTOLIC BLOOD PRESSURE: 95 MMHG | WEIGHT: 148 LBS | HEART RATE: 71 BPM | RESPIRATION RATE: 16 BRPM | SYSTOLIC BLOOD PRESSURE: 136 MMHG | DIASTOLIC BLOOD PRESSURE: 73 MMHG | HEIGHT: 64 IN | SYSTOLIC BLOOD PRESSURE: 145 MMHG | DIASTOLIC BLOOD PRESSURE: 55 MMHG | RESPIRATION RATE: 17 BRPM | HEART RATE: 78 BPM | HEART RATE: 79 BPM | OXYGEN SATURATION: 100 % | DIASTOLIC BLOOD PRESSURE: 98 MMHG | SYSTOLIC BLOOD PRESSURE: 153 MMHG | DIASTOLIC BLOOD PRESSURE: 110 MMHG | HEART RATE: 72 BPM | HEART RATE: 75 BPM | TEMPERATURE: 97.2 F | DIASTOLIC BLOOD PRESSURE: 59 MMHG | HEART RATE: 64 BPM | DIASTOLIC BLOOD PRESSURE: 72 MMHG | RESPIRATION RATE: 15 BRPM | SYSTOLIC BLOOD PRESSURE: 96 MMHG | OXYGEN SATURATION: 99 % | SYSTOLIC BLOOD PRESSURE: 130 MMHG | RESPIRATION RATE: 14 BRPM

## 2025-03-19 DIAGNOSIS — K64.1 SECOND DEGREE HEMORRHOIDS: ICD-10-CM

## 2025-03-19 DIAGNOSIS — K57.30 DIVERTICULOSIS OF LARGE INTESTINE WITHOUT PERFORATION OR ABS: ICD-10-CM

## 2025-03-19 DIAGNOSIS — R13.10 DYSPHAGIA, UNSPECIFIED: ICD-10-CM

## 2025-03-19 DIAGNOSIS — Z12.11 ENCOUNTER FOR SCREENING FOR MALIGNANT NEOPLASM OF COLON: ICD-10-CM

## 2025-03-19 DIAGNOSIS — K22.2 ESOPHAGEAL OBSTRUCTION: ICD-10-CM

## 2025-03-19 DIAGNOSIS — K31.7 POLYP OF STOMACH AND DUODENUM: ICD-10-CM

## 2025-03-19 PROCEDURE — 43235 EGD DIAGNOSTIC BRUSH WASH: CPT | Performed by: INTERNAL MEDICINE

## 2025-03-19 PROCEDURE — G0121 COLON CA SCRN NOT HI RSK IND: HCPCS | Performed by: INTERNAL MEDICINE

## 2025-03-19 PROCEDURE — 43450 DILATE ESOPHAGUS 1/MULT PASS: CPT | Performed by: INTERNAL MEDICINE

## 2025-06-17 ENCOUNTER — OFFICE (OUTPATIENT)
Dept: URBAN - METROPOLITAN AREA CLINIC 64 | Facility: CLINIC | Age: 75
End: 2025-06-17
Payer: MEDICARE

## 2025-06-17 VITALS
HEART RATE: 70 BPM | HEIGHT: 64 IN | WEIGHT: 147 LBS | SYSTOLIC BLOOD PRESSURE: 139 MMHG | DIASTOLIC BLOOD PRESSURE: 68 MMHG

## 2025-06-17 DIAGNOSIS — G23.1: ICD-10-CM

## 2025-06-17 DIAGNOSIS — R13.10 DYSPHAGIA, UNSPECIFIED: ICD-10-CM

## 2025-06-17 DIAGNOSIS — K21.9 GASTRO-ESOPHAGEAL REFLUX DISEASE WITHOUT ESOPHAGITIS: ICD-10-CM

## 2025-06-17 PROCEDURE — 99213 OFFICE O/P EST LOW 20 MIN: CPT | Performed by: NURSE PRACTITIONER

## 2025-06-17 RX ORDER — PANTOPRAZOLE SODIUM 40 MG/1
80 TABLET, DELAYED RELEASE ORAL
Qty: 180 | Refills: 3 | Status: ACTIVE
Start: 2025-06-17

## (undated) DEVICE — SINGLE-USE BIOPSY FORCEPS: Brand: RADIAL JAW 4

## (undated) DEVICE — BITEBLOCK ENDO W/STRAP 60F A/ LF DISP

## (undated) DEVICE — PK ENDO GI 50